# Patient Record
Sex: FEMALE | Race: BLACK OR AFRICAN AMERICAN | NOT HISPANIC OR LATINO | Employment: OTHER | URBAN - METROPOLITAN AREA
[De-identification: names, ages, dates, MRNs, and addresses within clinical notes are randomized per-mention and may not be internally consistent; named-entity substitution may affect disease eponyms.]

---

## 2018-01-29 ENCOUNTER — HOSPITAL ENCOUNTER (OUTPATIENT)
Dept: PREADMISSION TESTING | Facility: OTHER | Age: 53
Discharge: HOME | End: 2018-01-29
Attending: ORTHOPAEDIC SURGERY
Payer: MEDICAID

## 2018-01-29 ENCOUNTER — ANESTHESIA EVENT (OUTPATIENT)
Dept: SURGERY | Facility: OTHER | Age: 53
End: 2018-01-29

## 2018-01-29 VITALS
SYSTOLIC BLOOD PRESSURE: 96 MMHG | OXYGEN SATURATION: 99 % | WEIGHT: 173 LBS | HEIGHT: 68 IN | DIASTOLIC BLOOD PRESSURE: 65 MMHG | BODY MASS INDEX: 26.22 KG/M2 | HEART RATE: 81 BPM | TEMPERATURE: 98 F

## 2018-01-29 RX ORDER — LISINOPRIL AND HYDROCHLOROTHIAZIDE 12.5; 2 MG/1; MG/1
1 TABLET ORAL DAILY
COMMUNITY

## 2018-01-29 RX ORDER — FAMOTIDINE 20 MG/1
20 TABLET, FILM COATED ORAL
Status: CANCELLED | OUTPATIENT
Start: 2018-01-29 | End: 2018-01-29

## 2018-01-29 RX ORDER — CALCIUM CARBONATE 600 MG
600 TABLET ORAL ONCE
COMMUNITY

## 2018-01-29 RX ORDER — LIDOCAINE HYDROCHLORIDE 10 MG/ML
0.5 INJECTION, SOLUTION EPIDURAL; INFILTRATION; INTRACAUDAL; PERINEURAL ONCE
Status: CANCELLED | OUTPATIENT
Start: 2018-01-29 | End: 2018-01-29

## 2018-01-29 RX ORDER — ATORVASTATIN CALCIUM 20 MG/1
20 TABLET, FILM COATED ORAL DAILY
COMMUNITY

## 2018-01-29 RX ORDER — POTASSIUM CHLORIDE 750 MG/1
10 CAPSULE, EXTENDED RELEASE ORAL ONCE
COMMUNITY

## 2018-01-29 RX ORDER — METFORMIN HYDROCHLORIDE 500 MG/1
500 TABLET ORAL 2 TIMES DAILY WITH MEALS
COMMUNITY

## 2018-01-29 RX ORDER — AMLODIPINE BESYLATE 10 MG/1
10 TABLET ORAL DAILY
COMMUNITY

## 2018-01-29 RX ORDER — SODIUM CHLORIDE, SODIUM LACTATE, POTASSIUM CHLORIDE, CALCIUM CHLORIDE 600; 310; 30; 20 MG/100ML; MG/100ML; MG/100ML; MG/100ML
INJECTION, SOLUTION INTRAVENOUS CONTINUOUS
Status: CANCELLED | OUTPATIENT
Start: 2018-01-29

## 2018-01-29 RX ORDER — MIDAZOLAM HYDROCHLORIDE 5 MG/ML
4 INJECTION INTRAMUSCULAR; INTRAVENOUS ONCE AS NEEDED
Status: CANCELLED | OUTPATIENT
Start: 2018-01-29 | End: 2018-01-29

## 2018-01-29 RX ORDER — NAPROXEN SODIUM 220 MG/1
81 TABLET, FILM COATED ORAL DAILY
COMMUNITY

## 2018-01-29 NOTE — DISCHARGE INSTRUCTIONS
PRE-ADMIT TESTING -  [phone]    [address]  [facility]          Your surgery has been scheduled at [facility]. We are pleased to have the opportunity to serve you. For Further Information please call [phone].    On the day of surgery please report to the Information Desk on the 1st floor.    · CONTACT YOUR PHYSICIAN'S OFFICE THE DAY PRIOR TO YOUR SURGERY TO OBTAIN YOUR ARRIVAL TIME.     · The evening before surgery do not eat anything after 9 p.m. ( this includes hard candy, chewing gum and mints).  You may only have GATORADE, POWERADE AND WATER  from 9 p.m. until you leave your home.   DO NOT DRINK ANY LIQUIDS ON THE WAY TO THE HOSPITAL.      SPECIAL MEDICATION INSTRUCTIONS: TAKE medications checked off by the Anesthesiologist on your Medication List.    Angiogram Patients: Take medications as instructed by your physician, including aspirin.     Surgery Patients:    If you take ASPIRIN - Your PHYSICIAN/SURGEON will need to inform you IF/OR when you need to stop taking aspirin prior to your surgery.     Do Not take any medications containing IBUPROFEN.  Do Not Wear any make-up or dark nail polish   (especially eye make-up) to surgery. If you come to surgery with makeup on you will be required to remove the makeup or nail polish.    Do not shave your surgical area at least 5 days prior to your surgery. The surgical prep will be performed at the hospital according to Infection Control regulations.    Leave all valuables at home.   Do Not wear any jewelry or watches, including any metal in body piercings.  Contact Lens must be removed before surgery. Either do not wear the contact lens or bring a case and solution for storage.  Please bring a container for eyeglasses or dentures as required.  Bring any paperwork your physician has provided, such as consent forms,  history and physicals, doctor's orders, etc.   Bring comfortable clothes that are loose fitting to wear upon  discharge. Take into consideration the type of surgery being performed.  Maintain your diet as advised per your physician the day prior to surgery.      Adequate rest the night before surgery is advised.   Park in the Parking lot behind the hospital or in the [facility] across the street from the parking lot. Parking is complimentary.  If you will be discharged the same day as your procedure, please arrange for a responsible adult to drive you home or to accompany you if traveling by taxi.   YOU WILL NOT BE PERMITTED TO DRIVE OR TO LEAVE THE HOSPITAL ALONE AFTER SURGERY.   It is strongly recommended that you arrange for someone to remain with you for the first 24 hrs following your surgery.       Thank you for your cooperation.  The Staff of [facility].        Bathing Instructions                                                                 Please shower the evening before and morning of your procedure with    ANTIBACTERIAL SOAP. ( DIAL, etc )  Concentrate on the surgical area   for at least 3 minutes and rinse completely. Dry off as usual.   Do not use any deodorant, powder, body lotions, perfume, after shave or    cologne.

## 2018-01-29 NOTE — ANESTHESIA PREPROCEDURE EVALUATION
01/29/2018  Brooks Browning is a 52 y.o., female.    Pre-op Assessment    I have reviewed the Patient Summary Reports.     I have reviewed the Nursing Notes.   I have reviewed the Medications.     Review of Systems  Anesthesia Hx:  No problems with previous Anesthesia    Social:  Non-Smoker    Cardiovascular:   Hypertension, well controlled    Pulmonary:  Pulmonary Normal    Hepatic/GI:  Hepatic/GI Normal    Endocrine:   Diabetes, well controlled        Physical Exam  General:  Well nourished    Airway/Jaw/Neck:  Airway Findings: Mouth Opening: Normal Tongue: Normal  General Airway Assessment: Adult  Mallampati: II  TM Distance: Normal, at least 6 cm  Jaw/Neck Findings:     Neck ROM: Normal ROM      Dental:  Dental Findings: In tact             Anesthesia Plan  Type of Anesthesia, risks & benefits discussed:  Anesthesia Type:  general  Patient's Preference:   Intra-op Monitoring Plan:   Intra-op Monitoring Plan Comments:   Post Op Pain Control Plan: peripheral nerve block  Post Op Pain Control Plan Comments:   Induction:   IV  Beta Blocker:         Informed Consent: Patient understands risks and agrees with Anesthesia plan.  Questions answered. Anesthesia consent signed with patient.  ASA Score: 3     Day of Surgery Review of History & Physical:    H&P update referred to the surgeon.

## 2018-01-30 RX ORDER — CEFAZOLIN SODIUM 2 G/50ML
2 SOLUTION INTRAVENOUS
Status: DISCONTINUED | OUTPATIENT
Start: 2018-02-05 | End: 2018-01-30

## 2018-02-05 ENCOUNTER — ANESTHESIA (OUTPATIENT)
Dept: SURGERY | Facility: OTHER | Age: 53
End: 2018-02-05

## 2018-02-05 ENCOUNTER — HOSPITAL ENCOUNTER (INPATIENT)
Facility: OTHER | Age: 53
LOS: 1 days | Discharge: HOME | DRG: 554 | End: 2018-02-05
Attending: ORTHOPAEDIC SURGERY | Admitting: ORTHOPAEDIC SURGERY
Payer: MEDICAID

## 2018-02-05 VITALS
OXYGEN SATURATION: 98 % | RESPIRATION RATE: 18 BRPM | DIASTOLIC BLOOD PRESSURE: 77 MMHG | WEIGHT: 173 LBS | BODY MASS INDEX: 26.22 KG/M2 | TEMPERATURE: 99 F | HEART RATE: 72 BPM | HEIGHT: 68 IN | SYSTOLIC BLOOD PRESSURE: 117 MMHG

## 2018-02-05 DIAGNOSIS — M25.562 ACUTE PAIN OF LEFT KNEE: ICD-10-CM

## 2018-02-05 DIAGNOSIS — M17.12 PRIMARY OSTEOARTHRITIS OF LEFT KNEE: Primary | ICD-10-CM

## 2018-02-05 LAB — POCT GLUCOSE: 87 MG/DL (ref 70–110)

## 2018-02-05 PROCEDURE — 12000002 HC ACUTE/MED SURGE SEMI-PRIVATE ROOM

## 2018-02-05 PROCEDURE — 82962 GLUCOSE BLOOD TEST: CPT | Performed by: ORTHOPAEDIC SURGERY

## 2018-02-05 PROCEDURE — 25000003 PHARM REV CODE 250: Performed by: ANESTHESIOLOGY

## 2018-02-05 RX ORDER — SODIUM CHLORIDE, SODIUM LACTATE, POTASSIUM CHLORIDE, CALCIUM CHLORIDE 600; 310; 30; 20 MG/100ML; MG/100ML; MG/100ML; MG/100ML
INJECTION, SOLUTION INTRAVENOUS CONTINUOUS
Status: DISCONTINUED | OUTPATIENT
Start: 2018-02-05 | End: 2018-02-06 | Stop reason: HOSPADM

## 2018-02-05 RX ORDER — CEFAZOLIN SODIUM 2 G/50ML
2 SOLUTION INTRAVENOUS
Status: ACTIVE | OUTPATIENT
Start: 2018-02-05 | End: 2018-02-06

## 2018-02-05 RX ORDER — MIDAZOLAM HYDROCHLORIDE 5 MG/ML
4 INJECTION INTRAMUSCULAR; INTRAVENOUS ONCE AS NEEDED
Status: ACTIVE | OUTPATIENT
Start: 2018-02-05 | End: 2018-02-05

## 2018-02-05 RX ORDER — FAMOTIDINE 20 MG/1
20 TABLET, FILM COATED ORAL
Status: COMPLETED | OUTPATIENT
Start: 2018-02-05 | End: 2018-02-05

## 2018-02-05 RX ORDER — LIDOCAINE HYDROCHLORIDE 10 MG/ML
0.5 INJECTION, SOLUTION EPIDURAL; INFILTRATION; INTRACAUDAL; PERINEURAL ONCE
Status: DISCONTINUED | OUTPATIENT
Start: 2018-02-05 | End: 2018-02-06 | Stop reason: HOSPADM

## 2018-02-05 RX ADMIN — FAMOTIDINE 20 MG: 20 TABLET, FILM COATED ORAL at 01:02

## 2018-02-05 NOTE — OR NURSING
Notified Vanessa at Surgery desk that pt would like to speak with Dr. North; states she will let him know;

## 2018-02-05 NOTE — H&P
[facility] Orthopedic Surgery    HISTORY OF PRESENT ILLNESS:     48-year-old female with a history of long standing left   knee pain for many years. She denies and history of trauma or major surgical intervention to the knee. She has tried injections, NSAIDs, physical therapy, and brace without lasting relief. The pain is limiting to her activity level and mobility. She is frustrated with the pain and is requesting total knee arthroplasty.       PAST MEDICAL HISTORY   1.   Diabetes.   2.   Hypertension.   3.   Hyperlipidemia.      SOCIAL HISTORY:  She does not use tobacco.  She occasionally uses alcohol.      ALLERGIES:    Review of patient's allergies indicates:   Allergen Reactions    Morphine Rash          MEDICATIONS   1.   Metformin.   2.   Lisinopril.   3.   Amlodipine.   4.   Statin.   5.   Potassium chloride.   6.   Multivitamin.   7.   Calcium.   8.   Aspirin.   9.   Wellbutrin.      FAMILY HISTORY:  Her mother has a history of cancer and diabetes.      REVIEW OF SYSTEMS:  She also reports some occasional chills, some chest pain on   occasion, some numbness, and some musculoskeletal joint pains.      PHYSICAL EXAMINATION   VITAL SIGNS:  Height 5 feet 8 inches; weight 187 pounds.   AFVSS  GENERAL:  Well-appearing, well-nourished female.  Awake and oriented.  In no acute   distress.  Cooperative with exam.  Gross valgus deformity of the left knee on   overall inspection.   HEENT:  Atraumatic, normocephalic.   LUNGS:  No increased work of breathing.   SKIN:  No skin lesions.   MUSCULOSKELETAL:    Left lower extremity:   Range of motion from 0-95 degrees.    Fixed valgus deformity of the left knee  Significant lateral joint space tenderness to   palpation distally.    LTS intact   5/5 EHL, FHL, gastrocsoleus, tibialis anterior, and motor function  Has 2+ dorsalis pedis pulse     DIAGNOSTICS   1.   Two views of the left knee show significant valgus osteoarthritis with        complete joint space loss of the lateral  compartment.      ASSESSMENT:  Left knee arthritis.      PLAN   To OR today for left total knee arthroplasty

## 2018-02-22 NOTE — DISCHARGE SUMMARY
Patient was discharged prior to orthopedic surgery due to multiple factors including patient preference after discussion and OR availability.     Will contact patient for re-scheduling.

## 2018-02-26 ENCOUNTER — HOSPITAL ENCOUNTER (INPATIENT)
Facility: OTHER | Age: 53
LOS: 1 days | Discharge: HOME | DRG: 470 | End: 2018-02-27
Attending: ORTHOPAEDIC SURGERY | Admitting: ORTHOPAEDIC SURGERY
Payer: MEDICAID

## 2018-02-26 ENCOUNTER — ANESTHESIA EVENT (OUTPATIENT)
Dept: SURGERY | Facility: OTHER | Age: 53
DRG: 470 | End: 2018-02-26
Payer: MEDICAID

## 2018-02-26 ENCOUNTER — ANESTHESIA (OUTPATIENT)
Dept: SURGERY | Facility: OTHER | Age: 53
DRG: 470 | End: 2018-02-26
Payer: MEDICAID

## 2018-02-26 DIAGNOSIS — M17.12 PRIMARY OSTEOARTHRITIS OF LEFT KNEE: Primary | ICD-10-CM

## 2018-02-26 DIAGNOSIS — M25.562 PAIN IN JOINT OF LEFT KNEE: ICD-10-CM

## 2018-02-26 LAB
ANION GAP SERPL CALC-SCNC: 12 MMOL/L
BUN SERPL-MCNC: 14 MG/DL
CALCIUM SERPL-MCNC: 9 MG/DL
CHLORIDE SERPL-SCNC: 106 MMOL/L
CO2 SERPL-SCNC: 19 MMOL/L
CREAT SERPL-MCNC: 0.7 MG/DL
EST. GFR  (AFRICAN AMERICAN): >60 ML/MIN/1.73 M^2
EST. GFR  (NON AFRICAN AMERICAN): >60 ML/MIN/1.73 M^2
GLUCOSE SERPL-MCNC: 147 MG/DL
HCT VFR BLD AUTO: 41.5 %
HGB BLD-MCNC: 14.1 G/DL
POCT GLUCOSE: 128 MG/DL (ref 70–110)
POCT GLUCOSE: 226 MG/DL (ref 70–110)
POCT GLUCOSE: 242 MG/DL (ref 70–110)
POTASSIUM SERPL-SCNC: 4.2 MMOL/L
SODIUM SERPL-SCNC: 137 MMOL/L

## 2018-02-26 PROCEDURE — 80048 BASIC METABOLIC PNL TOTAL CA: CPT

## 2018-02-26 PROCEDURE — C9290 INJ, BUPIVACAINE LIPOSOME: HCPCS | Performed by: ORTHOPAEDIC SURGERY

## 2018-02-26 PROCEDURE — 63600175 PHARM REV CODE 636 W HCPCS: Performed by: NURSE ANESTHETIST, CERTIFIED REGISTERED

## 2018-02-26 PROCEDURE — C1776 JOINT DEVICE (IMPLANTABLE): HCPCS | Performed by: ORTHOPAEDIC SURGERY

## 2018-02-26 PROCEDURE — 11000001 HC ACUTE MED/SURG PRIVATE ROOM

## 2018-02-26 PROCEDURE — 71000033 HC RECOVERY, INTIAL HOUR: Performed by: ORTHOPAEDIC SURGERY

## 2018-02-26 PROCEDURE — 85014 HEMATOCRIT: CPT

## 2018-02-26 PROCEDURE — 97110 THERAPEUTIC EXERCISES: CPT

## 2018-02-26 PROCEDURE — 85018 HEMOGLOBIN: CPT

## 2018-02-26 PROCEDURE — 27201423 OPTIME MED/SURG SUP & DEVICES STERILE SUPPLY: Performed by: ORTHOPAEDIC SURGERY

## 2018-02-26 PROCEDURE — 82962 GLUCOSE BLOOD TEST: CPT | Performed by: ORTHOPAEDIC SURGERY

## 2018-02-26 PROCEDURE — 36000710: Performed by: ORTHOPAEDIC SURGERY

## 2018-02-26 PROCEDURE — 63600175 PHARM REV CODE 636 W HCPCS: Performed by: ORTHOPAEDIC SURGERY

## 2018-02-26 PROCEDURE — 25000003 PHARM REV CODE 250: Performed by: NURSE ANESTHETIST, CERTIFIED REGISTERED

## 2018-02-26 PROCEDURE — 25000003 PHARM REV CODE 250: Performed by: ORTHOPAEDIC SURGERY

## 2018-02-26 PROCEDURE — 36000711: Performed by: ORTHOPAEDIC SURGERY

## 2018-02-26 PROCEDURE — 97161 PT EVAL LOW COMPLEX 20 MIN: CPT

## 2018-02-26 PROCEDURE — 37000008 HC ANESTHESIA 1ST 15 MINUTES: Performed by: ORTHOPAEDIC SURGERY

## 2018-02-26 PROCEDURE — 37000009 HC ANESTHESIA EA ADD 15 MINS: Performed by: ORTHOPAEDIC SURGERY

## 2018-02-26 PROCEDURE — C1713 ANCHOR/SCREW BN/BN,TIS/BN: HCPCS | Performed by: ORTHOPAEDIC SURGERY

## 2018-02-26 PROCEDURE — 63600175 PHARM REV CODE 636 W HCPCS: Performed by: ANESTHESIOLOGY

## 2018-02-26 PROCEDURE — 36415 COLL VENOUS BLD VENIPUNCTURE: CPT

## 2018-02-26 PROCEDURE — 71000039 HC RECOVERY, EACH ADD'L HOUR: Performed by: ORTHOPAEDIC SURGERY

## 2018-02-26 PROCEDURE — 97116 GAIT TRAINING THERAPY: CPT

## 2018-02-26 DEVICE — IMPLANTABLE DEVICE: Type: IMPLANTABLE DEVICE | Site: KNEE | Status: FUNCTIONAL

## 2018-02-26 DEVICE — CEMENT BONE ORTHOSET III: Type: IMPLANTABLE DEVICE | Site: KNEE | Status: FUNCTIONAL

## 2018-02-26 DEVICE — PATELLA ONLAY 32MM TRI PEG: Type: IMPLANTABLE DEVICE | Site: KNEE | Status: FUNCTIONAL

## 2018-02-26 RX ORDER — SODIUM CHLORIDE, SODIUM LACTATE, POTASSIUM CHLORIDE, CALCIUM CHLORIDE 600; 310; 30; 20 MG/100ML; MG/100ML; MG/100ML; MG/100ML
INJECTION, SOLUTION INTRAVENOUS CONTINUOUS PRN
Status: DISCONTINUED | OUTPATIENT
Start: 2018-02-26 | End: 2018-02-26

## 2018-02-26 RX ORDER — IBUPROFEN 200 MG
24 TABLET ORAL
Status: DISCONTINUED | OUTPATIENT
Start: 2018-02-26 | End: 2018-02-27 | Stop reason: HOSPADM

## 2018-02-26 RX ORDER — ONDANSETRON 2 MG/ML
4 INJECTION INTRAMUSCULAR; INTRAVENOUS DAILY PRN
Status: DISCONTINUED | OUTPATIENT
Start: 2018-02-26 | End: 2018-02-26 | Stop reason: HOSPADM

## 2018-02-26 RX ORDER — LIDOCAINE HCL/PF 100 MG/5ML
SYRINGE (ML) INTRAVENOUS
Status: DISCONTINUED | OUTPATIENT
Start: 2018-02-26 | End: 2018-02-26

## 2018-02-26 RX ORDER — GLYCOPYRROLATE 0.2 MG/ML
INJECTION INTRAMUSCULAR; INTRAVENOUS
Status: DISCONTINUED | OUTPATIENT
Start: 2018-02-26 | End: 2018-02-26

## 2018-02-26 RX ORDER — HYDROCODONE BITARTRATE AND ACETAMINOPHEN 5; 325 MG/1; MG/1
2 TABLET ORAL EVERY 4 HOURS PRN
Status: DISCONTINUED | OUTPATIENT
Start: 2018-02-26 | End: 2018-02-27 | Stop reason: HOSPADM

## 2018-02-26 RX ORDER — NEOSTIGMINE METHYLSULFATE 1 MG/ML
INJECTION, SOLUTION INTRAVENOUS
Status: DISCONTINUED | OUTPATIENT
Start: 2018-02-26 | End: 2018-02-26

## 2018-02-26 RX ORDER — SODIUM CHLORIDE 0.9 % (FLUSH) 0.9 %
5 SYRINGE (ML) INJECTION
Status: DISCONTINUED | OUTPATIENT
Start: 2018-02-26 | End: 2018-02-27 | Stop reason: HOSPADM

## 2018-02-26 RX ORDER — ROCURONIUM BROMIDE 10 MG/ML
INJECTION, SOLUTION INTRAVENOUS
Status: DISCONTINUED | OUTPATIENT
Start: 2018-02-26 | End: 2018-02-26

## 2018-02-26 RX ORDER — MIDAZOLAM HYDROCHLORIDE 1 MG/ML
INJECTION INTRAMUSCULAR; INTRAVENOUS
Status: DISCONTINUED | OUTPATIENT
Start: 2018-02-26 | End: 2018-02-26

## 2018-02-26 RX ORDER — FENTANYL CITRATE 50 UG/ML
INJECTION, SOLUTION INTRAMUSCULAR; INTRAVENOUS
Status: DISCONTINUED | OUTPATIENT
Start: 2018-02-26 | End: 2018-02-26

## 2018-02-26 RX ORDER — ACETAMINOPHEN 10 MG/ML
1000 INJECTION, SOLUTION INTRAVENOUS ONCE
Status: COMPLETED | OUTPATIENT
Start: 2018-02-26 | End: 2018-02-26

## 2018-02-26 RX ORDER — MEPERIDINE HYDROCHLORIDE 50 MG/ML
12.5 INJECTION INTRAMUSCULAR; INTRAVENOUS; SUBCUTANEOUS ONCE AS NEEDED
Status: COMPLETED | OUTPATIENT
Start: 2018-02-26 | End: 2018-02-26

## 2018-02-26 RX ORDER — MUPIROCIN 20 MG/G
1 OINTMENT TOPICAL 2 TIMES DAILY
Status: DISCONTINUED | OUTPATIENT
Start: 2018-02-26 | End: 2018-02-27 | Stop reason: HOSPADM

## 2018-02-26 RX ORDER — ONDANSETRON 2 MG/ML
INJECTION INTRAMUSCULAR; INTRAVENOUS
Status: DISCONTINUED | OUTPATIENT
Start: 2018-02-26 | End: 2018-02-26

## 2018-02-26 RX ORDER — CELECOXIB 200 MG/1
200 CAPSULE ORAL 2 TIMES DAILY
Status: DISCONTINUED | OUTPATIENT
Start: 2018-02-26 | End: 2018-02-27 | Stop reason: HOSPADM

## 2018-02-26 RX ORDER — GLUCAGON 1 MG
1 KIT INJECTION
Status: DISCONTINUED | OUTPATIENT
Start: 2018-02-26 | End: 2018-02-27 | Stop reason: HOSPADM

## 2018-02-26 RX ORDER — OXYCODONE HYDROCHLORIDE 5 MG/1
10 TABLET ORAL EVERY 4 HOURS PRN
Status: DISCONTINUED | OUTPATIENT
Start: 2018-02-26 | End: 2018-02-27 | Stop reason: HOSPADM

## 2018-02-26 RX ORDER — FAMOTIDINE 20 MG/1
20 TABLET, FILM COATED ORAL 2 TIMES DAILY
Status: DISCONTINUED | OUTPATIENT
Start: 2018-02-26 | End: 2018-02-27 | Stop reason: HOSPADM

## 2018-02-26 RX ORDER — DOCUSATE SODIUM 100 MG/1
100 CAPSULE, LIQUID FILLED ORAL EVERY 12 HOURS
Status: DISCONTINUED | OUTPATIENT
Start: 2018-02-26 | End: 2018-02-27 | Stop reason: HOSPADM

## 2018-02-26 RX ORDER — PROPOFOL 10 MG/ML
VIAL (ML) INTRAVENOUS
Status: DISCONTINUED | OUTPATIENT
Start: 2018-02-26 | End: 2018-02-26

## 2018-02-26 RX ORDER — IBUPROFEN 200 MG
16 TABLET ORAL
Status: DISCONTINUED | OUTPATIENT
Start: 2018-02-26 | End: 2018-02-27 | Stop reason: HOSPADM

## 2018-02-26 RX ORDER — MIDAZOLAM HYDROCHLORIDE 1 MG/ML
2 INJECTION INTRAMUSCULAR; INTRAVENOUS ONCE
Status: DISCONTINUED | OUTPATIENT
Start: 2018-02-26 | End: 2018-02-27 | Stop reason: HOSPADM

## 2018-02-26 RX ORDER — HYDROMORPHONE HYDROCHLORIDE 2 MG/ML
0.4 INJECTION, SOLUTION INTRAMUSCULAR; INTRAVENOUS; SUBCUTANEOUS EVERY 5 MIN PRN
Status: DISCONTINUED | OUTPATIENT
Start: 2018-02-26 | End: 2018-02-26 | Stop reason: HOSPADM

## 2018-02-26 RX ORDER — SODIUM CHLORIDE 0.9 % (FLUSH) 0.9 %
3 SYRINGE (ML) INJECTION
Status: DISCONTINUED | OUTPATIENT
Start: 2018-02-26 | End: 2018-02-26

## 2018-02-26 RX ORDER — ENOXAPARIN SODIUM 100 MG/ML
30 INJECTION SUBCUTANEOUS
Status: DISCONTINUED | OUTPATIENT
Start: 2018-02-27 | End: 2018-02-27 | Stop reason: HOSPADM

## 2018-02-26 RX ORDER — OXYCODONE HYDROCHLORIDE 5 MG/1
5 TABLET ORAL
Status: DISCONTINUED | OUTPATIENT
Start: 2018-02-26 | End: 2018-02-26 | Stop reason: HOSPADM

## 2018-02-26 RX ORDER — FENTANYL CITRATE 50 UG/ML
100 INJECTION, SOLUTION INTRAMUSCULAR; INTRAVENOUS EVERY 5 MIN PRN
Status: DISCONTINUED | OUTPATIENT
Start: 2018-02-26 | End: 2018-02-27 | Stop reason: HOSPADM

## 2018-02-26 RX ORDER — CEFAZOLIN SODIUM 2 G/50ML
2 SOLUTION INTRAVENOUS
Status: COMPLETED | OUTPATIENT
Start: 2018-02-26 | End: 2018-02-27

## 2018-02-26 RX ORDER — FENTANYL CITRATE 50 UG/ML
25 INJECTION, SOLUTION INTRAMUSCULAR; INTRAVENOUS EVERY 5 MIN PRN
Status: DISCONTINUED | OUTPATIENT
Start: 2018-02-26 | End: 2018-02-26 | Stop reason: HOSPADM

## 2018-02-26 RX ORDER — CEFAZOLIN SODIUM 2 G/50ML
2 SOLUTION INTRAVENOUS
Status: COMPLETED | OUTPATIENT
Start: 2018-02-26 | End: 2018-02-26

## 2018-02-26 RX ORDER — ONDANSETRON 8 MG/1
8 TABLET, ORALLY DISINTEGRATING ORAL EVERY 8 HOURS PRN
Status: DISCONTINUED | OUTPATIENT
Start: 2018-02-26 | End: 2018-02-27 | Stop reason: HOSPADM

## 2018-02-26 RX ORDER — INSULIN ASPART 100 [IU]/ML
0-5 INJECTION, SOLUTION INTRAVENOUS; SUBCUTANEOUS
Status: DISCONTINUED | OUTPATIENT
Start: 2018-02-26 | End: 2018-02-27 | Stop reason: HOSPADM

## 2018-02-26 RX ADMIN — DOCUSATE SODIUM 100 MG: 100 CAPSULE, LIQUID FILLED ORAL at 03:02

## 2018-02-26 RX ADMIN — INSULIN ASPART 1 UNITS: 100 INJECTION, SOLUTION INTRAVENOUS; SUBCUTANEOUS at 11:02

## 2018-02-26 RX ADMIN — MIDAZOLAM HYDROCHLORIDE 2 MG: 1 INJECTION, SOLUTION INTRAMUSCULAR; INTRAVENOUS at 06:02

## 2018-02-26 RX ADMIN — HYDROMORPHONE HYDROCHLORIDE 0.4 MG: 2 INJECTION, SOLUTION INTRAMUSCULAR; INTRAVENOUS; SUBCUTANEOUS at 11:02

## 2018-02-26 RX ADMIN — LIDOCAINE HYDROCHLORIDE 75 MG: 20 INJECTION, SOLUTION INTRAVENOUS at 07:02

## 2018-02-26 RX ADMIN — GLYCOPYRROLATE 0.8 MG: 0.2 INJECTION, SOLUTION INTRAMUSCULAR; INTRAVENOUS at 09:02

## 2018-02-26 RX ADMIN — ROCURONIUM BROMIDE 40 MG: 10 INJECTION, SOLUTION INTRAVENOUS at 07:02

## 2018-02-26 RX ADMIN — SODIUM CHLORIDE, SODIUM LACTATE, POTASSIUM CHLORIDE, AND CALCIUM CHLORIDE: 600; 310; 30; 20 INJECTION, SOLUTION INTRAVENOUS at 06:02

## 2018-02-26 RX ADMIN — CEFAZOLIN SODIUM 2 G: 2 SOLUTION INTRAVENOUS at 03:02

## 2018-02-26 RX ADMIN — MUPIROCIN 1 G: 20 OINTMENT TOPICAL at 08:02

## 2018-02-26 RX ADMIN — OXYCODONE HYDROCHLORIDE 10 MG: 5 TABLET ORAL at 06:02

## 2018-02-26 RX ADMIN — HYDROCODONE BITARTRATE AND ACETAMINOPHEN 2 TABLET: 5; 325 TABLET ORAL at 08:02

## 2018-02-26 RX ADMIN — FENTANYL CITRATE 100 MCG: 50 INJECTION, SOLUTION INTRAMUSCULAR; INTRAVENOUS at 06:02

## 2018-02-26 RX ADMIN — CEFAZOLIN SODIUM 2 G: 2 SOLUTION INTRAVENOUS at 07:02

## 2018-02-26 RX ADMIN — FENTANYL CITRATE 50 MCG: 50 INJECTION, SOLUTION INTRAMUSCULAR; INTRAVENOUS at 10:02

## 2018-02-26 RX ADMIN — INSULIN ASPART 2 UNITS: 100 INJECTION, SOLUTION INTRAVENOUS; SUBCUTANEOUS at 04:02

## 2018-02-26 RX ADMIN — ONDANSETRON 4 MG: 2 INJECTION INTRAMUSCULAR; INTRAVENOUS at 09:02

## 2018-02-26 RX ADMIN — ACETAMINOPHEN 1000 MG: 10 INJECTION, SOLUTION INTRAVENOUS at 12:02

## 2018-02-26 RX ADMIN — DOCUSATE SODIUM 100 MG: 100 CAPSULE, LIQUID FILLED ORAL at 08:02

## 2018-02-26 RX ADMIN — OXYCODONE HYDROCHLORIDE 10 MG: 5 TABLET ORAL at 02:02

## 2018-02-26 RX ADMIN — CELECOXIB 200 MG: 200 CAPSULE ORAL at 08:02

## 2018-02-26 RX ADMIN — MUPIROCIN 1 G: 20 OINTMENT TOPICAL at 03:02

## 2018-02-26 RX ADMIN — FAMOTIDINE 20 MG: 20 TABLET, FILM COATED ORAL at 03:02

## 2018-02-26 RX ADMIN — CEFAZOLIN SODIUM 2 G: 2 SOLUTION INTRAVENOUS at 11:02

## 2018-02-26 RX ADMIN — CARBOXYMETHYLCELLULOSE SODIUM 2 DROP: 2.5 SOLUTION/ DROPS OPHTHALMIC at 07:02

## 2018-02-26 RX ADMIN — FAMOTIDINE 20 MG: 20 TABLET, FILM COATED ORAL at 08:02

## 2018-02-26 RX ADMIN — FENTANYL CITRATE 100 MCG: 50 INJECTION, SOLUTION INTRAMUSCULAR; INTRAVENOUS at 07:02

## 2018-02-26 RX ADMIN — MEPERIDINE HYDROCHLORIDE 12.5 MG: 50 INJECTION INTRAMUSCULAR; INTRAVENOUS; SUBCUTANEOUS at 10:02

## 2018-02-26 RX ADMIN — NEOSTIGMINE METHYLSULFATE 5 MG: 1 INJECTION INTRAVENOUS at 09:02

## 2018-02-26 RX ADMIN — PROPOFOL 200 MG: 10 INJECTION, EMULSION INTRAVENOUS at 07:02

## 2018-02-26 NOTE — CARE UPDATE
Report received from Kasia TURNER from PACU. Pt transferred to room 370. Pt AAO x 4. Resp. Even and unlabored. Peripheral iv patent. Denies any c/o discomfort. Dsg to Lt knee CDI. Oriented to room. SAfety maintained. Call light in reach. VSS.

## 2018-02-26 NOTE — ANESTHESIA PREPROCEDURE EVALUATION
02/26/2018  Brooks Browning is a 52 y.o., female.    Anesthesia Evaluation    I have reviewed the Patient Summary Reports.    I have reviewed the Nursing Notes.   I have reviewed the Medications.     Review of Systems  Anesthesia Hx:  No problems with previous Anesthesia    Social:  Non-Smoker    Cardiovascular:   Hypertension, well controlled    Pulmonary:  Pulmonary Normal    Hepatic/GI:  Hepatic/GI Normal    Endocrine:   Diabetes, well controlled        Physical Exam  General:  Well nourished    Airway/Jaw/Neck:  Airway Findings: Mouth Opening: Normal Tongue: Normal  General Airway Assessment: Adult  Mallampati: II  TM Distance: Normal, at least 6 cm  Jaw/Neck Findings:     Neck ROM: Normal ROM      Dental:  Dental Findings: In tact             Anesthesia Plan  Type of Anesthesia, risks & benefits discussed:  Anesthesia Type:  general  Patient's Preference:   Intra-op Monitoring Plan:   Intra-op Monitoring Plan Comments:   Post Op Pain Control Plan: peripheral nerve block  Post Op Pain Control Plan Comments:   Induction:   IV  Beta Blocker:         Informed Consent: Patient understands risks and agrees with Anesthesia plan.  Questions answered. Anesthesia consent signed with patient.  ASA Score: 3     Day of Surgery Review of History & Physical:    H&P update referred to the surgeon.     Anesthesia Plan Notes: Case cxd 1/19 secondary to equipment issues.        Ready For Surgery From Anesthesia Perspective.

## 2018-02-26 NOTE — H&P
HISTORY OF PRESENT ILLNESS:      48-year-old female with a history of long standing left   knee pain for many years. She denies and history of trauma or major surgical intervention to the knee. She has tried injections, NSAIDs, physical therapy, and brace without lasting relief. The pain is limiting to her activity level and mobility. She is frustrated with the pain and is requesting total knee arthroplasty.         PAST MEDICAL HISTORY   1.   Diabetes.   2.   Hypertension.   3.   Hyperlipidemia.      SOCIAL HISTORY:  She does not use tobacco.  She occasionally uses alcohol.      ALLERGIES:         Review of patient's allergies indicates:   Allergen Reactions    Morphine Rash            MEDICATIONS   1.   Metformin.   2.   Lisinopril.   3.   Amlodipine.   4.   Statin.   5.   Potassium chloride.   6.   Multivitamin.   7.   Calcium.   8.   Aspirin.   9.   Wellbutrin.      FAMILY HISTORY:  Her mother has a history of cancer and diabetes.      REVIEW OF SYSTEMS:  She also reports some occasional chills, some chest pain on   occasion, some numbness, and some musculoskeletal joint pains.      PHYSICAL EXAMINATION   VITAL SIGNS:  Height 5 feet 8 inches; weight 187 pounds.   AFVSS  GENERAL:  Well-appearing, well-nourished female.  Awake and oriented.  In no acute   distress.  Cooperative with exam.  Gross valgus deformity of the left knee on   overall inspection.   HEENT:  Atraumatic, normocephalic.   LUNGS:  No increased work of breathing.   SKIN:  No skin lesions.   MUSCULOSKELETAL:    Left lower extremity:   Range of motion from 0-95 degrees.    Fixed valgus deformity of the left knee  Significant lateral joint space tenderness to   palpation distally.    LTS intact   5/5 EHL, FHL, gastrocsoleus, tibialis anterior, and motor function  Has 2+ dorsalis pedis pulse     DIAGNOSTICS   1.   Two views of the left knee show significant valgus osteoarthritis with        complete joint space loss of the lateral compartment.       ASSESSMENT:  Left knee arthritis.      PLAN   To OR today for left total knee arthroplasty

## 2018-02-26 NOTE — INTERVAL H&P NOTE
The patient has been examined and the H&P has been reviewed:    I concur with the findings and no changes have occurred since H&P was written.    Anesthesia/Surgery risks, benefits and alternative options discussed and understood by patient/family.          Active Hospital Problems    Diagnosis  POA    *Degenerative arthritis of left knee [M17.12]  Yes      Resolved Hospital Problems    Diagnosis Date Resolved POA   No resolved problems to display.

## 2018-02-26 NOTE — ANESTHESIA PROCEDURE NOTES
Peripheral    Patient location during procedure: pre-op   Block not for primary anesthetic.  Reason for block: at surgeon's request and post-op pain management   Post-op Pain Location: left knee pain   Staffing  Anesthesiologist: JESSICA STOKES  Performed: anesthesiologist   Preanesthetic Checklist  Completed: patient identified, site marked, surgical consent, pre-op evaluation, timeout performed, IV checked, risks and benefits discussed and monitors and equipment checked  Peripheral Block  Patient position: supine  Prep: ChloraPrep  Patient monitoring: heart rate, cardiac monitor, continuous pulse ox, continuous capnometry and frequent blood pressure checks  Block type: adductor canal  Laterality: left  Injection technique: single shot  Needle  Needle type: Stimuplex   Needle gauge: 21 G  Needle length: 3.5 in  Needle localization: anatomical landmarks and ultrasound guidance   -ultrasound image captured on disc.  Assessment  Injection assessment: negative aspiration, negative parasthesia and local visualized surrounding nerve  Paresthesia pain: none  Heart rate change: no  Slow fractionated injection: yes  Medications:  Bolus administered: 30 mL of 0.5 ropivacaine  Additional Notes  VSS.  [facility] RN monitoring vitals throughout procedure.  Patient tolerated procedure well.

## 2018-02-26 NOTE — PT/OT/SLP PROGRESS
Physical Therapy Treatment    Patient Name:  Brooks Browning   MRN:  [id number]    Recommendations:     Discharge Recommendations:  home, outpatient PT   Discharge Equipment Recommendations: bath bench, walker, rolling   Barriers to discharge: None    Assessment:     Brooks Browning is a 52 y.o. female admitted with a medical diagnosis of <principal problem not specified>.  She presents with the following impairments/functional limitations:  weakness, impaired endurance, impaired balance, gait instability, decreased lower extremity function, impaired functional mobilty.  Good progress in therapy.     Rehab Prognosis:  excellent; patient would benefit from acute skilled PT services to address these deficits and reach maximum level of function.      Recent Surgery: Procedure(s) (LRB):  REPLACEMENT-KNEE-TOTAL (Left) Day of Surgery    Plan:     During this hospitalization, patient to be seen BID to address the above listed problems via gait training, therapeutic activities, therapeutic exercises  · Plan of Care Expires:  03/26/18   Plan of Care Reviewed with: patient    Subjective     Communicated with patient prior to session.  Patient found in bed supine upon PT entry to room, agreeable to treatment.      Chief Complaint: pain behind knee  Patient comments/goals: to sleep  Pain/Comfort:  · Pain Rating 1: 3/10  · Location - Side 1: Left  · Location 1: knee  · Pain Addressed 1: Pre-medicate for activity, Distraction  · Pain Rating Post-Intervention 1: 3/10    Patients cultural, spiritual, Caodaism conflicts given the current situation: none    Objective:     Patient found with: peripheral IV, riley catheter, SCD     General Precautions: Standard, fall   Orthopedic Precautions:LLE weight bearing as tolerated   Braces: N/A     Functional Mobility:  · Bed Mobility:     · Supine to Sit: supervision  · Sit to Supine: supervision  · Transfers:     · Sit to Stand:  stand by assistance with rolling walker  · Gait: amb 120' with  RW and supervision  · Balance: good standing dynamic balance      AM-PAC 6 CLICK MOBILITY  Turning over in bed (including adjusting bedclothes, sheets and blankets)?: 3  Sitting down on and standing up from a chair with arms (e.g., wheelchair, bedside commode, etc.): 3  Moving from lying on back to sitting on the side of the bed?: 3  Moving to and from a bed to a chair (including a wheelchair)?: 3  Need to walk in hospital room?: 3  Climbing 3-5 steps with a railing?: 3  Total Score: 18       Therapeutic Activities and Exercises:   In bed supine: AP x20; slr with assist x 15; QS x10        Patient left supine with all lines intact call button in reach, bed alarm on, nurse notified.  GOALS:    Physical Therapy Goals        Problem: Physical Therapy Goal    Goal Priority Disciplines Outcome Goal Variances Interventions   Physical Therapy Goal     PT/OT, PT      Description:  Goals to be met by 2-26-18.  1. Sup<>sit mod I  2. Sit<>stand with RW mod I  3. amb 150' with RW mod I  4. Up/down 3 steps B rail mod I                      Time Tracking:     PT Received On: 02/26/18  PT Start Time: 1608     PT Stop Time: 1631  PT Total Time (min): 23 min     Billable Minutes: Gait Training 15 and Therapeutic Exercise 8    Treatment Type: Treatment  PT/PTA: PT           Keanu Leo, PT  02/26/2018

## 2018-02-26 NOTE — PLAN OF CARE
Problem: Physical Therapy Goal  Goal: Physical Therapy Goal  Goals to be met by 2-26-18.  1. Sup<>sit mod I  2. Sit<>stand with RW mod I  3. amb 150' with RW mod I  4. Up/down 3 steps B rail mod I    -    Comments: Physical therapy eval completed.  Recommend home with OP PT.  Will need RW, TTB for home use.

## 2018-02-26 NOTE — PT/OT/SLP EVAL
Physical Therapy Evaluation    Patient Name:  Brooks Browning   MRN:  [id number]    Recommendations:     Discharge Recommendations:  home, outpatient OT   Discharge Equipment Recommendations: bath bench, walker, rolling   Barriers to discharge: None    Assessment:     Brooks Browning is a 52 y.o. female admitted with a medical diagnosis of <principal problem not specified>. S/p TKR L 2-26-18     She presents with the following impairments/functional limitations:  weakness, pain, gait instability, impaired functional mobilty, impaired balance, decreased lower extremity function.      Rehab Prognosis:  excellent; patient would benefit from acute skilled PT services to address these deficits and reach maximum level of function.      Recent Surgery: Procedure(s) (LRB):  REPLACEMENT-KNEE-TOTAL (Left) Day of Surgery    Plan:     During this hospitalization, patient to be seen BID to address the above listed problems via gait training, therapeutic activities, therapeutic exercises  · Plan of Care Expires:  03/26/18   Plan of Care Reviewed with: patient, daughter    Subjective     Communicated with patient prior to session.  Patient found in bed supine upon PT entry to room, agreeable to evaluation.      Chief Complaint: L knee pain  Patient comments/goals: to go home  Pain/Comfort:  · Pain Rating 1: 2/10  · Location - Side 1: Left  · Location 1: knee  · Pain Addressed 1: Pre-medicate for activity, Distraction  · Pain Rating Post-Intervention 1: 2/10    Patients cultural, spiritual, Religion conflicts given the current situation: none    Living Environment:  Lives with sons in  with 5 BRANDIE and B rails. Shower tub combo.  Prior to admission, patients level of function was amb I without AD.  Patient has the following equipment: none, bedside commode.  DME owned (not currently used): none.  Upon discharge, patient will have assistance from family.    Objective:     Patient found with: peripheral IV, SCD, riley catheter      General Precautions: Standard, fall   Orthopedic Precautions:LLE weight bearing as tolerated   Braces: N/A     Exams:  · Cognitive Exam:  Patient is oriented to Person, Place, Time and Situation and follows 100% of verbal commands   · Fine Motor Coordination:    · -       Impaired  LLE heel shin -  · Gross Motor Coordination:  WFL  · Postural Exam:  Patient presented with the following abnormalities:    · -       No postural abnormalities identified  · Sensation:    · -       Intact  · Skin Integrity/Edema:      · -       Skin integrity: Visible skin intact  · -       Edema: Mild L LE  · RLE ROM: WFL  · RLE Strength: WFL  · LLE ROM: WFL except limited by pain  · LLE Strength: WFL except limited by pain    Functional Mobility:  · Bed Mobility:     · Supine to Sit: supervision  · Transfers:     · Sit to Stand:  contact guard assistance with rolling walker  · Gait: amb 60' with RW and CGA vc for use of walker and gait  · Balance: good standing dynamic balance      AM-PAC 6 CLICK MOBILITY  Total Score:18       Patient left up in chair with all lines intact, call button in reach, nurse notified and daughter present.    GOALS:    Physical Therapy Goals        Problem: Physical Therapy Goal    Goal Priority Disciplines Outcome Goal Variances Interventions   Physical Therapy Goal     PT/OT, PT      Description:  Goals to be met by 18.  1. Sup<>sit mod I  2. Sit<>stand with RW mod I  3. amb 150' with RW mod I  4. Up/down 3 steps B rail mod I                      History:     Past Medical History:   Diagnosis Date    Arthritis     Diabetes mellitus     Elevated cholesterol     Hypertension        Past Surgical History:   Procedure Laterality Date     SECTION      HIP SURGERY Right     HYSTERECTOMY      JOINT REPLACEMENT Bilateral     hip       Clinical Decision Making:     History  Co-morbidities and personal factors that may impact the plan of care Examination  Body Structures and Functions, activity  limitations and participation restrictions that may impact the plan of care Clinical Presentation   Decision Making/ Complexity Score   Co-morbidities:   [] Time since onset of injury / illness / exacerbation  [] Status of current condition  []Patient's cognitive status and safety concerns    [] Multiple Medical Problems (see med hx)  Personal Factors:   [] Patient's age  [] Prior Level of function   [] Patient's home situation (environment and family support)  [] Patient's level of motivation  [] Expected progression of patient      HISTORY:(criteria)    [] 25865 - no personal factors/history    [] 29451 - has 1-2 personal factor/comorbidity     [] 79311 - has >3 personal factor/comorbidity     Body Regions:  [] Objective examination findings  [] Head     []  Neck  [] Trunk   [] Upper Extremity  [] Lower Extremity    Body Systems:  [] For communication ability, affect, cognition, language, and learning style: the assessment of the ability to make needs known, consciousness, orientation (person, place, and time), expected emotional /behavioral responses, and learning preferences (eg, learning barriers, education  needs)  [] For the neuromuscular system: a general assessment of gross coordinated movement (eg, balance, gait, locomotion, transfers, and transitions) and motor function  (motor control and motor learning)  [] For the musculoskeletal system: the assessment of gross symmetry, gross range of motion, gross strength, height, and weight  [] For the integumentary system: the assessment of pliability(texture), presence of scar formation, skin color, and skin integrity  [] For cardiovascular/pulmonary system: the assessment of heart rate, respiratory rate, blood pressure, and edema     Activity limitations:    [] Patient's cognitive status and saf ety concerns          [] Status of current condition      [] Weight bearing restriction  [] Cardiopulmunary Restriction    Participation Restrictions:   [] Goals and  goal agreement with the patient     [] Rehab potential (prognosis) and probable outcome      Examination of Body System: (criteria)    [] 81778 - addressing 1-2 elements    [] 34178 - addressing a total of 3 or more elements     [] 03021 -  Addressing a total of 4 or more elements         Clinical Presentation: (criteria)  Choose one     On examination of body system using standardized tests and measures patient presents with (CHOOSE ONE) elements from any of the following: body structures and functions, activity limitations, and/or participation restrictions.  Leading to a clinical presentation that is considered (CHOOSE ONE)                              Clinical Decision Making  (Eval Complexity):  Choose One     Time Tracking:     PT Received On: 02/26/18  PT Start Time: 1434     PT Stop Time: 1459  PT Total Time (min): 25 min     Billable Minutes: Evaluation 10 and Gait Training 15      Keanu Leo, PT  02/26/2018

## 2018-02-26 NOTE — PLAN OF CARE
Patient verified signature on consent. RN writer witnessed consent. Patient states that she has no further questions about procedure planned for this am.

## 2018-02-26 NOTE — ANESTHESIA POSTPROCEDURE EVALUATION
"Anesthesia Post Evaluation    Patient: Brooks Browning    Procedure(s) Performed: Procedure(s) (LRB):  REPLACEMENT-KNEE-TOTAL (Left)    Final Anesthesia Type: general  Patient location during evaluation: PACU  Patient participation: Yes- Able to Participate  Level of consciousness: awake and alert  Post-procedure vital signs: reviewed and stable  Pain management: adequate  Airway patency: patent  PONV status at discharge: No PONV  Anesthetic complications: no      Cardiovascular status: hemodynamically stable  Respiratory status: unassisted  Hydration status: euvolemic  Follow-up not needed.        Visit Vitals  /78   Pulse 67   Temp 36.9 °C (98.4 °F) (Oral)   Resp 20   Ht 5' 8" (1.727 m)   Wt 78.5 kg (173 lb)   SpO2 99%   Breastfeeding? No   BMI 26.30 kg/m²       Pain/Soo Score: Pain Assessment Performed: Yes (2/26/2018 10:28 AM)  Presence of Pain: complains of pain/discomfort (2/26/2018 11:00 AM)  Soo Score: 9 (2/26/2018 11:00 AM)      "

## 2018-02-26 NOTE — PLAN OF CARE
PRASANTH met with pt at bedside to complete discharge assessment. Pt needs rolling walker and bedside commode.  SW verified PCP and uses [facility] at [place].  Pt needs RW and BSC and will dc outpt PT.     02/26/18 8144   Discharge Assessment   Assessment Type Discharge Planning Assessment   Confirmed/corrected address and phone number on facesheet? Yes   Assessment information obtained from? Patient   Communicated expected length of stay with patient/caregiver no   Prior to hospitilization cognitive status: Alert/Oriented   Prior to hospitalization functional status: Independent   Current cognitive status: Alert/Oriented   Current Functional Status: Assistive Equipment   Lives With grandparent(s)   Able to Return to Prior Arrangements yes   Is patient able to care for self after discharge? Unable to determine at this time (comments)   Who are your caregiver(s) and their phone number(s)? (Karley, daughter, [phone])   Patient's perception of discharge disposition home or selfcare   Readmission Within The Last 30 Days no previous admission in last 30 days   Patient currently being followed by outpatient case management? No   Patient currently receives any other outside agency services? No   Equipment Currently Used at Home none   Do you have any problems affording any of your prescribed medications? No   Is the patient taking medications as prescribed? yes   Does the patient have transportation home? Yes   Transportation Available family or friend will provide   Does the patient receive services at the [facility]? No   Discharge Plan A Home   Patient/Family In Agreement With Plan yes

## 2018-02-26 NOTE — PROGRESS NOTES
PRASANTH met with pt and pt would like to go outpt at [facility], closer to home.    PRASANTH called Dr. North's office [phone], spoke to 's nurse, Rachael, informed pt has Medicaid.  Rachael stated usually does outpt PT and that's okay for pt and pt's preferred location [place] is okay    PRASANTH called [facility], [phone], spoke to Renee, accept Medicaid/[employer] and have available appts for Wed, need order.    PRASANTH scheduled Outpt PT at [facility] for 2/28/18 at 10am, placed appt on AVS and informed pt.

## 2018-02-26 NOTE — PLAN OF CARE
Problem: Physical Therapy Goal  Goal: Physical Therapy Goal  Goals to be met by 2-26-18.  1. Sup<>sit mod I  2. Sit<>stand with RW mod I  3. amb 150' with RW mod I  4. Up/down 3 steps B rail mod I     -    Comments: Good progress in therapy ambulated 120' with RW CGA.

## 2018-02-27 VITALS
TEMPERATURE: 98 F | WEIGHT: 172.81 LBS | BODY MASS INDEX: 26.19 KG/M2 | DIASTOLIC BLOOD PRESSURE: 77 MMHG | SYSTOLIC BLOOD PRESSURE: 129 MMHG | RESPIRATION RATE: 18 BRPM | HEIGHT: 68 IN | HEART RATE: 73 BPM | OXYGEN SATURATION: 100 %

## 2018-02-27 LAB
BASOPHILS # BLD AUTO: 0.02 K/UL
BASOPHILS NFR BLD: 0.3 %
DIFFERENTIAL METHOD: ABNORMAL
EOSINOPHIL # BLD AUTO: 0.1 K/UL
EOSINOPHIL NFR BLD: 0.8 %
ERYTHROCYTE [DISTWIDTH] IN BLOOD BY AUTOMATED COUNT: 12.9 %
HCT VFR BLD AUTO: 33.9 %
HGB BLD-MCNC: 11.6 G/DL
LYMPHOCYTES # BLD AUTO: 1.1 K/UL
LYMPHOCYTES NFR BLD: 16.9 %
MCH RBC QN AUTO: 32 PG
MCHC RBC AUTO-ENTMCNC: 34.2 G/DL
MCV RBC AUTO: 94 FL
MONOCYTES # BLD AUTO: 0.9 K/UL
MONOCYTES NFR BLD: 14 %
NEUTROPHILS # BLD AUTO: 4.5 K/UL
NEUTROPHILS NFR BLD: 67.7 %
PLATELET # BLD AUTO: 237 K/UL
PMV BLD AUTO: 8.8 FL
POCT GLUCOSE: 192 MG/DL (ref 70–110)
POCT GLUCOSE: 305 MG/DL (ref 70–110)
RBC # BLD AUTO: 3.62 M/UL
WBC # BLD AUTO: 6.63 K/UL

## 2018-02-27 PROCEDURE — 97116 GAIT TRAINING THERAPY: CPT

## 2018-02-27 PROCEDURE — 97165 OT EVAL LOW COMPLEX 30 MIN: CPT

## 2018-02-27 PROCEDURE — 97530 THERAPEUTIC ACTIVITIES: CPT

## 2018-02-27 PROCEDURE — 97535 SELF CARE MNGMENT TRAINING: CPT

## 2018-02-27 PROCEDURE — 85025 COMPLETE CBC W/AUTO DIFF WBC: CPT

## 2018-02-27 PROCEDURE — 36415 COLL VENOUS BLD VENIPUNCTURE: CPT

## 2018-02-27 PROCEDURE — 0SRD0J9 REPLACEMENT OF LEFT KNEE JOINT WITH SYNTHETIC SUBSTITUTE, CEMENTED, OPEN APPROACH: ICD-10-PCS | Performed by: ORTHOPAEDIC SURGERY

## 2018-02-27 PROCEDURE — 25000003 PHARM REV CODE 250: Performed by: ORTHOPAEDIC SURGERY

## 2018-02-27 PROCEDURE — 97110 THERAPEUTIC EXERCISES: CPT

## 2018-02-27 PROCEDURE — 63600175 PHARM REV CODE 636 W HCPCS: Performed by: ORTHOPAEDIC SURGERY

## 2018-02-27 RX ORDER — NAPROXEN SODIUM 220 MG/1
81 TABLET, FILM COATED ORAL 2 TIMES DAILY
Qty: 28 TABLET | Refills: 0 | Status: SHIPPED | OUTPATIENT
Start: 2018-02-27 | End: 2019-02-27

## 2018-02-27 RX ORDER — OXYCODONE AND ACETAMINOPHEN 5; 325 MG/1; MG/1
2 TABLET ORAL EVERY 6 HOURS PRN
Qty: 60 TABLET | Refills: 0 | Status: ON HOLD | OUTPATIENT
Start: 2018-02-27 | End: 2022-06-01 | Stop reason: SDUPTHER

## 2018-02-27 RX ADMIN — OXYCODONE HYDROCHLORIDE 10 MG: 5 TABLET ORAL at 10:02

## 2018-02-27 RX ADMIN — FAMOTIDINE 20 MG: 20 TABLET, FILM COATED ORAL at 08:02

## 2018-02-27 RX ADMIN — MUPIROCIN 1 G: 20 OINTMENT TOPICAL at 08:02

## 2018-02-27 RX ADMIN — CELECOXIB 200 MG: 200 CAPSULE ORAL at 08:02

## 2018-02-27 RX ADMIN — DOCUSATE SODIUM 100 MG: 100 CAPSULE, LIQUID FILLED ORAL at 08:02

## 2018-02-27 RX ADMIN — ENOXAPARIN SODIUM 30 MG: 100 INJECTION SUBCUTANEOUS at 08:02

## 2018-02-27 RX ADMIN — OXYCODONE HYDROCHLORIDE 10 MG: 5 TABLET ORAL at 05:02

## 2018-02-27 NOTE — PLAN OF CARE
Problem: Occupational Therapy Goal  Goal: Occupational Therapy Goal  Goals to be met by: 3/31/2018    Patient will increase functional independence with ADLs by performing:    LE Dressing with Supervision.  Grooming while standing at sink with Supervision.  Toileting from toilet with Supervision for hygiene and clothing management.   Supine to sit with Modified [place].  Toilet transfer to toilet with Supervision.    Outcome: Ongoing (interventions implemented as appropriate)  OT initial eval completed and treatment initiated. Pt. guarded with LLE movement during fx mobility with RW .  Pain reported at 0/10 but pt reported increased stiffness as compared to yesterday after sx.  She would benefit from continued OT and OPPT at discharge from acute. DME already delivered to her room: TTB, 3n1

## 2018-02-27 NOTE — PLAN OF CARE
Problem: Patient Care Overview  Goal: Plan of Care Review  Outcome: Ongoing (interventions implemented as appropriate)  Pt remains free from falls or injury. Vital signs and BS monitored throughout night on room air. positions self independently.Solorzano to gravity. Pain managed with  PO medications, no complaints of nausea, snack given.Bed in low locked position and call light with in reach. Will continue to monitor.

## 2018-02-27 NOTE — PROGRESS NOTES
Patient was scheduled for L TKA today but the surgery was cancelled due to equipment problems. Will reschedule.

## 2018-02-27 NOTE — PROGRESS NOTES
SW delivered rolling walker, bedside commode and transfer tub bench (reiterated, will be billed for TTB $75.00) and pt signed for delivery.

## 2018-02-27 NOTE — PT/OT/SLP EVAL
Occupational Therapy   Evaluation/treatment    Name: Brooks Browning  MRN: [id number]  Admitting Diagnosis:  Pain in joint of left knee 1 Day Post-Op; Pre-op Diagnosis: Pain in joint of left knee [M25.562] s/p Procedure(s):  REPLACEMENT-KNEE-TOTAL     Recommendations:     Discharge Recommendations: outpatient PT  Discharge Equipment Recommendations:   (bath bench, 3n1 and RW delivered to room)  Barriers to discharge:  None    History:     Occupational Profile:  Living Environment: lives with 2 adult sons in [facility] with 5 steps and BHR; has tub/shower combo  Previous level of function: Indep PLOF ADLS and IADLS; drives; on disability  Roles and Routines: active  Equipment Owned:  none  Assistance upon Discharge: yes sons/children    Past Medical History:   Diagnosis Date    Arthritis     Diabetes mellitus     Elevated cholesterol     Hypertension        Past Surgical History:   Procedure Laterality Date     SECTION      HIP SURGERY Right     HYSTERECTOMY      JOINT REPLACEMENT Bilateral     hip       Subjective     Chief Complaint:stiffness L knee  Patient/Family stated goals: none  Communicated with: nurse prior to session.  Pain/Comfort:  · Pain Rating 1: 0/10  · Location - Side 1: Left  · Location - Orientation 1: generalized  · Location 1: knee  · Pain Addressed 1: Pre-medicate for activity, Reposition, Distraction  · Pain Rating Post-Intervention 1: 0/10    Patients cultural, spiritual, Restorationism conflicts given the current situation: na    Objective:     Patient found with: SCD    General Precautions: Standard, fall ; diabetic  Orthopedic Precautions:LLE weight bearing as tolerated   Braces: N/A     Occupational Performance:    Bed Mobility:    · Patient completed Supine to Sit with contact guard assistance    Functional Mobility/Transfers:  · Patient completed Sit <> Stand Transfer with contact guard assistance  with  rolling walker and wall grab bar from toilet  · Patient completed Bed <> Chair  Transfer using Step Transfer technique with stand by assistance with RW  · Patient completed Toilet Transfer Step Transfer technique with contact guard assistance with  rolling walker  · Functional Mobility: CGA bed>bathroom with RW>sink in bathroom and SBA bathroom>sink in room and to BS chair on opposite side of the room; mod vc to bend L knee     Activities of Daily Living:  · Feeding:  independence BS cahir  · Grooming: stand by assistance standing at sink in room with RW but CGA initially iin bathroom at sink for hand hygiene after toileting  · Bathing: minimum assistance sponge bath for back area seated EOB and CGA standing for periarea with unilateral suport on RW  · UB Dressing: independence bra and pullover seated EOB  · LB Dressing: minimum assistance to thread LLE into underwear and CGA to standing to pull over hips with RW for safety  · Toileting:  Contact guard with RW for clothes management    Cognitive/Visual Perceptual:  Cognitive/Psychosocial Skills:     -       Oriented to: Person, Place, Time and Situation   -       Follows Commands/attention:Follows one-step commands  -       Communication: clear/fluent  -       Safety awareness/insight to disability: intact   Visual/Perceptual:      -Intact    Physical Exam:  Balance:    -       static sitting;  good; dynamic siting;  fair; static standing;  fair ; dynamic standing ;  fair -    Patient left up in chair with all lines intact, call button in reach, nurse notified and daughter present    [facility] 6 Click:  [facility] Total Score: 20    Treatment & Education:  Role of OT And POC; fx ambulation with RW mod vc to bend L knee; toilet stand step t/f with RW and wall grab bar; 3n1 instruction for over toilet; family training with daughter/observation; sponge bath min asist; LB dressing don operated leg first and keep RW to stand safely from chair /EOB  Education:    Assessment:     Brooks Browning is a 52 y.o. female with a medical diagnosis of Pain in joint of  "left knee.  She presents with  OT initial eval completed and treatment initiated. Pt. guarded with LLE movement during fx mobility with RW .  Pain reported at 0/10 but pt reported increased stiffness as compared to yesterday after sx.  She would benefit from continued OT and OPPT at discharge from acute. DME already delivered to her room: TTB, 3n1    Performance deficits affecting function are impaired self care skills, decreased ROM, decreased lower extremity function, gait instability, impaired functional mobilty, impaired joint extensibility.      Rehab Prognosis:  good; patient would benefit from acute skilled OT services to address these deficits and reach maximum level of function.         Clinical Decision Makin.  OT Low:  "Pt evaluation falls under low complexity for evaluation coding due to performance deficits noted in 1-3 areas as stated above and 0 co-morbities affecting current functional status. Data obtained from problem focused assessments. No modifications or assistance was required for completion of evaluation. Only brief occupational profile and history review completed."     Plan:     Patient to be seen daily to address the above listed problems via self-care/home management, therapeutic activities  · Plan of Care Expires: 18  · Plan of Care Reviewed with: patient, daughter    This Plan of care has been discussed with the patient who was involved in its development and understands and is in agreement with the identified goals and treatment plan    GOALS:    Occupational Therapy Goals        Problem: Occupational Therapy Goal    Goal Priority Disciplines Outcome Interventions   Occupational Therapy Goal     OT, PT/OT Ongoing (interventions implemented as appropriate)    Description:  Goals to be met by: 3/31/2018    Patient will increase functional independence with ADLs by performing:    LE Dressing with Supervision.  Grooming while standing at sink with Supervision.  Toileting from " toilet with Supervision for hygiene and clothing management.   Supine to sit with Modified [place].  Toilet transfer to toilet with Supervision.                      Time Tracking:     OT Date of Treatment: 02/27/18  OT Start Time: 0953  OT Stop Time: 1024  OT Total Time (min): 31 min    Billable Minutes:Evaluation 15  Self Care/Home Management 16  Total Time 31    Alexandrea Dudley OT  2/27/2018

## 2018-02-27 NOTE — PLAN OF CARE
Problem: Physical Therapy Goal  Goal: Physical Therapy Goal  Goals to be met by 2-26-18.  1. Sup<>sit mod I  2. Sit<>stand with RW mod I  3. amb 150' with RW mod I  4. Up/down 3 steps B rail mod I     Outcome: Ongoing (interventions implemented as appropriate)    Patient is progressing well towards goals. Patient required CGA/SBA for functional mobility.

## 2018-02-27 NOTE — PLAN OF CARE
02/27/18 0757   Final Note   Assessment Type Final Discharge Note   Discharge Disposition Home  ([facility])   What phone number can be called within the next 1-3 days to see how you are doing after discharge? ([phone])   Hospital Follow Up  Appt(s) scheduled? No   Discharge plans and expectations educations in teach back method with documentation complete? No

## 2018-02-27 NOTE — OP NOTE
DATE OF PROCEDURE:  02/26/2018    PREOPERATIVE DIAGNOSIS:  Degenerative joint disease of the left knee.    POSTOPERATIVE DIAGNOSIS:  Degenerative joint disease of the left knee.    PROCEDURE:  Left total knee arthroplasty.    SURGEON:  Robbin North M.D.    ANESTHESIA:  General.    ESTIMATED BLOOD LOSS:  150 mL    COMPLICATIONS:  None.    DRAINS:  None.    INDICATIONS:  The patient is a 52-year-old female who presents with a long   history of left knee pain.  She was evaluated by me and with physical exam and   x-rays and found to have advanced degenerative joint disease of the left knee.    She had previously failed nonoperative treatment such as injections,   nonsteroidal anti-inflammatory medication, physical therapy and bracing.  She   was then offered surgical intervention in the form of left total knee   arthroplasty for which she agreed and was then scheduled for surgery and on   02/26/2018.  The patient was presented to [facility] through   Same Day Surgery and was taken to the Operating Room and placed on the   operating table in the supine position.  The left lower extremity was prepped   and draped in the usual sterile fashion after adequate general anesthesia was   administered.  Then, a tourniquet was applied to the left lower extremity and a   longitudinal incision was made over an anterior aspect of the left knee.    Dissection was carried down through subcutaneous tissue to the level of the   retinaculum, quadriceps tendon and patellar tendon.  Then, a 2 subcutaneous   flaps were created both medially and laterally and were retracted and a medial   parapatellar incision was made using a 10 blade knife.  The anterior distal   femoral fat pad was resected as well as a portion of the patella fat pad.  Then,   the patella was then flipped and the knee was then brought into flexion and the   portion of the anterior capsule both medially and laterally were reflected.    Then, the  femoral canal was drilled and then a distal femoral cutting jig was   placed on the end of the distal femur.  After adequate fixation of the distal   femoral cutting block, a distal femoral cut was made.  Then a sizing block was   placed on the distal femur and it was sized and found to be a size 5.  Then, the   sizing block was then removed, then a size 5 distal femoral cutting block   placed on the distal femur and the distal femoral and the anterior, posterior   and chamfer cuts were made.  Then, a cutting block was removed and retractors   were placed about the proximal tibia.  A posterior cruciate retractor was placed   and the anterior cruciate ligament was divided.  Then an intramedullary   proximal tibia cutting jig was placed on the proximal tibia and the proximal   tibia was set to take a skim cut off of the lateral side, which was the most   affected.  Then a proximal tibia cut was made and the cuff portion of the   proximal tibia was resected and removed.  Then, the residual medial and lateral   menisci were removed.  Then, the proximal tibia was sized for a baseplate, a   size 4 tibial base plate fit best.  Then, a trial size 4 tibial baseplate was   fixed to the proximal tibia using a punch and then the trial femoral implant of   size 5 was placed on the distal femur and then lug holes were then drilled   through the appropriate drill holes.  Then, a size 10 mm insert was placed into   the tibial tray, and the femur and then the knee was then reduced and taken   through a range of motion.  A full extension was obtained and the knee was   stable to varus and valgus stress.  Then, all the trial implants were removed   and a patella cutting jig was placed on the patella and 8 mm cut was performed   and the patella was then sized and found to fit a 32 mm patella.  Then, the   trial patella was then removed and the knee joint was irrigated with copious   amounts of irrigation.  The cement was mixed, and a  MicroPort size 4 tibial   baseplate was cemented to the proximal tibia followed by cementation of a size 5   distal femoral implant.  Then a size 10 tibial insert was placed into the   tibial tray, and the knee was then brought into extension and then back into   flexion.  An excess cement was then removed and the knee was then brought back   into extension.  Then a size 32 mm patella was cemented to 32 mm patellar   button.  It was cemented to the patella.  All implants were MicroPort implants.    After the cement had hardened, the knee was then examined again and there was   some slight varus valgus laxity, therefore the 10 mm trial insert was removed   and the 12 mm insert was placed in the tibial tray.  This gave more stability to   the left knee.  This fit well and full extension was obtained.  Then the trial   insert was removed, then a permanent MicroPort size 4 tibial tray insert was   placed into the tibial tray and this fit well and was stable to varus and valgus   stress.  Then, the left knee was irrigated with copious amounts of irrigation.    The medial parapatellar incision was repaired using #1 Ethibond suture,   subcutaneous tissues closed using 2-0 Vicryl suture and the skin was closed   using staples.  A sterile dressing was then applied.  The patient tolerated the   procedure well and was transferred to Recovery Room in stable condition.      CPM/IN  dd: 02/27/2018 10:21:52 (CST)  td: 02/27/2018 13:49:33 (CST)  Doc ID   #[id number]  Job ID #[id number]    CC:

## 2018-02-27 NOTE — BRIEF OP NOTE
Pre Op Dx: DJD L knee  Post Op Dx: same  Procedure: L TKA  Surgeon: DALTON North  Anesthesia: general  EBL: 150cc  Complications: None  Drains: None  Patient tolerated procedure well and was transferred to PACU in stable condition.

## 2018-02-27 NOTE — PT/OT/SLP PROGRESS
Physical Therapy Treatment    Patient Name:  Brooks Browning   MRN:  [id number]    Recommendations:     Discharge Recommendations:  outpatient PT (unable to get HHPT)   Discharge Equipment Recommendations:  (none already delivered )   Barriers to discharge: None    Assessment:     Brooks Browning is a 52 y.o. female admitted with a medical diagnosis of Pain in joint of left knee.  She presents with the following impairments/functional limitations:  weakness, impaired endurance, decreased ROM, impaired functional mobilty, decreased lower extremity function patient tolerated treatment session well and progressing well towards goals.    Rehab Prognosis:  good; patient would benefit from acute skilled PT services to address these deficits and reach maximum level of function.      Recent Surgery: Procedure(s) (LRB):  REPLACEMENT-KNEE-TOTAL (Left) 1 Day Post-Op    Plan:     During this hospitalization, patient to be seen BID to address the above listed problems via gait training, therapeutic activities, therapeutic exercises  · Plan of Care Expires:  03/26/18   Plan of Care Reviewed with: patient, daughter    Subjective     Communicated with nurse prior to session.  Patient found seated in bedside chair upon PT entry to room, agreeable to treatment.      Chief Complaint: patient was agreeable to participate.   Patient comments/goals: patient reported I want to move more and want my leg to feel better.   Pain/Comfort:  · Pain Rating 1: 4/10  · Location - Side 1: Left  · Location - Orientation 1: generalized  · Location 1: knee  · Pain Addressed 1: Pre-medicate for activity, Reposition, Distraction  · Pain Rating Post-Intervention 1: 0/10    Patients cultural, spiritual, Congregational conflicts given the current situation: none    Objective:     Patient found with: peripheral IV     General Precautions: Standard, fall, diabetic   Orthopedic Precautions:LLE weight bearing as tolerated   Braces: N/A     Functional Mobility:  · Sit  to stand from bedside chair X 2 trials with rolling required SBA for safety verbal cues for LLE positioning.   · Stand to sit onto bedside chair X 2 trials with Rolling walker Supervision   · Patient gait trained 75 feet X 2 trials with Rolling walker decrease left knee flexion, medial heel whip; verbal cues for proper sequencing. Heel strike/toe off.     AM-PAC 6 CLICK MOBILITY  Turning over in bed (including adjusting bedclothes, sheets and blankets)?: 3  Sitting down on and standing up from a chair with arms (e.g., wheelchair, bedside commode, etc.): 3  Moving from lying on back to sitting on the side of the bed?: 3  Moving to and from a bed to a chair (including a wheelchair)?: 3  Need to walk in hospital room?: 3  Climbing 3-5 steps with a railing?: 3  Total Score: 18       Therapeutic Activities and Exercises:  Patient performed therex X 20 reps AROM/AAROM per TKA protocol patient required increase time and rest breaks.     Patient left up in chair with all lines intact, call button in reach, nurse notified and daughter present  present..    GOALS:    Physical Therapy Goals        Problem: Physical Therapy Goal    Goal Priority Disciplines Outcome Goal Variances Interventions   Physical Therapy Goal     PT/OT, PT Ongoing (interventions implemented as appropriate)     Description:  Goals to be met by 2-26-18.  1. Sup<>sit mod I  2. Sit<>stand with RW mod I  3. amb 150' with RW mod I  4. Up/down 3 steps B rail mod I                      Time Tracking:     PT Received On: 02/27/18  PT Start Time: 1122     PT Stop Time: 1200  PT Total Time (min): 38 min     Billable Minutes: Gait Training 10, Therapeutic Activity 13 and Therapeutic Exercise 15    Treatment Type: Treatment  PT/PTA: KAILEE Tang PTA  02/27/2018

## 2018-02-28 NOTE — PT/OT/SLP DISCHARGE
Physical Therapy Discharge Summary    Name: Brooks Browning  MRN: [id number]   Principal Problem: Pain in joint of left knee     Patient Discharged from acute Physical Therapy on 2/27/18.  Please refer to prior PT noted date on 2/27/18 for functional status.     Assessment:     Goals partially met.    Objective:     GOALS:    Physical Therapy Goals        Problem: Physical Therapy Goal    Goal Priority Disciplines Outcome Goal Variances Interventions   Physical Therapy Goal     PT/OT, PT Ongoing (interventions implemented as appropriate)     Description:  Goals to be met by 2-26-18.  1. Sup<>sit mod I  2. Sit<>stand with RW mod I  3. amb 150' with RW mod I  4. Up/down 3 steps B rail mod I                      Reasons for Discontinuation of Therapy Services  Transfer to alternate level of care.      Plan:     Patient Discharged to: Outpatient Therapy Services. PT of record not available for documentation.      Penny Levin, PT  2/28/2018

## 2018-02-28 NOTE — PT/OT/SLP DISCHARGE
2Occupational Therapy Discharge Summary    Brooks Browning  MRN: [id number]   Principal Problem: Pain in joint of left knee      Patient Discharged from acute Occupational Therapy on 02/27/18.  Please refer to prior OT note dated 02/27/18 for functional status.    Assessment:      Patient appropriate for care in another setting.    Objective:     GOALS:    Occupational Therapy Goals        Problem: Occupational Therapy Goal    Goal Priority Disciplines Outcome Interventions   Occupational Therapy Goal     OT, PT/OT Ongoing (interventions implemented as appropriate)    Description:  Goals to be met by: 3/31/2018    Patient will increase functional independence with ADLs by performing:    LE Dressing with Supervision.  Grooming while standing at sink with Supervision.  Toileting from toilet with Supervision for hygiene and clothing management.   Supine to sit with Modified Door.  Toilet transfer to toilet with Supervision.                      Reasons for Discontinuation of Therapy Services  Transfer to alternate level of care.      Plan:     Patient Discharged to: Outpatient Therapy Services     OT of record unavailable for documentation.    ZONIA Hurst  2/27/2018

## 2018-03-07 NOTE — PHYSICIAN QUERY
PT Name: Brooks Browning  MR #: [id number]     Physician Query Form - Documentation Clarification      CDS/: Andra Wagner RN               Contact information:[email or web address]    This form is a permanent document in the medical record.     Query Date: March 7, 2018    By submitting this query, we are merely seeking further clarification of documentation. Please utilize your independent clinical judgment when addressing the question(s) below.    The Medical record reflects the following:    Supporting Clinical Findings Location in Medical Record   Diabetes  Metformin  Blood sugar 147   H&P past medical history  H&P medications  Labs 2/26   Insulin aspart U-100 pen 0-5 units PRN       MAR 2/26                                                                            Doctor, Please specify diagnosis or diagnoses associated with above clinical findings.    Provider Use Only    [   ] Diabetes type 2 uncontrolled with hyperglycemia    [   ] Diabetes type 2 uncontrolled with ____________________    [   ] Diabetes type 2 controlled      [   ] Other ____________________                                                                                                           [  ] Clinically undetermined

## 2018-03-07 NOTE — PHYSICIAN QUERY
"PT Name: Brooks Browning  MR #: [id number]    Physician Query Form - Hematology Clarification      CDS/: Andra Wagner RN               Contact information:[email or web address]    This form is a permanent document in the medical record.      Query Date: March 7, 2018    By submitting this query, we are merely seeking further clarification of documentation. Please utilize your independent clinical judgment when addressing the question(s) below.    The Medical record contains the following:   Indicators  Supporting Clinical Findings Location in Medical Record    "Anemia" documented     x H & H = H&H= 14.1/41.5  H&H= 11.6/33.9 Labs 2/26  Labs 2/27    BP =                     HR=      "GI bleeding" documented      Acute bleeding (Non GI site)      Transfusion(s)      Treatment:     x Other:  DJD L knee. L total knee arthroplasty. EBL 150ml    Diabetes, HTN OP note 2/27    H&P     Provider, please specify diagnosis or diagnoses associated with above clinical findings.    [  ] Acute blood loss anemia expected post-operatively  [  ] Acute blood loss anemia  [  ] Iron deficiency anemia  [  ] Precipitous drop in Hematocrit      [  ] Other Hematological Diagnosis (please specify): _________________________________    [  ] Clinically Undetermined       Please document in your progress notes daily for the duration of treatment, until resolved, and include in your discharge summary.                                                                                                      "

## 2018-03-22 NOTE — PHYSICIAN QUERY
"PT Name: Brooks Browning  MR #: [id number]    Physician Query Form - Hematology Clarification      CDS/: Andra Wagner RN               Contact information:[email or web address]    This form is a permanent document in the medical record.      Query Date: March 22, 2018    By submitting this query, we are merely seeking further clarification of documentation. Please utilize your independent clinical judgment when addressing the question(s) below.    The Medical record contains the following:   Indicators  Supporting Clinical Findings Location in Medical Record    "Anemia" documented     x H & H = H&H= 14.1/41.5  H&H= 11.6/33.9 Labs 2/26  Labs 2/27    BP =                     HR=      "GI bleeding" documented      Acute bleeding (Non GI site)      Transfusion(s)      Treatment:     x Other:  DJD L knee. L total knee arthroplasty. EBL 150ml    Diabetes, HTN OP note 2/27    H&P     Provider, please specify diagnosis or diagnoses associated with above clinical findings.    [  ] Acute blood loss anemia expected post-operatively  [  ] Acute blood loss anemia  [  ] Iron deficiency anemia  [  ] Precipitous drop in Hematocrit      [  ] Other Hematological Diagnosis (please specify): _________________________________    [  ] Clinically Undetermined       Please document in your progress notes daily for the duration of treatment, until resolved, and include in your discharge summary.                                                                                                      "

## 2018-03-22 NOTE — PHYSICIAN QUERY
PT Name: Brooks Browning  MR #: [id number]     Physician Query Form - Documentation Clarification      CDS/: Andra Wagner RN               Contact information:[email or web address]    This form is a permanent document in the medical record.     Query Date: March 22, 2018    By submitting this query, we are merely seeking further clarification of documentation. Please utilize your independent clinical judgment when addressing the question(s) below.    The Medical record reflects the following:    Supporting Clinical Findings Location in Medical Record   Diabetes  Metformin  Blood sugar 147   H&P past medical history  H&P medications  Labs 2/26   Insulin aspart U-100 pen 0-5 units PRN       MAR 2/26                                                                            Doctor, Please specify diagnosis or diagnoses associated with above clinical findings.    Provider Use Only    [   ] Diabetes type 2 uncontrolled with hyperglycemia    [   ] Diabetes type 2 uncontrolled with ____________________    [   ] Diabetes type 2 controlled      [   ] Other ____________________                                                                                                           [  ] Clinically undetermined

## 2018-03-26 NOTE — DISCHARGE SUMMARY
DATE OF ADMISSION:  02/26/2018    DATE OF DISCHARGE:  02/27/2018.    HOSPITAL COURSE:  The patient is a 52-year-old female who presented to [facility] through Same Day Surgery and underwent a left total knee   arthroplasty.  The patient tolerated the procedure well and did well   postoperatively in physical therapy.  She was discharged to home in stable   condition on 02/27/2018.  The patient was discharged home on DVT prophylaxis as   well as pain medication and was scheduled for outpatient physical therapy.  She   also was scheduled for followup in my office in two weeks.      CPM/HN  dd: 03/25/2018 23:02:56 (CDT)  td: 03/26/2018 03:25:20 (CDT)  Doc ID   #[id number]  Job ID #[id number]    CC:

## 2018-03-27 NOTE — PHYSICIAN QUERY
PT Name: Brooks Browning  MR #: [id number]     Physician Query Form - Documentation Clarification      CDS/: Andra Wagner RN               Contact information:[email or web address]    This form is a permanent document in the medical record.     Query Date: March 27, 2018    By submitting this query, we are merely seeking further clarification of documentation. Please utilize your independent clinical judgment when addressing the question(s) below.    The Medical record reflects the following:    Supporting Clinical Findings Location in Medical Record   Diabetes  Metformin  Blood sugar 147   H&P past medical history  H&P medications  Labs 2/26   Insulin aspart U-100 pen 0-5 units PRN       MAR 2/26                                                                            Doctor, Please specify diagnosis or diagnoses associated with above clinical findings.    Provider Use Only    [   ] Diabetes type 2 uncontrolled with hyperglycemia    [   ] Diabetes type 2 uncontrolled with ____________________    [ X] Diabetes type 2 controlled      [   ] Other ____________________                                                                                                           [  ] Clinically undetermined

## 2018-03-27 NOTE — PHYSICIAN QUERY
"PT Name: Brooks Browning  MR #: [id number]    Physician Query Form - Hematology Clarification      CDS/: Andra Wagner RN               Contact information:[email or web address]    This form is a permanent document in the medical record.      Query Date: March 27, 2018    By submitting this query, we are merely seeking further clarification of documentation. Please utilize your independent clinical judgment when addressing the question(s) below.    The Medical record contains the following:   Indicators  Supporting Clinical Findings Location in Medical Record    "Anemia" documented     x H & H = H&H= 14.1/41.5  H&H= 11.6/33.9 Labs 2/26  Labs 2/27    BP =                     HR=      "GI bleeding" documented      Acute bleeding (Non GI site)      Transfusion(s)      Treatment:     x Other:  DJD L knee. L total knee arthroplasty. EBL 150ml    Diabetes, HTN OP note 2/27    H&P     Provider, please specify diagnosis or diagnoses associated with above clinical findings.    [ X ] Acute blood loss anemia expected post-operatively  [  ] Acute blood loss anemia  [  ] Iron deficiency anemia  [  ] Precipitous drop in Hematocrit      [  ] Other Hematological Diagnosis (please specify): _________________________________    [  ] Clinically Undetermined       Please document in your progress notes daily for the duration of treatment, until resolved, and include in your discharge summary.                                                                                                      "

## 2018-06-29 DIAGNOSIS — M25.569 KNEE JOINT PAIN: Primary | ICD-10-CM

## 2021-10-05 NOTE — H&P (VIEW-ONLY)
HISTORY OF PRESENT ILLNESS:      48-year-old female with a history of long standing left   knee pain for many years. She denies and history of trauma or major surgical intervention to the knee. She has tried injections, NSAIDs, physical therapy, and brace without lasting relief. The pain is limiting to her activity level and mobility. She is frustrated with the pain and is requesting total knee arthroplasty.         PAST MEDICAL HISTORY   1.   Diabetes.   2.   Hypertension.   3.   Hyperlipidemia.      SOCIAL HISTORY:  She does not use tobacco.  She occasionally uses alcohol.      ALLERGIES:         Review of patient's allergies indicates:   Allergen Reactions    Morphine Rash            MEDICATIONS   1.   Metformin.   2.   Lisinopril.   3.   Amlodipine.   4.   Statin.   5.   Potassium chloride.   6.   Multivitamin.   7.   Calcium.   8.   Aspirin.   9.   Wellbutrin.      FAMILY HISTORY:  Her mother has a history of cancer and diabetes.      REVIEW OF SYSTEMS:  She also reports some occasional chills, some chest pain on   occasion, some numbness, and some musculoskeletal joint pains.      PHYSICAL EXAMINATION   VITAL SIGNS:  Height 5 feet 8 inches; weight 187 pounds.   AFVSS  GENERAL:  Well-appearing, well-nourished female.  Awake and oriented.  In no acute   distress.  Cooperative with exam.  Gross valgus deformity of the left knee on   overall inspection.   HEENT:  Atraumatic, normocephalic.   LUNGS:  No increased work of breathing.   SKIN:  No skin lesions.   MUSCULOSKELETAL:    Left lower extremity:   Range of motion from 0-95 degrees.    Fixed valgus deformity of the left knee  Significant lateral joint space tenderness to   palpation distally.    LTS intact   5/5 EHL, FHL, gastrocsoleus, tibialis anterior, and motor function  Has 2+ dorsalis pedis pulse     DIAGNOSTICS   1.   Two views of the left knee show significant valgus osteoarthritis with        complete joint space loss of the lateral compartment.       ASSESSMENT:  Left knee arthritis.      PLAN   To OR today for left total knee arthroplasty     no

## 2022-05-24 ENCOUNTER — HOSPITAL ENCOUNTER (INPATIENT)
Facility: HOSPITAL | Age: 57
LOS: 8 days | Discharge: HOME | DRG: 335 | End: 2022-06-01
Attending: HOSPITALIST | Admitting: HOSPITALIST
Payer: MEDICAID

## 2022-05-24 DIAGNOSIS — E13.10 DIABETIC KETOACIDOSIS WITHOUT COMA ASSOCIATED WITH OTHER SPECIFIED DIABETES MELLITUS: ICD-10-CM

## 2022-05-24 DIAGNOSIS — E11.10 DKA (DIABETIC KETOACIDOSIS): ICD-10-CM

## 2022-05-24 DIAGNOSIS — K56.609 SMALL BOWEL OBSTRUCTION: Primary | ICD-10-CM

## 2022-05-24 PROBLEM — E87.6 HYPOKALEMIA: Status: ACTIVE | Noted: 2022-05-24

## 2022-05-24 LAB
ANION GAP SERPL CALC-SCNC: 15 MMOL/L (ref 8–16)
BUN SERPL-MCNC: 34 MG/DL (ref 6–20)
CALCIUM SERPL-MCNC: 9 MG/DL (ref 8.7–10.5)
CHLORIDE SERPL-SCNC: 103 MMOL/L (ref 95–110)
CO2 SERPL-SCNC: 17 MMOL/L (ref 23–29)
CREAT SERPL-MCNC: 1 MG/DL (ref 0.5–1.4)
EST. GFR  (AFRICAN AMERICAN): >60 ML/MIN/1.73 M^2
EST. GFR  (NON AFRICAN AMERICAN): >60 ML/MIN/1.73 M^2
GLUCOSE SERPL-MCNC: 111 MG/DL (ref 70–110)
HCG SERPL QL: NEGATIVE
POCT GLUCOSE: 122 MG/DL (ref 70–110)
POCT GLUCOSE: 163 MG/DL (ref 70–110)
POCT GLUCOSE: 188 MG/DL (ref 70–110)
POCT GLUCOSE: 243 MG/DL (ref 70–110)
POTASSIUM SERPL-SCNC: 3 MMOL/L (ref 3.5–5.1)
SODIUM SERPL-SCNC: 135 MMOL/L (ref 136–145)

## 2022-05-24 PROCEDURE — 84703 CHORIONIC GONADOTROPIN ASSAY: CPT | Performed by: HOSPITALIST

## 2022-05-24 PROCEDURE — 36415 COLL VENOUS BLD VENIPUNCTURE: CPT | Performed by: HOSPITALIST

## 2022-05-24 PROCEDURE — 80048 BASIC METABOLIC PNL TOTAL CA: CPT | Mod: 91 | Performed by: HOSPITALIST

## 2022-05-24 PROCEDURE — 25000003 PHARM REV CODE 250: Performed by: HOSPITALIST

## 2022-05-24 PROCEDURE — 63600175 PHARM REV CODE 636 W HCPCS: Performed by: HOSPITALIST

## 2022-05-24 PROCEDURE — S5010 5% DEXTROSE AND 0.45% SALINE: HCPCS | Performed by: HOSPITALIST

## 2022-05-24 PROCEDURE — 20000000 HC ICU ROOM

## 2022-05-24 PROCEDURE — 94761 N-INVAS EAR/PLS OXIMETRY MLT: CPT

## 2022-05-24 PROCEDURE — 83036 HEMOGLOBIN GLYCOSYLATED A1C: CPT | Performed by: HOSPITALIST

## 2022-05-24 RX ORDER — POTASSIUM CHLORIDE 7.45 MG/ML
40 INJECTION INTRAVENOUS
Status: DISCONTINUED | OUTPATIENT
Start: 2022-05-24 | End: 2022-05-26

## 2022-05-24 RX ORDER — HYDROMORPHONE HYDROCHLORIDE 1 MG/ML
0.5 INJECTION, SOLUTION INTRAMUSCULAR; INTRAVENOUS; SUBCUTANEOUS EVERY 4 HOURS PRN
Status: DISCONTINUED | OUTPATIENT
Start: 2022-05-24 | End: 2022-06-01 | Stop reason: HOSPADM

## 2022-05-24 RX ORDER — ZOLPIDEM TARTRATE 5 MG/1
5 TABLET ORAL NIGHTLY PRN
Status: DISCONTINUED | OUTPATIENT
Start: 2022-05-24 | End: 2022-05-29

## 2022-05-24 RX ORDER — MORPHINE SULFATE 2 MG/ML
2 INJECTION, SOLUTION INTRAMUSCULAR; INTRAVENOUS EVERY 4 HOURS PRN
Status: DISCONTINUED | OUTPATIENT
Start: 2022-05-24 | End: 2022-05-24

## 2022-05-24 RX ORDER — MAGNESIUM SULFATE HEPTAHYDRATE 40 MG/ML
2 INJECTION, SOLUTION INTRAVENOUS ONCE
Status: DISCONTINUED | OUTPATIENT
Start: 2022-05-24 | End: 2022-05-24

## 2022-05-24 RX ORDER — DEXTROSE MONOHYDRATE AND SODIUM CHLORIDE 5; .45 G/100ML; G/100ML
125 INJECTION, SOLUTION INTRAVENOUS CONTINUOUS
Status: DISCONTINUED | OUTPATIENT
Start: 2022-05-24 | End: 2022-05-26

## 2022-05-24 RX ORDER — PROCHLORPERAZINE EDISYLATE 5 MG/ML
5 INJECTION INTRAMUSCULAR; INTRAVENOUS EVERY 6 HOURS PRN
Status: DISCONTINUED | OUTPATIENT
Start: 2022-05-24 | End: 2022-06-01 | Stop reason: HOSPADM

## 2022-05-24 RX ORDER — ONDANSETRON 2 MG/ML
4 INJECTION INTRAMUSCULAR; INTRAVENOUS EVERY 6 HOURS PRN
Status: DISCONTINUED | OUTPATIENT
Start: 2022-05-24 | End: 2022-05-24

## 2022-05-24 RX ORDER — POTASSIUM CHLORIDE 7.45 MG/ML
60 INJECTION INTRAVENOUS
Status: DISCONTINUED | OUTPATIENT
Start: 2022-05-24 | End: 2022-05-26

## 2022-05-24 RX ORDER — POTASSIUM CHLORIDE 7.45 MG/ML
80 INJECTION INTRAVENOUS
Status: DISCONTINUED | OUTPATIENT
Start: 2022-05-24 | End: 2022-05-26

## 2022-05-24 RX ORDER — ACETAMINOPHEN 325 MG/1
650 TABLET ORAL EVERY 4 HOURS PRN
Status: DISCONTINUED | OUTPATIENT
Start: 2022-05-24 | End: 2022-06-01 | Stop reason: HOSPADM

## 2022-05-24 RX ORDER — SODIUM CHLORIDE 0.9 % (FLUSH) 0.9 %
10 SYRINGE (ML) INJECTION
Status: DISCONTINUED | OUTPATIENT
Start: 2022-05-24 | End: 2022-05-29

## 2022-05-24 RX ADMIN — PROCHLORPERAZINE EDISYLATE 5 MG: 5 INJECTION INTRAMUSCULAR; INTRAVENOUS at 08:05

## 2022-05-24 RX ADMIN — SODIUM CHLORIDE 1.2 UNITS/HR: 9 INJECTION, SOLUTION INTRAVENOUS at 11:05

## 2022-05-24 RX ADMIN — DEXTROSE AND SODIUM CHLORIDE 125 ML/HR: 5; .45 INJECTION, SOLUTION INTRAVENOUS at 09:05

## 2022-05-24 RX ADMIN — SODIUM CHLORIDE 1 UNITS/HR: 9 INJECTION, SOLUTION INTRAVENOUS at 09:05

## 2022-05-24 NOTE — PROVIDER TRANSFER
Outside Transfer Acceptance Note / [facility] Referral [facility]    Referring facility: [facility]   Referring provider: RAMONE GARCIA  Accepting facility: [facility]  Accepting provider: PANDA DILL  Reason for transfer: Higher Level of Care   Transfer diagnosis: Small-bowel obstruction, DKA, hyperglycemia  Transfer specialty requested: Hospital Medicine  Transfer specialty notified: yes  Transfer level: NUMBER 1-5: 2  Bed type requested: ICU   Isolation status: No active isolations   Admission class or status: IP- Inpatient    Narrative     56-year-old woman with PMH DM2 and HTN presented to [facility] with intractable vomiting, fever, chills, body aches and generalized weakness starting 8 days earlier.  On presentation /67, , RR 20, SpO2 100% (RA), T 98.1°.  PEx pos for abd distension, and abd tenderness in the left upper and lower quadrants.  Labs Na 131, K 3.3, Cl 93, CO2 16, AG 22, BUN 40, Cr 1.4, Glu 288, BHB 3.3.  CT abd pos for diffuse small bowel distention, fluid-filled consistent with SBO and decompression of colon.  Patient started on DKA protocol.  NG tube placed with 100 cc light gastric fluid returned so far.  Transfer requested for surgical evaluation.  Transfer diagnosis DKA, SBO.  [facility] (Dr. Al) has agreed to consult on patient.      Instructions      [facility]  Admit to Hospital Medicine  Upon patient arrival to floor, please contact Hospital Medicine on call.

## 2022-05-25 ENCOUNTER — CLINICAL SUPPORT (OUTPATIENT)
Dept: SMOKING CESSATION | Facility: CLINIC | Age: 57
End: 2022-05-25
Payer: COMMERCIAL

## 2022-05-25 DIAGNOSIS — F17.200 NICOTINE DEPENDENCE: Primary | ICD-10-CM

## 2022-05-25 LAB
ANION GAP SERPL CALC-SCNC: 12 MMOL/L (ref 8–16)
ANION GAP SERPL CALC-SCNC: 12 MMOL/L (ref 8–16)
ANION GAP SERPL CALC-SCNC: 13 MMOL/L (ref 8–16)
ANION GAP SERPL CALC-SCNC: 15 MMOL/L (ref 8–16)
ANION GAP SERPL CALC-SCNC: 8 MMOL/L (ref 8–16)
ANION GAP SERPL CALC-SCNC: 8 MMOL/L (ref 8–16)
BASOPHILS # BLD AUTO: ABNORMAL K/UL (ref 0–0.2)
BASOPHILS NFR BLD: 0 % (ref 0–1.9)
BUN SERPL-MCNC: 21 MG/DL (ref 6–20)
BUN SERPL-MCNC: 22 MG/DL (ref 6–20)
BUN SERPL-MCNC: 24 MG/DL (ref 6–20)
BUN SERPL-MCNC: 24 MG/DL (ref 6–20)
BUN SERPL-MCNC: 28 MG/DL (ref 6–20)
BUN SERPL-MCNC: 34 MG/DL (ref 6–20)
CALCIUM SERPL-MCNC: 8.5 MG/DL (ref 8.7–10.5)
CALCIUM SERPL-MCNC: 8.6 MG/DL (ref 8.7–10.5)
CALCIUM SERPL-MCNC: 8.6 MG/DL (ref 8.7–10.5)
CALCIUM SERPL-MCNC: 8.7 MG/DL (ref 8.7–10.5)
CALCIUM SERPL-MCNC: 8.7 MG/DL (ref 8.7–10.5)
CALCIUM SERPL-MCNC: 8.8 MG/DL (ref 8.7–10.5)
CHLORIDE SERPL-SCNC: 100 MMOL/L (ref 95–110)
CHLORIDE SERPL-SCNC: 101 MMOL/L (ref 95–110)
CHLORIDE SERPL-SCNC: 98 MMOL/L (ref 95–110)
CO2 SERPL-SCNC: 17 MMOL/L (ref 23–29)
CO2 SERPL-SCNC: 18 MMOL/L (ref 23–29)
CO2 SERPL-SCNC: 19 MMOL/L (ref 23–29)
CO2 SERPL-SCNC: 22 MMOL/L (ref 23–29)
CO2 SERPL-SCNC: 22 MMOL/L (ref 23–29)
CO2 SERPL-SCNC: 23 MMOL/L (ref 23–29)
CREAT SERPL-MCNC: 0.7 MG/DL (ref 0.5–1.4)
CREAT SERPL-MCNC: 0.7 MG/DL (ref 0.5–1.4)
CREAT SERPL-MCNC: 0.8 MG/DL (ref 0.5–1.4)
DIFFERENTIAL METHOD: ABNORMAL
EOSINOPHIL # BLD AUTO: ABNORMAL K/UL (ref 0–0.5)
EOSINOPHIL NFR BLD: 5 % (ref 0–8)
ERYTHROCYTE [DISTWIDTH] IN BLOOD BY AUTOMATED COUNT: 12.6 % (ref 11.5–14.5)
EST. GFR  (AFRICAN AMERICAN): >60 ML/MIN/1.73 M^2
EST. GFR  (NON AFRICAN AMERICAN): >60 ML/MIN/1.73 M^2
ESTIMATED AVG GLUCOSE: 154 MG/DL (ref 68–131)
GLUCOSE SERPL-MCNC: 226 MG/DL (ref 70–110)
GLUCOSE SERPL-MCNC: 234 MG/DL (ref 70–110)
GLUCOSE SERPL-MCNC: 236 MG/DL (ref 70–110)
GLUCOSE SERPL-MCNC: 257 MG/DL (ref 70–110)
GLUCOSE SERPL-MCNC: 269 MG/DL (ref 70–110)
GLUCOSE SERPL-MCNC: 274 MG/DL (ref 70–110)
HBA1C MFR BLD: 7 % (ref 4–5.6)
HCT VFR BLD AUTO: 38.6 % (ref 37–48.5)
HGB BLD-MCNC: 13.2 G/DL (ref 12–16)
IMM GRANULOCYTES # BLD AUTO: ABNORMAL K/UL (ref 0–0.04)
IMM GRANULOCYTES NFR BLD AUTO: ABNORMAL % (ref 0–0.5)
LYMPHOCYTES # BLD AUTO: ABNORMAL K/UL (ref 1–4.8)
LYMPHOCYTES NFR BLD: 35 % (ref 18–48)
MCH RBC QN AUTO: 31.9 PG (ref 27–31)
MCHC RBC AUTO-ENTMCNC: 34.2 G/DL (ref 32–36)
MCV RBC AUTO: 93 FL (ref 82–98)
MONOCYTES # BLD AUTO: ABNORMAL K/UL (ref 0.3–1)
MONOCYTES NFR BLD: 5 % (ref 4–15)
NEUTROPHILS NFR BLD: 21 % (ref 38–73)
NEUTS BAND NFR BLD MANUAL: 34 %
NRBC BLD-RTO: 0 /100 WBC
PHOSPHATE SERPL-MCNC: 1.8 MG/DL (ref 2.7–4.5)
PLATELET # BLD AUTO: 285 K/UL (ref 150–450)
PLATELET BLD QL SMEAR: ABNORMAL
PMV BLD AUTO: 8.9 FL (ref 9.2–12.9)
POCT GLUCOSE: 204 MG/DL (ref 70–110)
POCT GLUCOSE: 210 MG/DL (ref 70–110)
POCT GLUCOSE: 226 MG/DL (ref 70–110)
POCT GLUCOSE: 233 MG/DL (ref 70–110)
POCT GLUCOSE: 237 MG/DL (ref 70–110)
POCT GLUCOSE: 243 MG/DL (ref 70–110)
POCT GLUCOSE: 243 MG/DL (ref 70–110)
POCT GLUCOSE: 251 MG/DL (ref 70–110)
POCT GLUCOSE: 253 MG/DL (ref 70–110)
POCT GLUCOSE: 256 MG/DL (ref 70–110)
POCT GLUCOSE: 258 MG/DL (ref 70–110)
POCT GLUCOSE: 270 MG/DL (ref 70–110)
POCT GLUCOSE: 275 MG/DL (ref 70–110)
POCT GLUCOSE: 283 MG/DL (ref 70–110)
POCT GLUCOSE: 293 MG/DL (ref 70–110)
POTASSIUM SERPL-SCNC: 2.9 MMOL/L (ref 3.5–5.1)
POTASSIUM SERPL-SCNC: 3.1 MMOL/L (ref 3.5–5.1)
POTASSIUM SERPL-SCNC: 3.2 MMOL/L (ref 3.5–5.1)
POTASSIUM SERPL-SCNC: 3.3 MMOL/L (ref 3.5–5.1)
RBC # BLD AUTO: 4.14 M/UL (ref 4–5.4)
SODIUM SERPL-SCNC: 130 MMOL/L (ref 136–145)
SODIUM SERPL-SCNC: 131 MMOL/L (ref 136–145)
SODIUM SERPL-SCNC: 132 MMOL/L (ref 136–145)
SODIUM SERPL-SCNC: 134 MMOL/L (ref 136–145)
WBC # BLD AUTO: 3.61 K/UL (ref 3.9–12.7)

## 2022-05-25 PROCEDURE — 25500020 PHARM REV CODE 255: Performed by: SURGERY

## 2022-05-25 PROCEDURE — 85027 COMPLETE CBC AUTOMATED: CPT | Performed by: HOSPITALIST

## 2022-05-25 PROCEDURE — 80048 BASIC METABOLIC PNL TOTAL CA: CPT | Performed by: HOSPITALIST

## 2022-05-25 PROCEDURE — 99222 1ST HOSP IP/OBS MODERATE 55: CPT | Mod: ,,, | Performed by: SURGERY

## 2022-05-25 PROCEDURE — 36415 COLL VENOUS BLD VENIPUNCTURE: CPT | Performed by: HOSPITALIST

## 2022-05-25 PROCEDURE — 94761 N-INVAS EAR/PLS OXIMETRY MLT: CPT

## 2022-05-25 PROCEDURE — 63600175 PHARM REV CODE 636 W HCPCS: Performed by: SURGERY

## 2022-05-25 PROCEDURE — 20000000 HC ICU ROOM

## 2022-05-25 PROCEDURE — 99407 BEHAV CHNG SMOKING > 10 MIN: CPT | Mod: S$GLB,,, | Performed by: HOSPITALIST

## 2022-05-25 PROCEDURE — 84100 ASSAY OF PHOSPHORUS: CPT | Performed by: HOSPITALIST

## 2022-05-25 PROCEDURE — 25000003 PHARM REV CODE 250: Performed by: HOSPITALIST

## 2022-05-25 PROCEDURE — 85007 BL SMEAR W/DIFF WBC COUNT: CPT | Performed by: HOSPITALIST

## 2022-05-25 PROCEDURE — S5010 5% DEXTROSE AND 0.45% SALINE: HCPCS | Performed by: HOSPITALIST

## 2022-05-25 PROCEDURE — 99222 PR INITIAL HOSPITAL CARE,LEVL II: ICD-10-PCS | Mod: ,,, | Performed by: SURGERY

## 2022-05-25 PROCEDURE — 63600175 PHARM REV CODE 636 W HCPCS: Performed by: HOSPITALIST

## 2022-05-25 PROCEDURE — 99407 PR TOBACCO USE CESSATION INTENSIVE >10 MINUTES: ICD-10-PCS | Mod: S$GLB,,, | Performed by: HOSPITALIST

## 2022-05-25 RX ADMIN — DIATRIZOATE MEGLUMINE AND DIATRIZOATE SODIUM 60 ML: 660; 100 LIQUID ORAL; RECTAL at 10:05

## 2022-05-25 RX ADMIN — DEXTROSE AND SODIUM CHLORIDE 125 ML/HR: 5; .45 INJECTION, SOLUTION INTRAVENOUS at 02:05

## 2022-05-25 RX ADMIN — HYDROMORPHONE HYDROCHLORIDE 0.5 MG: 1 INJECTION, SOLUTION INTRAMUSCULAR; INTRAVENOUS; SUBCUTANEOUS at 10:05

## 2022-05-25 RX ADMIN — HYDROMORPHONE HYDROCHLORIDE 0.5 MG: 1 INJECTION, SOLUTION INTRAMUSCULAR; INTRAVENOUS; SUBCUTANEOUS at 05:05

## 2022-05-25 RX ADMIN — POTASSIUM CHLORIDE 60 MEQ: 7.46 INJECTION, SOLUTION INTRAVENOUS at 02:05

## 2022-05-25 RX ADMIN — HYDROMORPHONE HYDROCHLORIDE 0.5 MG: 1 INJECTION, SOLUTION INTRAMUSCULAR; INTRAVENOUS; SUBCUTANEOUS at 01:05

## 2022-05-25 RX ADMIN — PROCHLORPERAZINE EDISYLATE 5 MG: 5 INJECTION INTRAMUSCULAR; INTRAVENOUS at 03:05

## 2022-05-25 RX ADMIN — PROCHLORPERAZINE EDISYLATE 5 MG: 5 INJECTION INTRAMUSCULAR; INTRAVENOUS at 08:05

## 2022-05-25 RX ADMIN — HYDROMORPHONE HYDROCHLORIDE 0.5 MG: 1 INJECTION, SOLUTION INTRAMUSCULAR; INTRAVENOUS; SUBCUTANEOUS at 09:05

## 2022-05-25 RX ADMIN — HYDROMORPHONE HYDROCHLORIDE 0.5 MG: 1 INJECTION, SOLUTION INTRAMUSCULAR; INTRAVENOUS; SUBCUTANEOUS at 04:05

## 2022-05-25 RX ADMIN — DEXTROSE AND SODIUM CHLORIDE 125 ML/HR: 5; .45 INJECTION, SOLUTION INTRAVENOUS at 04:05

## 2022-05-25 NOTE — SUBJECTIVE & OBJECTIVE
Past Medical History:   Diagnosis Date    Arthritis     Diabetes mellitus     Elevated cholesterol     Hypertension        Past Surgical History:   Procedure Laterality Date     SECTION      HIP SURGERY Right     HYSTERECTOMY      JOINT REPLACEMENT Bilateral     hip       Review of patient's allergies indicates:   Allergen Reactions    Morphine Rash       Current Facility-Administered Medications on File Prior to Encounter   Medication    [COMPLETED] 0.9 % NaCl with KCl 20 mEq infusion    [COMPLETED] dicyclomine injection 20 mg    [COMPLETED] HYDROmorphone injection 0.5 mg    [COMPLETED] HYDROmorphone injection 0.5 mg    [COMPLETED] promethazine (PHENERGAN) 6.25 mg in dextrose 5 % 50 mL IVPB    [COMPLETED] sodium chloride 0.9% bolus 1,000 mL    [DISCONTINUED] insulin regular 50 Units in sodium chloride 0.9% 50 mL infusion (conc: 1 unit/mL)    [DISCONTINUED] insulin regular in 0.9 % NaCl 100 unit/100 mL (1 unit/mL) infusion     Current Outpatient Medications on File Prior to Encounter   Medication Sig    amLODIPine (NORVASC) 10 MG tablet Take 10 mg by mouth once daily.    aspirin 81 MG Chew Take 81 mg by mouth once daily.    atorvastatin (LIPITOR) 20 MG tablet Take 20 mg by mouth once daily.    calcium carbonate (OS-BEN) 600 mg calcium (1,500 mg) Tab Take 600 mg by mouth once.    dicyclomine (BENTYL) 20 mg tablet Take 1 tablet (20 mg total) by mouth 2 (two) times daily.    lisinopril-hydrochlorothiazide (PRINZIDE,ZESTORETIC) 20-12.5 mg per tablet Take 1 tablet by mouth once daily.    metFORMIN (GLUCOPHAGE) 500 MG tablet Take 500 mg by mouth 2 (two) times daily with meals.    MULTIVIT-IRON-MIN-FOLIC ACID 3,500-18-0.4 UNIT-MG-MG ORAL CHEW Take 1 tablet by mouth once daily.    ondansetron (ZOFRAN-ODT) 4 MG TbDL Take 1 tablet (4 mg total) by mouth every 6 (six) hours as needed.    oxyCODONE-acetaminophen (PERCOCET) 5-325 mg per tablet Take 2 tablets by mouth every 6 (six) hours as needed for Pain.    potassium  chloride (MICRO-K) 10 MEQ CpSR Take 10 mEq by mouth once.     Family History    None       Tobacco Use    Smoking status: Current Every Day Smoker     Packs/day: 0.50     Years: 29.00     Pack years: 14.50     Types: Cigarettes    Smokeless tobacco: Current User   Substance and Sexual Activity    Alcohol use: Yes     Comment: social    Drug use: No    Sexual activity: Not on file     Review of Systems   Constitutional:  Negative for activity change and fever.   HENT:  Negative for ear discharge, facial swelling and sore throat.    Eyes:  Negative for photophobia, pain and visual disturbance.   Respiratory:  Negative for apnea, cough and shortness of breath.    Cardiovascular:  Negative for chest pain and leg swelling.   Gastrointestinal:  Positive for abdominal distention, abdominal pain, nausea and vomiting. Negative for blood in stool.   Endocrine: Negative for cold intolerance and heat intolerance.   Musculoskeletal:  Negative for back pain and gait problem.   Skin:  Negative for pallor and rash.   Neurological:  Negative for speech difficulty and headaches.   Psychiatric/Behavioral:  Negative for confusion, hallucinations and suicidal ideas.    All other systems reviewed and are negative.  Objective:     Vital Signs (Most Recent):    Vital Signs (24h Range):  Temp:  [98.1 °F (36.7 °C)-98.8 °F (37.1 °C)] 98.8 °F (37.1 °C)  Pulse:  [] 87  Resp:  [10-21] 18  SpO2:  [98 %-100 %] 99 %  BP: (105-125)/(62-85) 120/62        There is no height or weight on file to calculate BMI.    Physical Exam  Vitals and nursing note reviewed.   Constitutional:       Appearance: She is well-developed.   HENT:      Head: Normocephalic and atraumatic.   Eyes:      General: No scleral icterus.        Right eye: No discharge.         Left eye: No discharge.   Neck:      Vascular: No JVD.   Cardiovascular:      Rate and Rhythm: Normal rate and regular rhythm.      Heart sounds: No murmur heard.    No friction rub. No gallop.    Pulmonary:      Effort: Pulmonary effort is normal.      Breath sounds: Normal breath sounds. No wheezing or rales.   Abdominal:      General: There is distension.      Palpations: Abdomen is soft.      Tenderness: There is abdominal tenderness. There is guarding. There is no rebound.      Comments: Hypoactive bowel sounds   Musculoskeletal:         General: No tenderness.      Cervical back: Neck supple.   Lymphadenopathy:      Cervical: No cervical adenopathy.   Skin:     Findings: No erythema or rash.   Neurological:      Mental Status: She is alert and oriented to person, place, and time.      Cranial Nerves: No cranial nerve deficit.           Significant Labs: All pertinent labs within the past 24 hours have been reviewed.    Significant Imaging: I have reviewed all pertinent imaging results/findings within the past 24 hours.

## 2022-05-25 NOTE — H&P
[facility] Medicine  History & Physical    Patient Name: Brooks Browning  MRN: [id number]  Patient Class: IP- Inpatient  Admission Date: 2022  Attending Physician: Shelton Antoine MD  Primary Care Provider: Sofía Murcia MD         Patient information was obtained from patient, past medical records and ER records.     Subjective:     Principal Problem:DKA (diabetic ketoacidosis)    Chief Complaint: No chief complaint on file.       HPI: Patient is a 56-year-old  female with a past medical history significant for type 2 diabetes primarily dependent on metformin, hypertension, and multiple joint replacements who presents to the hospital with approximately 1 week of abdominal pain and vomiting.  Patient states that she initially started having abdominal pain approximately 6 days prior to admission with intermittent pain worsening to constant pain with bilious vomiting.  Patient denies any diarrhea or other GI complaints.  Denies any fever or other systemic symptoms.  Patient states that she has never had any episode like this previously.  She initially thought it was a stomach flu , but as it worsened, she presented to the emergency department.  At the outside emergency department, the patient was found to be in diabetic ketoacidosis with elevated beta hydroxybutyrate as well as metabolic acidosis and she was also found to have a secondary small-bowel obstruction.  Patient does have a history of  section in the past.  Patient was transferred from [facility] to [facility] for treatment of her various medical issues.      Past Medical History:   Diagnosis Date    Arthritis     Diabetes mellitus     Elevated cholesterol     Hypertension        Past Surgical History:   Procedure Laterality Date     SECTION      HIP SURGERY Right     HYSTERECTOMY      JOINT REPLACEMENT Bilateral     hip       Review of patient's allergies  indicates:   Allergen Reactions    Morphine Rash       Current Facility-Administered Medications on File Prior to Encounter   Medication    [COMPLETED] 0.9 % NaCl with KCl 20 mEq infusion    [COMPLETED] dicyclomine injection 20 mg    [COMPLETED] HYDROmorphone injection 0.5 mg    [COMPLETED] HYDROmorphone injection 0.5 mg    [COMPLETED] promethazine (PHENERGAN) 6.25 mg in dextrose 5 % 50 mL IVPB    [COMPLETED] sodium chloride 0.9% bolus 1,000 mL    [DISCONTINUED] insulin regular 50 Units in sodium chloride 0.9% 50 mL infusion (conc: 1 unit/mL)    [DISCONTINUED] insulin regular in 0.9 % NaCl 100 unit/100 mL (1 unit/mL) infusion     Current Outpatient Medications on File Prior to Encounter   Medication Sig    amLODIPine (NORVASC) 10 MG tablet Take 10 mg by mouth once daily.    aspirin 81 MG Chew Take 81 mg by mouth once daily.    atorvastatin (LIPITOR) 20 MG tablet Take 20 mg by mouth once daily.    calcium carbonate (OS-BEN) 600 mg calcium (1,500 mg) Tab Take 600 mg by mouth once.    dicyclomine (BENTYL) 20 mg tablet Take 1 tablet (20 mg total) by mouth 2 (two) times daily.    lisinopril-hydrochlorothiazide (PRINZIDE,ZESTORETIC) 20-12.5 mg per tablet Take 1 tablet by mouth once daily.    metFORMIN (GLUCOPHAGE) 500 MG tablet Take 500 mg by mouth 2 (two) times daily with meals.    MULTIVIT-IRON-MIN-FOLIC ACID 3,500-18-0.4 UNIT-MG-MG ORAL CHEW Take 1 tablet by mouth once daily.    ondansetron (ZOFRAN-ODT) 4 MG TbDL Take 1 tablet (4 mg total) by mouth every 6 (six) hours as needed.    oxyCODONE-acetaminophen (PERCOCET) 5-325 mg per tablet Take 2 tablets by mouth every 6 (six) hours as needed for Pain.    potassium chloride (MICRO-K) 10 MEQ CpSR Take 10 mEq by mouth once.     Family History    None       Tobacco Use    Smoking status: Current Every Day Smoker     Packs/day: 0.50     Years: 29.00     Pack years: 14.50     Types: Cigarettes    Smokeless tobacco: Current User   Substance and Sexual  Activity    Alcohol use: Yes     Comment: social    Drug use: No    Sexual activity: Not on file     Review of Systems   Constitutional:  Negative for activity change and fever.   HENT:  Negative for ear discharge, facial swelling and sore throat.    Eyes:  Negative for photophobia, pain and visual disturbance.   Respiratory:  Negative for apnea, cough and shortness of breath.    Cardiovascular:  Negative for chest pain and leg swelling.   Gastrointestinal:  Positive for abdominal distention, abdominal pain, nausea and vomiting. Negative for blood in stool.   Endocrine: Negative for cold intolerance and heat intolerance.   Musculoskeletal:  Negative for back pain and gait problem.   Skin:  Negative for pallor and rash.   Neurological:  Negative for speech difficulty and headaches.   Psychiatric/Behavioral:  Negative for confusion, hallucinations and suicidal ideas.    All other systems reviewed and are negative.  Objective:     Vital Signs (Most Recent):    Vital Signs (24h Range):  Temp:  [98.1 °F (36.7 °C)-98.8 °F (37.1 °C)] 98.8 °F (37.1 °C)  Pulse:  [] 87  Resp:  [10-21] 18  SpO2:  [98 %-100 %] 99 %  BP: (105-125)/(62-85) 120/62        There is no height or weight on file to calculate BMI.    Physical Exam  Vitals and nursing note reviewed.   Constitutional:       Appearance: She is well-developed.   HENT:      Head: Normocephalic and atraumatic.   Eyes:      General: No scleral icterus.        Right eye: No discharge.         Left eye: No discharge.   Neck:      Vascular: No JVD.   Cardiovascular:      Rate and Rhythm: Normal rate and regular rhythm.      Heart sounds: No murmur heard.    No friction rub. No gallop.   Pulmonary:      Effort: Pulmonary effort is normal.      Breath sounds: Normal breath sounds. No wheezing or rales.   Abdominal:      General: There is distension.      Palpations: Abdomen is soft.      Tenderness: There is abdominal tenderness. There is guarding. There is no rebound.       Comments: Hypoactive bowel sounds   Musculoskeletal:         General: No tenderness.      Cervical back: Neck supple.   Lymphadenopathy:      Cervical: No cervical adenopathy.   Skin:     Findings: No erythema or rash.   Neurological:      Mental Status: She is alert and oriented to person, place, and time.      Cranial Nerves: No cranial nerve deficit.           Significant Labs: All pertinent labs within the past 24 hours have been reviewed.    Significant Imaging: I have reviewed all pertinent imaging results/findings within the past 24 hours.    Assessment/Plan:     * DKA (diabetic ketoacidosis)  Patient's FSGs are uncontrolled due to hyperglycemia on current medication regimen.  Last A1c reviewed- No results found for: LABA1C, HGBA1C  Most recent fingerstick glucose reviewed- Recent Labs   Lab 05/24/22  1348 05/24/22  1511 05/24/22  1637 05/24/22  1940   POCTGLUCOSE 314* 202* 133* 122*     Maintain anti-hyperglycemic dose as follows-   Antihyperglycemics (From admission, onward)            Start     Stop Route Frequency Ordered    05/24/22 2015  insulin regular 1 Units/mL in sodium chloride 0.9% 100 mL infusion        Question Answer Comment   Insulin Rate Adjustment (DO NOT MODIFY ANSWER) \\[email or web address]    Enter initial dose from Infusion Protocol Chart (Units/hr): 1        -- IV Continuous 05/24/22 1912        Hold Oral hypoglycemics while patient is in the hospital.  Continue q.4 electrolytes and q.1 hour POCT glucose and monitor closely in ICU.  Add dextrose containing IV fluids.  Wean off of drip once patient is taking enteral intake reliably.        Hypokalemia  Patient has hypokalemia which is currently uncontrolled. Last electrolytes reviewed- Recent Labs   Lab 05/24/22  1035 05/24/22  1943   K 3.3* 3.0*   . Will replace potassium and monitor electrolytes closely.         Small bowel obstruction  Etiology likely related to adhesions secondary  to previous surgery.  Continue NG tube placement.  General surgery consult in place.  P.r.n. medication for pain and nausea.        VTE Risk Mitigation (From admission, onward)         Ordered     Place sequential compression device  Until discontinued         05/24/22 1912     IP VTE LOW RISK PATIENT  Once         05/24/22 1912              Critical care time spent on the evaluation and treatment of severe organ dysfunction, review of pertinent labs and imaging studies, discussions with consulting providers and discussions with patient/family: 48 minutes.     Shelton Antoine MD  Department of Hospital Medicine   [facility]

## 2022-05-25 NOTE — CONSULTS
"  [facility]  Adult Nutrition  Consult Note    SUMMARY   Intervention: carbohydrate modified diet       Recommendations  Recommendation/Intervention:   1.) When appropriate per MD, recommend 1800 diabetic diet   2.) Parenteral Nutrition (if diet unable to advance) : clinimix 4.25%AA/D5 @ 75 mls/hr continuous + 20% lipids daily (250mls) providing 1112 kcals/day, 77 g protein/day, and 1800 mls fluid/day.   3.) RD to f/u with Carb counting education  Goals: 1.) diet will advance within 48 hrs  Nutrition Goal Status: new  Communication of RD Recs: other (comment) (POC)    1. Small bowel obstruction    2. DKA (diabetic ketoacidosis)    3. Diabetic ketoacidosis without coma associated with other specified diabetes mellitus      Past Medical History:   Diagnosis Date    Arthritis     Diabetes mellitus     Elevated cholesterol     Hypertension          Assessment and Plan  Nutrition Problem  Inadequate energy intake    Related to (etiology):   DKA    Signs and Symptoms (as evidenced by):   NPO x1 day    Interventions/Recommendations (treatment strategy):  When appropriate per MD, recommend diabetic diet   PPN if needed for SBO    Nutrition Diagnosis Status:   New        Malnutrition Assessment  Body mass index is 23.57 kg/m².  NPO x1 day    Reason for Assessment  Reason For Assessment: consult  General Information Comments: admits with DKA. RD consulted for carb counting education. Remains NPO, gen surgery consulted for SBO. Ng in place w/ min output. Will f/u with education once diet advanced.    Nutrition Risk Screen  Nutrition Risk Screen: no indicators present    Nutrition/Diet History  Food Allergies: NKFA  Factors Affecting Nutritional Intake: NPO    Anthropometrics  Temp: 98 °F (36.7 °C)  Height: 5' 8"  Height (inches): 68 in  Weight Method: Bed Scale  Weight: 70.3 kg (154 lb 15.7 oz)  Weight (lb): 154.98 lb  Ideal Body Weight (IBW), Female: 140 lb  % Ideal Body Weight, Female (lb): 110.7 " %  BMI (Calculated): 23.6  BMI Grade: 18.5-24.9 - normal  Usual Body Weight (UBW), k kg  % Usual Body Weight: 100.64  % Weight Change From Usual Weight: 0.43 %       Lab/Procedures/Meds  Pertinent Labs Reviewed: reviewed  BMP  Lab Results   Component Value Date     (L) 2022    K 3.3 (L) 2022     2022    CO2 22 (L) 2022    BUN 24 (H) 2022    CREATININE 0.7 2022    CALCIUM 8.5 (L) 2022    ANIONGAP 8 2022    ESTGFRAFRICA >60 2022    EGFRNONAA >60 2022     Lab Results   Component Value Date    ALBUMIN 3.2 (L) 2022     Lab Results   Component Value Date    CALCIUM 8.5 (L) 2022    PHOS 1.8 (L) 2022     Recent Labs   Lab 22  1214   POCTGLUCOSE 256*       Pertinent Medications Reviewed: reviewed  Scheduled Meds:  Continuous Infusions:   dextrose 5 % and 0.45 % NaCl 125 mL/hr (22 0400)    insulin regular 1 units/mL infusion orderable (DKA) 1.4 Units/hr (22 1217)         Estimated/Assessed Needs  Weight Used For Calorie Calculations: 70.3 kg (154 lb 15.7 oz)  Energy Calorie Requirements (kcal): 1800 kcals  Energy Need Method: Titus-[place] Jessica (x1.3af)  Protein Requirements: 70-85g  Weight Used For Protein Calculations: 70.3 kg (154 lb 15.7 oz)  Estimated Fluid Requirement Method: RDA Method  RDA Method (mL): 1800      Nutrition Prescription Ordered  Current Diet Order: NPO    Evaluation of Received Nutrient/Fluid Intake  Parenteral Calories (kcal): 510 (D5 @ 125 mls)  IV Fluid (mL): 3000  I/O: +1639.3  Tolerance:  (NPO)  % Intake of Estimated Energy Needs: 0 - 25 %  % Meal Intake: NPO    Nutrition Risk  Level of Risk/Frequency of Follow-up:  (x2 weekly)       Monitor and Evaluation  Food and Nutrient Intake: energy intake, food and beverage intake  Food and Nutrient Adminstration: diet order  Anthropometric Measurements: weight, weight change, body mass index  Biochemical Data, Medical Tests and Procedures:  electrolyte and renal panel, glucose/endocrine profile, lipid profile  Nutrition-Focused Physical Findings: overall appearance       Nutrition Follow-Up  RD Follow-up?: Yes

## 2022-05-25 NOTE — CONSULTS
GENERAL SURGERY  INPATIENT CONSULT    REASON FOR CONSULT:  Small bowel obstruction    HPI: Brooks Browning is a 56 y.o. female transferred from outside facility for small-bowel obstruction and DKA.  Admitted to the ICU on insulin drip.  NG tube placed previously at outside hospital.  Patient reports 1 week prior to most recent presentation she went to the emergency room for abdominal pain nausea vomiting.  She was given fluids and discharged home.  Since that time she has had minimal bowel function.  She had difficulty tolerating solid food and was only attempting to drink liquids to stay hydrated.  Re-presented to the emergency room with ongoing symptoms of abdominal discomfort, nausea, vomiting and bloating.  Found to have an on gap of 22 and CO2 of 16 concerning for DKA.  Started on DKA protocol.  CT scan showed concerns for small-bowel obstruction though no obvious transition point was identified.  NG tube was placed.    Seen and examined this morning.  States she passed a small amount of flatus.  A abdomen feels somewhat less distended since NG tube has been placed.  Reports previous abdominal surgeries including partial hysterectomy and .  No previous similar symptoms.    I have reviewed the patient's chart including prior progress notes, procedures and testing.     ROS:   Review of Systems   Constitutional: Positive for activity change, appetite change, fatigue and fever.   Gastrointestinal: Positive for abdominal distention, abdominal pain, nausea and vomiting.   Genitourinary: Negative for difficulty urinating and dysuria.   Skin: Negative for color change.   Neurological: Positive for weakness. Negative for dizziness and light-headedness.       PROBLEM LIST:  Patient Active Problem List   Diagnosis    Degenerative arthritis of left knee    Pain in joint of left knee    DKA (diabetic ketoacidosis)    Small bowel obstruction    Hypokalemia         HISTORY  Past Medical History:   Diagnosis Date     Arthritis     Diabetes mellitus     Elevated cholesterol     Hypertension        Past Surgical History:   Procedure Laterality Date     SECTION      HIP SURGERY Right     HYSTERECTOMY      JOINT REPLACEMENT Bilateral     hip       Social History     Tobacco Use    Smoking status: Current Every Day Smoker     Packs/day: 0.50     Years: 29.00     Pack years: 14.50     Types: Cigarettes    Smokeless tobacco: Current User   Substance Use Topics    Alcohol use: Yes     Comment: social    Drug use: No       History reviewed. No pertinent family history.      MEDS:  Current Facility-Administered Medications on File Prior to Encounter   Medication Dose Route Frequency Provider Last Rate Last Admin    [COMPLETED] 0.9 % NaCl with KCl 20 mEq infusion   Intravenous ED 1 Time Sushma Jaimes NP   Stopped at 22 1300    [COMPLETED] dicyclomine injection 20 mg  20 mg Intramuscular ED 1 Time Sushma Jaimes NP   20 mg at 22 1108    [COMPLETED] HYDROmorphone injection 0.5 mg  0.5 mg Intravenous ED 1 Time Micah Doran MD   0.5 mg at 22 1345    [COMPLETED] HYDROmorphone injection 0.5 mg  0.5 mg Intravenous ED 1 Time Micah Doran MD   0.5 mg at 22 1714    [COMPLETED] promethazine (PHENERGAN) 6.25 mg in dextrose 5 % 50 mL IVPB  6.25 mg Intravenous ED 1 Time Sushma Jaimes NP   Stopped at 22 1124    [COMPLETED] sodium chloride 0.9% bolus 1,000 mL  1,000 mL Intravenous ED 1 Time Sushma Jaimes NP   Stopped at 22 1203    [DISCONTINUED] insulin regular 50 Units in sodium chloride 0.9% 50 mL infusion (conc: 1 unit/mL)  0.05 Units/kg/hr Intravenous Continuous Micah Doran MD        [DISCONTINUED] insulin regular in 0.9 % NaCl 100 unit/100 mL (1 unit/mL) infusion  3.515 Units/hr Intravenous Continuous Micah Doran MD 3.5 mL/hr at 22 1403 3.515 Units/hr at 22 1403     Current Outpatient Medications on File Prior to  Encounter   Medication Sig Dispense Refill    amLODIPine (NORVASC) 10 MG tablet Take 10 mg by mouth once daily.      aspirin 81 MG Chew Take 81 mg by mouth once daily.      atorvastatin (LIPITOR) 20 MG tablet Take 20 mg by mouth once daily.      calcium carbonate (OS-BEN) 600 mg calcium (1,500 mg) Tab Take 600 mg by mouth once.      dicyclomine (BENTYL) 20 mg tablet Take 1 tablet (20 mg total) by mouth 2 (two) times daily. 20 tablet 0    lisinopril-hydrochlorothiazide (PRINZIDE,ZESTORETIC) 20-12.5 mg per tablet Take 1 tablet by mouth once daily.      metFORMIN (GLUCOPHAGE) 500 MG tablet Take 500 mg by mouth 2 (two) times daily with meals.      MULTIVIT-IRON-MIN-FOLIC ACID 3,500-18-0.4 UNIT-MG-MG ORAL CHEW Take 1 tablet by mouth once daily.      ondansetron (ZOFRAN-ODT) 4 MG TbDL Take 1 tablet (4 mg total) by mouth every 6 (six) hours as needed. 15 tablet 0    oxyCODONE-acetaminophen (PERCOCET) 5-325 mg per tablet Take 2 tablets by mouth every 6 (six) hours as needed for Pain. 60 tablet 0    potassium chloride (MICRO-K) 10 MEQ CpSR Take 10 mEq by mouth once.         ALLERGIES:  Review of patient's allergies indicates:   Allergen Reactions    Morphine Rash         VITALS:  Temp:  [98 °F (36.7 °C)-98.8 °F (37.1 °C)] 98.4 °F (36.9 °C)  Pulse:  [] 90  Resp:  [10-24] 16  SpO2:  [89 %-100 %] 94 %  BP: (102-125)/(62-85) 107/71    I/O last 3 completed shifts:  In: 1639.3 [I.V.:1139.3; IV Piggyback:500]  Out: -       PHYSICAL EXAM:  Physical Exam  Vitals reviewed.   Constitutional:       General: She is not in acute distress.     Appearance: Normal appearance. She is well-developed.   HENT:      Head: Normocephalic and atraumatic.      Nose: Nose normal.      Comments: NG tube in place with minimal output  Eyes:      General: No scleral icterus.     Conjunctiva/sclera: Conjunctivae normal.   Neck:      Trachea: No tracheal tenderness or tracheal deviation.   Cardiovascular:      Rate and Rhythm: Normal rate  and regular rhythm.      Pulses: Normal pulses.   Pulmonary:      Effort: Pulmonary effort is normal. No accessory muscle usage or respiratory distress.      Breath sounds: Normal breath sounds.   Abdominal:      General: There is no distension (No significant distension or tenderness at this time).      Palpations: Abdomen is soft.      Tenderness: There is no abdominal tenderness.   Musculoskeletal:         General: No swelling or tenderness. Normal range of motion.      Cervical back: Normal range of motion and neck supple. No rigidity.   Skin:     General: Skin is warm and dry.      Coloration: Skin is not jaundiced.      Findings: No erythema.   Neurological:      General: No focal deficit present.      Mental Status: She is alert and oriented to person, place, and time.      Motor: No weakness or abnormal muscle tone.   Psychiatric:         Mood and Affect: Mood normal.         Behavior: Behavior normal.         Thought Content: Thought content normal.         Judgment: Judgment normal.           LABS:  Lab Results   Component Value Date    WBC 3.61 (L) 05/25/2022    RBC 4.14 05/25/2022    HGB 13.2 05/25/2022    HCT 38.6 05/25/2022    HCT 47 05/24/2022     05/25/2022     Lab Results   Component Value Date     (H) 05/25/2022     (L) 05/25/2022    K 3.3 (L) 05/25/2022     05/25/2022    CO2 23 05/25/2022    BUN 28 (H) 05/25/2022    CREATININE 0.8 05/25/2022    CALCIUM 8.7 05/25/2022     Lab Results   Component Value Date    ALT 11 05/24/2022    AST 13 05/24/2022    ALKPHOS 55 05/24/2022    BILITOT 0.8 05/24/2022     Lab Results   Component Value Date    PHOS 1.8 (L) 05/25/2022       STUDIES:  Images and reports were personally reviewed.    CT A/P  FINDINGS:  Heart: Normal in size. No pericardial effusion.     Lung Bases: Well aerated, without consolidation or pleural fluid.     Liver: Normal in size and attenuation, with no focal hepatic lesions.     Gallbladder: No calcified  gallstones.     Bile Ducts: No evidence of dilated ducts.     Pancreas: No mass or peripancreatic fat stranding.     Spleen: Unremarkable.     Adrenals: Unremarkable.     Kidneys/ Ureters: Unremarkable.     Bladder: No evidence of wall thickening.     Reproductive organs: Unremarkable.     GI Tract/Mesentery: Dilated loops of small bowel, up to 4 cm transversely with decompression of the colon.  Small bowel obstruction must be suspect.     Peritoneal Space: No ascites. No free air.     Retroperitoneum: No significant adenopathy.     Abdominal wall: Unremarkable.     Vasculature: Atherosclerosis of aorta with RIGHT renal stents. 4     Bones: No acute fracture.  Bilateral hip arthroplasty.     Impression:     Diffuse small bowel distension, fluid-filled, consistent with small-bowel obstruction.  Decompression of colon.         ASSESSMENT & PLAN:  56 y.o. female with DKA, SBO  - DKA appears to be resolved, remaining treatment per primary  - appears to have small-bowel obstruction though did pass a small amount of flatus today and NG tube with minimal output  - will obtain Gastrografin study to evaluate transit of contrast through the small bowel and hopefully into the colon  - plan discussed with the patient who is in agreement  - orders for 60 cc of Gastrografin to be mixed with 40 cc of water and inserted down the NG tube, NG tube is to remain clamped for least 4 hours unless patient develops severe nausea and vomiting.  A series of abdominal x-rays will be obtained approximally 4-6 hours apart. \    Please call with any questions or concerns.    Juve Anne MD  General Surgery  [phone]'

## 2022-05-25 NOTE — PLAN OF CARE
Intervention: carbohydrate modified diet         Recommendations  Recommendation/Intervention:   1.) When appropriate per MD, recommend 1800 diabetic diet   2.) Parenteral Nutrition (if diet unable to advance) : clinimix 4.25%AA/D5 @ 75 mls/hr continuous + 20% lipids daily (250mls) providing 1112 kcals/day, 77 g protein/day, and 1800 mls fluid/day.   3.) RD to f/u with Carb counting education  Goals: 1.) diet will advance within 48 hrs  Nutrition Goal Status: new  Communication of RD Recs: other (comment) (POC)

## 2022-05-25 NOTE — ASSESSMENT & PLAN NOTE
Patient has hypokalemia which is currently uncontrolled. Last electrolytes reviewed- Recent Labs   Lab 05/24/22  1035 05/24/22  1943   K 3.3* 3.0*   . Will replace potassium and monitor electrolytes closely.

## 2022-05-25 NOTE — EICU
EICU BRIEF INITIAL EVALUATION:       HISTORY:      56-year-old woman transferred from [facility].  She was admitted with DKA and probable small bowel obstruction.  Transfer was necessary for surgical services  Glucose currently 122  History of DM 2, HTN    DVT prophylaxis  GI prophylaxis    /72, P 97, R 22, O2 sat 95. Qtc 567  Resting comfortably on room air with NG tube in place      CAMERA ASSESSMENT: Yes      TELEMETRY: Reviewed      NOTES: Reviewed     LABS: Reviewed      IMAGING: Personally reviewed.      DISCUSSED with bedside nurse     ASSESSMENT AND PLAN:       DKA-Improved but still low bicarb. Continue insulin drip as ordered.  Continue aggressive hydration for probable small bowel obstruction    SBO-CT consistent with SBO with markedly dilated small bowel with collapsed terminal ileum but with moderate amount of stool in cecum and rectum I could not see a clear transition point.  Continue NPO, NG tube to suction, hydration    Hypotension-BP currently on low side.  Would continue hydration and hold home antihypertensives.  Would treat p.r.n. for SBP greater than 160    Hypokalemia- would replace aggressively in face of SBO and elevated QTC    Elevated QTC-most likely due to hyperkalemia.  Monitor. Salvador daniels/deanna VIZCARRA. Manfred Way MD  eICU Attending  [phone]

## 2022-05-25 NOTE — ASSESSMENT & PLAN NOTE
Patient's FSGs are uncontrolled due to hyperglycemia on current medication regimen.  Last A1c reviewed- No results found for: LABA1C, HGBA1C  Most recent fingerstick glucose reviewed- Recent Labs   Lab 05/24/22  1348 05/24/22  1511 05/24/22  1637 05/24/22  1940   POCTGLUCOSE 314* 202* 133* 122*     Maintain anti-hyperglycemic dose as follows-   Antihyperglycemics (From admission, onward)            Start     Stop Route Frequency Ordered    05/24/22 2015  insulin regular 1 Units/mL in sodium chloride 0.9% 100 mL infusion        Question Answer Comment   Insulin Rate Adjustment (DO NOT MODIFY ANSWER) \\[employer]\epic\Images\Pharmacy\InsulinInfusions\InsulinDKA [id number].pdf    Enter initial dose from Infusion Protocol Chart (Units/hr): 1        -- IV Continuous 05/24/22 1912        Hold Oral hypoglycemics while patient is in the hospital.  Continue q.4 electrolytes and q.1 hour POCT glucose and monitor closely in ICU.  Add dextrose containing IV fluids.  Wean off of drip once patient is taking enteral intake reliably.

## 2022-05-25 NOTE — PLAN OF CARE
05/24/22 2059   Patient Assessment/Suction   Level of Consciousness (AVPU) alert   Respiratory Effort Normal;Unlabored   Expansion/Accessory Muscles/Retractions no retractions;no use of accessory muscles   Rhythm/Pattern, Respiratory depth regular;pattern regular;unlabored   Cough Frequency no cough   PRE-TX-O2   O2 Device (Oxygen Therapy) room air   SpO2 95 %   Pulse Oximetry Type Continuous   $ Pulse Oximetry - Multiple Charge Pulse Oximetry - Multiple   Pulse 94   Resp (!) 24

## 2022-05-25 NOTE — ASSESSMENT & PLAN NOTE
Etiology likely related to adhesions secondary to previous surgery.  Continue NG tube placement.  General surgery consult in place.  P.r.n. medication for pain and nausea.

## 2022-05-25 NOTE — NURSING
Pt's last 2 BG checks, 256 and 253-taken 2 hours apart. Per Dr. Antoine pt needs to remain on insulin gtt until she is tolerating food. Last 2 BG checks, less than 300, orders changed to Q4H accu checks while on insulin gtt. Will cont to monitor.

## 2022-05-25 NOTE — CARE UPDATE
05/25/22 0829   Patient Assessment/Suction   Level of Consciousness (AVPU) alert   Respiratory Effort Unlabored   Rhythm/Pattern, Respiratory unlabored   PRE-TX-O2   O2 Device (Oxygen Therapy) room air   Pulse Oximetry Type Continuous   $ Pulse Oximetry - Multiple Charge Pulse Oximetry - Multiple

## 2022-05-25 NOTE — PLAN OF CARE
[facility]  Initial Discharge Assessment       Primary Care Provider: Sofía Murcia MD    Admission Diagnosis: DKA (diabetic ketoacidosis) [E11.10]    Admission Date: 5/24/2022  Expected Discharge Date:     Discharge Barriers Identified: None    Payor: MEDICAID / Plan: [employer] [place]UE [place] / Product Type: Managed Medicaid /     Extended Emergency Contact Information  Primary Emergency Contact: Tru Browning   [facility]  Home Phone: [phone]  Relation: Son  Secondary Emergency Contact: Karley Browning   [facility]  Home Phone: [phone]  Relation: Daughter    Discharge Plan A: Home  Discharge Plan B: Home with family      [facility] [address]  [address] [place] [address]  Phone: [phone] Fax: [phone]    SW met with patient at bedside to complete discharge planning assessment.  Patient alert and oriented xs 4.  Patient verified all demographic information on facesheet is correct.  Patient verified PCP is Dr. Murcia.  Patient verified primary health insurance is [employer] ([place] Medicaid[place]  Patient with NO home health or DME.  Patient with NO POA or Living Will.  Patient not on dialysis or medication coumadin.  Patient with no 30 day admission.  Patient with no financial issues at this time.  Patient family will provide transportation upon discharge from facility.  Patient independent with ADLs, live with adult son, drives self.      Initial Assessment (most recent)     Adult Discharge Assessment - 05/25/22 1519        Discharge Assessment    Assessment Type Discharge Planning Assessment     Confirmed/corrected address, phone number and insurance Yes     Confirmed Demographics Correct on Facesheet     Source of Information patient     Communicated STEFANIE with patient/caregiver Date not available/Unable to determine     Lives With child(trent), adult     Facility Arrived From: home     Do  you expect to return to your current living situation? Yes     Do you have help at home or someone to help you manage your care at home? Yes     Who are your caregiver(s) and their phone number(s)? son     Prior to hospitilization cognitive status: Alert/Oriented     Current cognitive status: Alert/Oriented     Walking or Climbing Stairs Difficulty none     Dressing/Bathing Difficulty none     Equipment Currently Used at Home none     Readmission within 30 days? No     Patient currently being followed by outpatient case management? No     Do you currently have service(s) that help you manage your care at home? No     Do you take prescription medications? Yes     Do you have prescription coverage? Yes     Do you have any problems affording any of your prescribed medications? No     Is the patient taking medications as prescribed? yes     Who is going to help you get home at discharge? son     How do you get to doctors appointments? car, drives self     Are you on dialysis? No     Do you take coumadin? No     Discharge Plan A Home     Discharge Plan B Home with family     DME Needed Upon Discharge  none     Discharge Plan discussed with: Patient     Discharge Barriers Identified None

## 2022-05-25 NOTE — EICU
eICU Physician Virtual/Remote Brief Evaluation Note      Telephone call RN  Patient complaining of pain and has morphine ordered  Has morphine allergy-Rash  Received hydromorphone at [facility] is a  Chart reviewed  Received hydromorphone 0.5 x 2  Morphine discontinued, hydromorphone 0.5 mg IV      LEEANN Way MD  eICU Attending  [phone]    This report has been created through the use of [employer] dictation software. Typographical and content errors may occur with this process. While efforts are made to detect and correct such errors, in some cases errors will persist. For this reason, wording in this document should be considered in the proper context and not strictly verbatim

## 2022-05-25 NOTE — PLAN OF CARE
Pt remains in ICU. VSS throughout day. Gastrograffin given this am, x-rays ordered. Remains on insulin gtt d/t NPO diet. Accuchecks ordered Q4h. D5 1/2 NS @ 125 mL continued. NGT remains clamped, Compazine given for intermittent nausea. Abd pain controlled with PRN dilaudid. Daughter @ bedside throughout day. Pt up to bedside commode with standby assist. UOP adequate. Safety checks done. Will cont to monitor and report off to oncoming nurse to assume care.     Problem: Adult Inpatient Plan of Care  Goal: Plan of Care Review  Outcome: Ongoing, Progressing     Problem: Diabetic Ketoacidosis  Goal: Fluid and Electrolyte Balance with Absence of Ketosis  Outcome: Ongoing, Progressing     Problem: Diabetes Comorbidity  Goal: Blood Glucose Level Within Targeted Range  Outcome: Ongoing, Progressing

## 2022-05-25 NOTE — PROGRESS NOTES
Pt is a current daily smoker and was educated on smoking cessation.  Pt states she is ready to quit and agreed to enroll in the [facility] smoking cessation program.  Information about the program also given to Pt.

## 2022-05-26 LAB
ANION GAP SERPL CALC-SCNC: 11 MMOL/L (ref 8–16)
ANION GAP SERPL CALC-SCNC: 12 MMOL/L (ref 8–16)
ANION GAP SERPL CALC-SCNC: 12 MMOL/L (ref 8–16)
ANION GAP SERPL CALC-SCNC: 15 MMOL/L (ref 8–16)
ANION GAP SERPL CALC-SCNC: 16 MMOL/L (ref 8–16)
BASOPHILS # BLD AUTO: ABNORMAL K/UL (ref 0–0.2)
BASOPHILS NFR BLD: 0 % (ref 0–1.9)
BUN SERPL-MCNC: 22 MG/DL (ref 6–20)
BUN SERPL-MCNC: 22 MG/DL (ref 6–20)
BUN SERPL-MCNC: 25 MG/DL (ref 6–20)
BUN SERPL-MCNC: 26 MG/DL (ref 6–20)
BUN SERPL-MCNC: 26 MG/DL (ref 6–20)
CALCIUM SERPL-MCNC: 7.5 MG/DL (ref 8.7–10.5)
CALCIUM SERPL-MCNC: 8 MG/DL (ref 8.7–10.5)
CALCIUM SERPL-MCNC: 8.6 MG/DL (ref 8.7–10.5)
CALCIUM SERPL-MCNC: 8.7 MG/DL (ref 8.7–10.5)
CALCIUM SERPL-MCNC: 8.8 MG/DL (ref 8.7–10.5)
CHLORIDE SERPL-SCNC: 100 MMOL/L (ref 95–110)
CHLORIDE SERPL-SCNC: 101 MMOL/L (ref 95–110)
CHLORIDE SERPL-SCNC: 102 MMOL/L (ref 95–110)
CHLORIDE SERPL-SCNC: 99 MMOL/L (ref 95–110)
CHLORIDE SERPL-SCNC: 99 MMOL/L (ref 95–110)
CO2 SERPL-SCNC: 14 MMOL/L (ref 23–29)
CO2 SERPL-SCNC: 16 MMOL/L (ref 23–29)
CO2 SERPL-SCNC: 18 MMOL/L (ref 23–29)
CO2 SERPL-SCNC: 18 MMOL/L (ref 23–29)
CO2 SERPL-SCNC: 19 MMOL/L (ref 23–29)
CREAT SERPL-MCNC: 0.8 MG/DL (ref 0.5–1.4)
CREAT SERPL-MCNC: 0.9 MG/DL (ref 0.5–1.4)
CREAT SERPL-MCNC: 1.2 MG/DL (ref 0.5–1.4)
DIFFERENTIAL METHOD: ABNORMAL
EOSINOPHIL # BLD AUTO: ABNORMAL K/UL (ref 0–0.5)
EOSINOPHIL NFR BLD: 0 % (ref 0–8)
ERYTHROCYTE [DISTWIDTH] IN BLOOD BY AUTOMATED COUNT: 12.6 % (ref 11.5–14.5)
EST. GFR  (AFRICAN AMERICAN): 59 ML/MIN/1.73 M^2
EST. GFR  (AFRICAN AMERICAN): >60 ML/MIN/1.73 M^2
EST. GFR  (AFRICAN AMERICAN): >60 ML/MIN/1.73 M^2
EST. GFR  (NON AFRICAN AMERICAN): 51 ML/MIN/1.73 M^2
EST. GFR  (NON AFRICAN AMERICAN): >60 ML/MIN/1.73 M^2
EST. GFR  (NON AFRICAN AMERICAN): >60 ML/MIN/1.73 M^2
GLUCOSE SERPL-MCNC: 100 MG/DL (ref 70–110)
GLUCOSE SERPL-MCNC: 161 MG/DL (ref 70–110)
GLUCOSE SERPL-MCNC: 209 MG/DL (ref 70–110)
GLUCOSE SERPL-MCNC: 228 MG/DL (ref 70–110)
GLUCOSE SERPL-MCNC: 287 MG/DL (ref 70–110)
GLUCOSE SERPL-MCNC: 296 MG/DL (ref 70–110)
HCT VFR BLD AUTO: 41.4 % (ref 37–48.5)
HGB BLD-MCNC: 14.2 G/DL (ref 12–16)
IMM GRANULOCYTES # BLD AUTO: ABNORMAL K/UL (ref 0–0.04)
IMM GRANULOCYTES NFR BLD AUTO: ABNORMAL % (ref 0–0.5)
LYMPHOCYTES # BLD AUTO: ABNORMAL K/UL (ref 1–4.8)
LYMPHOCYTES NFR BLD: 29 % (ref 18–48)
MCH RBC QN AUTO: 32.3 PG (ref 27–31)
MCHC RBC AUTO-ENTMCNC: 34.3 G/DL (ref 32–36)
MCV RBC AUTO: 94 FL (ref 82–98)
MONOCYTES # BLD AUTO: ABNORMAL K/UL (ref 0.3–1)
MONOCYTES NFR BLD: 11 % (ref 4–15)
NEUTROPHILS NFR BLD: 41 % (ref 38–73)
NEUTS BAND NFR BLD MANUAL: 19 %
NRBC BLD-RTO: 0 /100 WBC
PHOSPHATE SERPL-MCNC: 1.4 MG/DL (ref 2.7–4.5)
PLATELET # BLD AUTO: 245 K/UL (ref 150–450)
PLATELET BLD QL SMEAR: ABNORMAL
PMV BLD AUTO: 9.6 FL (ref 9.2–12.9)
POCT GLUCOSE: 100 MG/DL (ref 70–110)
POCT GLUCOSE: 210 MG/DL (ref 70–110)
POCT GLUCOSE: 230 MG/DL (ref 70–110)
POCT GLUCOSE: 237 MG/DL (ref 70–110)
POCT GLUCOSE: 271 MG/DL (ref 70–110)
POCT GLUCOSE: 325 MG/DL (ref 70–110)
POCT GLUCOSE: 356 MG/DL (ref 70–110)
POCT GLUCOSE: 382 MG/DL (ref 70–110)
POTASSIUM SERPL-SCNC: 2.9 MMOL/L (ref 3.5–5.1)
POTASSIUM SERPL-SCNC: 2.9 MMOL/L (ref 3.5–5.1)
POTASSIUM SERPL-SCNC: 3 MMOL/L (ref 3.5–5.1)
POTASSIUM SERPL-SCNC: 3.3 MMOL/L (ref 3.5–5.1)
POTASSIUM SERPL-SCNC: 3.5 MMOL/L (ref 3.5–5.1)
RBC # BLD AUTO: 4.4 M/UL (ref 4–5.4)
SODIUM SERPL-SCNC: 129 MMOL/L (ref 136–145)
SODIUM SERPL-SCNC: 129 MMOL/L (ref 136–145)
SODIUM SERPL-SCNC: 130 MMOL/L (ref 136–145)
SODIUM SERPL-SCNC: 131 MMOL/L (ref 136–145)
SODIUM SERPL-SCNC: 133 MMOL/L (ref 136–145)
WBC # BLD AUTO: 3.5 K/UL (ref 3.9–12.7)

## 2022-05-26 PROCEDURE — 85027 COMPLETE CBC AUTOMATED: CPT | Performed by: HOSPITALIST

## 2022-05-26 PROCEDURE — C1751 CATH, INF, PER/CENT/MIDLINE: HCPCS

## 2022-05-26 PROCEDURE — 99232 SBSQ HOSP IP/OBS MODERATE 35: CPT | Mod: ,,, | Performed by: SURGERY

## 2022-05-26 PROCEDURE — 25000003 PHARM REV CODE 250: Performed by: HOSPITALIST

## 2022-05-26 PROCEDURE — 63600175 PHARM REV CODE 636 W HCPCS: Performed by: SURGERY

## 2022-05-26 PROCEDURE — C9113 INJ PANTOPRAZOLE SODIUM, VIA: HCPCS | Performed by: NURSE PRACTITIONER

## 2022-05-26 PROCEDURE — 36410 VNPNXR 3YR/> PHY/QHP DX/THER: CPT

## 2022-05-26 PROCEDURE — 36415 COLL VENOUS BLD VENIPUNCTURE: CPT | Performed by: HOSPITALIST

## 2022-05-26 PROCEDURE — 99232 PR SUBSEQUENT HOSPITAL CARE,LEVL II: ICD-10-PCS | Mod: ,,, | Performed by: SURGERY

## 2022-05-26 PROCEDURE — 85007 BL SMEAR W/DIFF WBC COUNT: CPT | Performed by: HOSPITALIST

## 2022-05-26 PROCEDURE — 20000000 HC ICU ROOM

## 2022-05-26 PROCEDURE — 63600175 PHARM REV CODE 636 W HCPCS: Performed by: HOSPITALIST

## 2022-05-26 PROCEDURE — 84100 ASSAY OF PHOSPHORUS: CPT | Performed by: HOSPITALIST

## 2022-05-26 PROCEDURE — S5010 5% DEXTROSE AND 0.45% SALINE: HCPCS | Performed by: HOSPITALIST

## 2022-05-26 PROCEDURE — 94761 N-INVAS EAR/PLS OXIMETRY MLT: CPT

## 2022-05-26 PROCEDURE — 80048 BASIC METABOLIC PNL TOTAL CA: CPT | Performed by: HOSPITALIST

## 2022-05-26 PROCEDURE — 76937 US GUIDE VASCULAR ACCESS: CPT

## 2022-05-26 PROCEDURE — 63600175 PHARM REV CODE 636 W HCPCS: Performed by: NURSE PRACTITIONER

## 2022-05-26 PROCEDURE — 80048 BASIC METABOLIC PNL TOTAL CA: CPT | Mod: 91 | Performed by: HOSPITALIST

## 2022-05-26 RX ORDER — MAGNESIUM SULFATE HEPTAHYDRATE 40 MG/ML
4 INJECTION, SOLUTION INTRAVENOUS
Status: DISCONTINUED | OUTPATIENT
Start: 2022-05-26 | End: 2022-05-27

## 2022-05-26 RX ORDER — CALCIUM GLUCONATE 20 MG/ML
1 INJECTION, SOLUTION INTRAVENOUS
Status: DISCONTINUED | OUTPATIENT
Start: 2022-05-26 | End: 2022-05-27

## 2022-05-26 RX ORDER — POTASSIUM CHLORIDE 7.45 MG/ML
60 INJECTION INTRAVENOUS
Status: DISCONTINUED | OUTPATIENT
Start: 2022-05-26 | End: 2022-05-27

## 2022-05-26 RX ORDER — CALCIUM GLUCONATE 20 MG/ML
2 INJECTION, SOLUTION INTRAVENOUS
Status: DISCONTINUED | OUTPATIENT
Start: 2022-05-26 | End: 2022-05-27

## 2022-05-26 RX ORDER — MAGNESIUM SULFATE HEPTAHYDRATE 40 MG/ML
2 INJECTION, SOLUTION INTRAVENOUS
Status: DISCONTINUED | OUTPATIENT
Start: 2022-05-26 | End: 2022-05-27

## 2022-05-26 RX ORDER — PANTOPRAZOLE SODIUM 40 MG/10ML
40 INJECTION, POWDER, LYOPHILIZED, FOR SOLUTION INTRAVENOUS ONCE
Status: COMPLETED | OUTPATIENT
Start: 2022-05-26 | End: 2022-05-26

## 2022-05-26 RX ORDER — MUPIROCIN 20 MG/G
OINTMENT TOPICAL 2 TIMES DAILY
Status: COMPLETED | OUTPATIENT
Start: 2022-05-26 | End: 2022-05-30

## 2022-05-26 RX ORDER — DEXTROSE MONOHYDRATE AND SODIUM CHLORIDE 5; .45 G/100ML; G/100ML
INJECTION, SOLUTION INTRAVENOUS CONTINUOUS
Status: DISCONTINUED | OUTPATIENT
Start: 2022-05-26 | End: 2022-05-27

## 2022-05-26 RX ORDER — POTASSIUM CHLORIDE 7.45 MG/ML
40 INJECTION INTRAVENOUS
Status: DISCONTINUED | OUTPATIENT
Start: 2022-05-26 | End: 2022-05-27

## 2022-05-26 RX ORDER — POTASSIUM CHLORIDE 7.45 MG/ML
80 INJECTION INTRAVENOUS
Status: DISCONTINUED | OUTPATIENT
Start: 2022-05-26 | End: 2022-05-27

## 2022-05-26 RX ORDER — CALCIUM GLUCONATE 20 MG/ML
3 INJECTION, SOLUTION INTRAVENOUS
Status: DISCONTINUED | OUTPATIENT
Start: 2022-05-26 | End: 2022-05-27

## 2022-05-26 RX ADMIN — MUPIROCIN: 20 OINTMENT TOPICAL at 08:05

## 2022-05-26 RX ADMIN — PANTOPRAZOLE SODIUM 40 MG: 40 INJECTION, POWDER, LYOPHILIZED, FOR SOLUTION INTRAVENOUS at 05:05

## 2022-05-26 RX ADMIN — DEXTROSE AND SODIUM CHLORIDE: 5; .45 INJECTION, SOLUTION INTRAVENOUS at 05:05

## 2022-05-26 RX ADMIN — PROCHLORPERAZINE EDISYLATE 5 MG: 5 INJECTION INTRAMUSCULAR; INTRAVENOUS at 03:05

## 2022-05-26 RX ADMIN — PROCHLORPERAZINE EDISYLATE 5 MG: 5 INJECTION INTRAMUSCULAR; INTRAVENOUS at 02:05

## 2022-05-26 RX ADMIN — HYDROMORPHONE HYDROCHLORIDE 0.5 MG: 1 INJECTION, SOLUTION INTRAMUSCULAR; INTRAVENOUS; SUBCUTANEOUS at 06:05

## 2022-05-26 RX ADMIN — DEXTROSE AND SODIUM CHLORIDE 125 ML/HR: 5; .45 INJECTION, SOLUTION INTRAVENOUS at 07:05

## 2022-05-26 RX ADMIN — SODIUM CHLORIDE 1.07 UNITS/HR: 9 INJECTION, SOLUTION INTRAVENOUS at 10:05

## 2022-05-26 RX ADMIN — POTASSIUM CHLORIDE 60 MEQ: 7.46 INJECTION, SOLUTION INTRAVENOUS at 08:05

## 2022-05-26 RX ADMIN — POTASSIUM CHLORIDE 60 MEQ: 7.46 INJECTION, SOLUTION INTRAVENOUS at 07:05

## 2022-05-26 RX ADMIN — POTASSIUM CHLORIDE 60 MEQ: 7.46 INJECTION, SOLUTION INTRAVENOUS at 05:05

## 2022-05-26 RX ADMIN — POTASSIUM CHLORIDE 60 MEQ: 7.46 INJECTION, SOLUTION INTRAVENOUS at 11:05

## 2022-05-26 RX ADMIN — MUPIROCIN: 20 OINTMENT TOPICAL at 09:05

## 2022-05-26 RX ADMIN — HYDROMORPHONE HYDROCHLORIDE 0.5 MG: 1 INJECTION, SOLUTION INTRAMUSCULAR; INTRAVENOUS; SUBCUTANEOUS at 07:05

## 2022-05-26 RX ADMIN — POTASSIUM CHLORIDE 60 MEQ: 7.46 INJECTION, SOLUTION INTRAVENOUS at 10:05

## 2022-05-26 RX ADMIN — HYDROMORPHONE HYDROCHLORIDE 0.5 MG: 1 INJECTION, SOLUTION INTRAMUSCULAR; INTRAVENOUS; SUBCUTANEOUS at 11:05

## 2022-05-26 NOTE — NURSING
1120: Pt down to CT via wheelchair. VSS during transport.   1140: Pt back from CT, VSS during scan and upon arrival to unit. Transferred back to bed safely.

## 2022-05-26 NOTE — NURSING
Pt with only 250 mL urine out throughout shift despite IVF. Pt also with 1100 mL out NGT. Dr. Antoine notified, IVF increased to 200 mL/hr. Will cont to monitor.

## 2022-05-26 NOTE — ASSESSMENT & PLAN NOTE
Patient's FSGs are uncontrolled due to hyperglycemia on current medication regimen.  Last A1c reviewed-   Lab Results   Component Value Date    HGBA1C 7.0 (H) 05/24/2022     Most recent fingerstick glucose reviewed-   Recent Labs   Lab 05/25/22  1214 05/25/22  1417 05/25/22  1641 05/25/22  1946   POCTGLUCOSE 256* 253* 251* 293*     Maintain anti-hyperglycemic dose as follows-   Antihyperglycemics (From admission, onward)            Start     Stop Route Frequency Ordered    05/24/22 2015  insulin regular 1 Units/mL in sodium chloride 0.9% 100 mL infusion        Question Answer Comment   Insulin Rate Adjustment (DO NOT MODIFY ANSWER) \\[employer]\epic\Images\Pharmacy\InsulinInfusions\InsulinDKA [id number].pdf    Enter initial dose from Infusion Protocol Chart (Units/hr): 1        -- IV Continuous 05/24/22 1912        Hold Oral hypoglycemics while patient is in the hospital.  Continue q.4 electrolytes and q.4 hour POCT glucose and monitor closely in ICU.  Add dextrose containing IV fluids.  Wean off of drip once patient is taking enteral intake reliably.

## 2022-05-26 NOTE — PLAN OF CARE
"Pt AAOx4 on RA with o2 sat's 94%. Last xray of series ordered per MD Dayana. See results tab for results on xray. Pt given x2 dose prochlorperazine for n/v episodes. Around 2200, pt noted to have excess vomiting and no relief from prn medications. Notified eICU of vomiting and received ok to place pt NG tube to low intermittent suction. Pt states "my stomach feels better" after placed to suction. Pt c/o pain and given x1 dose hydromorphone with mild relief. Pt belching and passing gas. VVS. POC, fall precautions, and medications reviewed with patient. See flow sheets for further assessment and vital sign data. Bed alarm on, call light in reach, and bed in lowest position with wheels locked.     0500: Pt c/o of indigestion and discomfort. Notified Angella Colmenares NP and received orders for pantoprazole 40mg IV to be given. Pt given 60 mEq potassium replacement. Pt remains on insulin gt and D5 0.45% NS per DKA order set.    "

## 2022-05-26 NOTE — PLAN OF CARE
Pt remains in ICU. VSS throughout day. Remains on insulin gtt d/t NPO diet. Accuchecks ordered Q4h. D5 1/2 NS increased to 200 mL/hr d/t decreased UOP. NGT to LIS, 1100 mL out over shift. Compazine PRN given for intermittent nausea, relief obtained. Abd pain controlled with PRN dilaudid. Daughter @ bedside throughout day, updated on POC. Possible ex-lap planned for tomorrow-MD to evaluate in am. Pt to CT today. Midline placed. Up to bedside commode with standby assist. Safety checks done. Will cont to monitor and report off to oncoming nurse to assume care.       Problem: Adult Inpatient Plan of Care  Goal: Plan of Care Review  Outcome: Ongoing, Progressing     Problem: Adult Inpatient Plan of Care  Goal: Absence of Hospital-Acquired Illness or Injury  Outcome: Ongoing, Progressing     Problem: Adult Inpatient Plan of Care  Goal: Optimal Comfort and Wellbeing  Outcome: Ongoing, Progressing

## 2022-05-26 NOTE — PROGRESS NOTES
General Surgery Progress Note    Admit Date: 5/24/2022    Small bowel obstruction, DKA    Hospital Day: 3    SUBJECTIVE:   Gastrografin challenge attempted yesterday.  NG tube had to be returned to suction due to large volume emesis and abdominal discomfort.  Patient complaining of substernal pain and bloating partially alleviated by belching.  No significant abdominal pain.  She did pass small amount of flatus per rectum however.  No significant bowel function.    OBJECTIVE:     Vital Signs (Most Recent)  Temp:  [97.9 °F (36.6 °C)-98.9 °F (37.2 °C)] 98 °F (36.7 °C)  Pulse:  [] 96  Resp:  [18-22] 22  SpO2:  [94 %-100 %] 97 %  BP: ()/(66-88) 113/74    I&Os:  I/O last 3 completed shifts:  In: 4862.3 [I.V.:4180.6; NG/GT:160; IV Piggyback:521.7]  Out: 400 [Urine:400]    Physical Exam:  Gen: NAD, AAOx3  HEENT: Anicteric sclera, NG tube in place with bilious output in the canister  Pulm: unlabored, symmetrical   Abd:  Soft, mild distention, mild tenderness palpation, no rebound, no guarding    Laboratory:  CBC:   Recent Labs   Lab 05/26/22  0304   WBC 3.50*   RBC 4.40   HGB 14.2   HCT 41.4      MCV 94   MCH 32.3*   MCHC 34.3     CMP:   Recent Labs   Lab 05/24/22  1035 05/24/22  1943 05/26/22  0741   *   < > 228*   CALCIUM 9.6   < > 8.8   ALBUMIN 3.2*  --   --    PROT 7.1  --   --    *   < > 130*   K 3.3*   < > 3.3*   CO2 16*   < > 14*   CL 93*   < > 100   BUN 40*   < > 26*   CREATININE 1.4   < > 1.2   ALKPHOS 55  --   --    ALT 11  --   --    AST 13  --   --    BILITOT 0.8  --   --     < > = values in this interval not displayed.     Labs within the past 24 hours have been reviewed.      IMAGING  ABD XRAY 5/25/22 @ 2051  FINDINGS:  Supine position limits evaluation for free intra-abdominal air.  There are multiple loops of significantly distended small bowel again noted throughout the abdomen.  There is lucency outlining the right lateral liver margin and flank as well as the left lateral  flank musculature which in retrospect was seen to a similar degree on prior CT  image from 05/24/2022.  Skinfold artifact may be present however extraluminal intraperitoneal free air is a consideration and can have this appearance.  No other interval detrimental changes are seen.  Feeding tube tip overlies the proximal stomach region.     Impression:     Persistent distended loops of small bowel throughout the abdomen.  Increased lucency over the lateral abdomen and flank is noted raising concern for the possibility of free air on this supine exam.  Additional imaging with at least erect KUB possibly CT is recommended for consideration    ABD XRAY 5/26/22 @ 0724  FINDINGS:  Minimal atelectasis left lung base.  There is a gastric tube with tip in the proximal stomach.  There is a small volume of contrast in the proximal stomach and proximal small bowel.  There are multiple dilated gas-filled small bowel loops.  Right femoral vascular stent.  Bilateral hip arthroplasty.     Impression:     1. No pneumoperitoneum.  2. Continued evidence of small bowel obstruction.  3. Proximally positioned gastric tube.  Advancement by 5-10 cm would place tip closer to the mid stomach.  Correlate for desired position.    ASSESSMENT/PLAN:     Patient Active Problem List    Diagnosis Date Noted    DKA (diabetic ketoacidosis) 05/24/2022    Small bowel obstruction 05/24/2022    Hypokalemia 05/24/2022    Pain in joint of left knee 02/26/2018    Degenerative arthritis of left knee 02/05/2018         56 y.o. female     Small bowel obstruction  - no significant return of bowel function  - abdominal imaging shows persistent dilated loops of small bowel and contrast has not obviously progressed into the colon, expect ongoing obstruction  - concern for pneumoperitoneum on x-ray a on the evening of 05/25/2022 does not appear on repeat x-ray in the morning, abdominal exam is benign, will continue to monitor clinically  - if the patient  fails to progress will likely recommend exploratory laparotomy tomorrow  - in the meantime optimize electrolytes and continue NG tube to intermittent low wall suction, will ask the nurse to advance the tube 5-10 cm as recommended per Radiology read    Please call with any questions or concerns.    Juve Anne MD  General Surgery  [phone]

## 2022-05-26 NOTE — EICU
EICU FOLLOWUP NOTE:    Patient with diagnosis of DKA and small bowel obstruction.the nurse reports an episode of vomiting.  The patient has NG, will place to low intermittent wall suction    Kulwant Bowie MD  [facility]  [phone]

## 2022-05-26 NOTE — SUBJECTIVE & OBJECTIVE
Interval History:  Patient seen and examined.  Abdominal pain minimal.  Plan of care discussed with the patient and her daughter at the bedside in detail.    Review of Systems   Constitutional:  Positive for activity change and appetite change. Negative for fatigue.   Respiratory:  Negative for cough and shortness of breath.    Cardiovascular:  Negative for chest pain and leg swelling.   Gastrointestinal:  Positive for abdominal pain and nausea.   Neurological:  Negative for weakness.   Psychiatric/Behavioral:  Negative for confusion.    All other systems reviewed and are negative.  Objective:     Vital Signs (Most Recent):  Temp: 98.9 °F (37.2 °C) (05/25/22 1600)  Pulse: 101 (05/25/22 1944)  Resp: 20 (05/25/22 1725)  BP: 112/85 (05/25/22 1800)  SpO2: 97 % (05/25/22 1944) Vital Signs (24h Range):  Temp:  [97.9 °F (36.6 °C)-98.9 °F (37.2 °C)] 98.9 °F (37.2 °C)  Pulse:  [] 101  Resp:  [16-24] 20  SpO2:  [89 %-99 %] 97 %  BP: (107-129)/(70-88) 112/85     Weight: 70.3 kg (154 lb 15.7 oz)  Body mass index is 23.57 kg/m².    Intake/Output Summary (Last 24 hours) at 5/25/2022 2107  Last data filed at 5/25/2022 1808  Gross per 24 hour   Intake 3300.48 ml   Output 400 ml   Net 2900.48 ml      Physical Exam  Vitals and nursing note reviewed.   Constitutional:       General: She is not in acute distress.  HENT:      Nose:      Comments: Nasogastric tube inserted  Eyes:      Pupils: Pupils are equal, round, and reactive to light.   Cardiovascular:      Rate and Rhythm: Normal rate and regular rhythm.      Heart sounds: No murmur heard.  Pulmonary:      Effort: Pulmonary effort is normal.      Breath sounds: Normal breath sounds.   Abdominal:      General: There is no distension.      Palpations: Abdomen is soft.      Tenderness: There is abdominal tenderness. There is guarding.   Skin:     Coloration: Skin is not pale.      Findings: No rash.   Neurological:      Mental Status: She is alert and oriented to person, place,  and time.       Significant Labs: All pertinent labs within the past 24 hours have been reviewed.    Significant Imaging: I have reviewed all pertinent imaging results/findings within the past 24 hours.

## 2022-05-26 NOTE — NURSING
Dr. Arash regan, orders to clamp NGT for now, but if pt c/o nausea and/or vomits again, OK to place back to suction. Will cont to monitor.

## 2022-05-26 NOTE — CONSULTS
Food & Nutrition                                                           Education     Diet Education: Diabetic diet  Time Spent: 15 minutes  Learners: patient and family member        Nutrition Education provided with handouts: yes        Comments: Patient admitted with DKA. PMH diabetes A1C 7, HTN, hypercholesterolemia. Still NPO x 2 days r/t SBO, NGT just removed- RD following. PTA pt taking metformin for her diabetes and only eats small portions of carbs. Tells me she does not take insulin and has not had DKA before, don't know what caused it, but admits she had been sick and eating poorly x the last 10 days. Educated pt on diabetic diet and taught pt to carb count as well as sick day management. Pt said she did drink a full sugar smoothie before being admitted. I discouraged sugar containing drinks except in small amounts when unable to eat properly.    Assessment:     Moderate acute illness related malnutrition  R/t altered GI function, decreased appetite  As evidenced by  1) PO intakes < 50% of needs x 10-12 days  2) mild wasting @ clavicle, tricep, orbital/temples  Intervention: above  New    If unable to tolerate full liquids in 48 hr consider starting PPN- D5 AA4.25 @ 75 ml/hr + standard lipids    All questions and concerns answered. Dietitian's contact information provided.         Follow-Up: yes     Please Re-consult as needed           Thanks!

## 2022-05-26 NOTE — CONSULTS
18 Gx 10cm PowerGlide Midline placed to pts Right basilic vein with the use of ultrasound guidance.    Ultrasound guidance: yes  Vessel Caliber: large and patent, compressibility normal  Needle advanced into vessel with real time Ultrasound guidance.  Guidewire confirmed in vessel.  Image recorded and saved.  Sterile sheath used.  Sterile dressing applied  Arm circumference- 30cm  Dressing dated   Education provided to patient re: proper maintenance of line- pt verbalized understanding

## 2022-05-26 NOTE — NURSING
Pt c/o nausea, NGT currently clamped. Pt given PRN compazine given. Dr. Antoine notified, OK to [facility] NGT and place to LIS. Will cont to monitor.

## 2022-05-26 NOTE — NURSING
Per morning x-ray advance NGT 5-10 cm. NGT advanced 5-10 cm, pt tolerated well.     0945: Repeat CXR shows better position-NGT in proximal stomach.

## 2022-05-26 NOTE — ASSESSMENT & PLAN NOTE
Etiology likely related to adhesions secondary to previous surgery.  Continue NG tube placement.  General surgery consult in place.  P.r.n. medication for pain and nausea. Defer to surgery for diet advancement.

## 2022-05-26 NOTE — CARE UPDATE
05/26/22 0844   Patient Assessment/Suction   Level of Consciousness (AVPU)   (pt asleep)   Respiratory Effort Unlabored   Rhythm/Pattern, Respiratory unlabored   PRE-TX-O2   O2 Device (Oxygen Therapy) room air   Pulse Oximetry Type Continuous   $ Pulse Oximetry - Multiple Charge Pulse Oximetry - Multiple

## 2022-05-26 NOTE — ASSESSMENT & PLAN NOTE
Patient has hypokalemia which is currently uncontrolled. Last electrolytes reviewed-   Recent Labs   Lab 05/25/22  1244 05/25/22  1553 05/25/22  1947   K 3.2* 3.3* 3.1*   . Will replace potassium and monitor electrolytes closely.

## 2022-05-26 NOTE — PROGRESS NOTES
[facility] Hospital Medicine  Progress Note    Patient Name: Brooks Browning  MRN: [id number]  Patient Class: IP- Inpatient   Admission Date: 2022  Length of Stay: 1 days  Attending Physician: Shelton Antoine MD  Primary Care Provider: Sofía Murcia MD        Subjective:     Principal Problem:DKA (diabetic ketoacidosis)        HPI:  Patient is a 56-year-old  female with a past medical history significant for type 2 diabetes primarily dependent on metformin, hypertension, and multiple joint replacements who presents to the hospital with approximately 1 week of abdominal pain and vomiting.  Patient states that she initially started having abdominal pain approximately 6 days prior to admission with intermittent pain worsening to constant pain with bilious vomiting.  Patient denies any diarrhea or other GI complaints.  Denies any fever or other systemic symptoms.  Patient states that she has never had any episode like this previously.  She initially thought it was a stomach flu , but as it worsened, she presented to the emergency department.  At the outside emergency department, the patient was found to be in diabetic ketoacidosis with elevated beta hydroxybutyrate as well as metabolic acidosis and she was also found to have a secondary small-bowel obstruction.  Patient does have a history of  section in the past.  Patient was transferred from [facility] to [facility] for treatment of her various medical issues.      Overview/Hospital Course:  No notes on file    Interval History:  Patient seen and examined.  Abdominal pain minimal.  Plan of care discussed with the patient and her daughter at the bedside in detail.    Review of Systems   Constitutional:  Positive for activity change and appetite change. Negative for fatigue.   Respiratory:  Negative for cough and shortness of breath.    Cardiovascular:  Negative for chest pain and leg swelling.    Gastrointestinal:  Positive for abdominal pain and nausea.   Neurological:  Negative for weakness.   Psychiatric/Behavioral:  Negative for confusion.    All other systems reviewed and are negative.  Objective:     Vital Signs (Most Recent):  Temp: 98.9 °F (37.2 °C) (05/25/22 1600)  Pulse: 101 (05/25/22 1944)  Resp: 20 (05/25/22 1725)  BP: 112/85 (05/25/22 1800)  SpO2: 97 % (05/25/22 1944) Vital Signs (24h Range):  Temp:  [97.9 °F (36.6 °C)-98.9 °F (37.2 °C)] 98.9 °F (37.2 °C)  Pulse:  [] 101  Resp:  [16-24] 20  SpO2:  [89 %-99 %] 97 %  BP: (107-129)/(70-88) 112/85     Weight: 70.3 kg (154 lb 15.7 oz)  Body mass index is 23.57 kg/m².    Intake/Output Summary (Last 24 hours) at 5/25/2022 2107  Last data filed at 5/25/2022 1808  Gross per 24 hour   Intake 3300.48 ml   Output 400 ml   Net 2900.48 ml      Physical Exam  Vitals and nursing note reviewed.   Constitutional:       General: She is not in acute distress.  HENT:      Nose:      Comments: Nasogastric tube inserted  Eyes:      Pupils: Pupils are equal, round, and reactive to light.   Cardiovascular:      Rate and Rhythm: Normal rate and regular rhythm.      Heart sounds: No murmur heard.  Pulmonary:      Effort: Pulmonary effort is normal.      Breath sounds: Normal breath sounds.   Abdominal:      General: There is no distension.      Palpations: Abdomen is soft.      Tenderness: There is abdominal tenderness. There is guarding.   Skin:     Coloration: Skin is not pale.      Findings: No rash.   Neurological:      Mental Status: She is alert and oriented to person, place, and time.       Significant Labs: All pertinent labs within the past 24 hours have been reviewed.    Significant Imaging: I have reviewed all pertinent imaging results/findings within the past 24 hours.      Assessment/Plan:      * DKA (diabetic ketoacidosis)  Patient's FSGs are uncontrolled due to hyperglycemia on current medication regimen.  Last A1c reviewed-   Lab Results    Component Value Date    HGBA1C 7.0 (H) 05/24/2022     Most recent fingerstick glucose reviewed-   Recent Labs   Lab 05/25/22  1214 05/25/22  1417 05/25/22  1641 05/25/22 1946   POCTGLUCOSE 256* 253* 251* 293*     Maintain anti-hyperglycemic dose as follows-   Antihyperglycemics (From admission, onward)            Start     Stop Route Frequency Ordered    05/24/22 2015  insulin regular 1 Units/mL in sodium chloride 0.9% 100 mL infusion        Question Answer Comment   Insulin Rate Adjustment (DO NOT MODIFY ANSWER) \\[email or web address]\Images\Pharmacy\InsulinInfusions\InsulinDKA [id number].pdf    Enter initial dose from Infusion Protocol Chart (Units/hr): 1        -- IV Continuous 05/24/22 1912        Hold Oral hypoglycemics while patient is in the hospital.  Continue q.4 electrolytes and q.4 hour POCT glucose and monitor closely in ICU.  Add dextrose containing IV fluids.  Wean off of drip once patient is taking enteral intake reliably.        Hypokalemia  Patient has hypokalemia which is currently uncontrolled. Last electrolytes reviewed-   Recent Labs   Lab 05/25/22  1244 05/25/22  1553 05/25/22 1947   K 3.2* 3.3* 3.1*   . Will replace potassium and monitor electrolytes closely.         Small bowel obstruction  Etiology likely related to adhesions secondary to previous surgery.  Continue NG tube placement.  General surgery consult in place.  P.r.n. medication for pain and nausea. Defer to surgery for diet advancement.        VTE Risk Mitigation (From admission, onward)         Ordered     Place sequential compression device  Until discontinued         05/24/22 1912     IP VTE LOW RISK PATIENT  Once         05/24/22 1912                Discharge Planning   STEFANIE: 5/28/2022     Code Status: Full Code   Is the patient medically ready for discharge?:     Reason for patient still in hospital (select all that apply): Treatment  Discharge Plan A: Home            Critical care time spent on the evaluation and treatment of  severe organ dysfunction, review of pertinent labs and imaging studies, discussions with consulting providers and discussions with patient/family: 38 minutes.      Shelton Antoine MD  Department of Hospital Medicine   [facility]

## 2022-05-27 ENCOUNTER — ANESTHESIA EVENT (OUTPATIENT)
Dept: SURGERY | Facility: HOSPITAL | Age: 57
DRG: 335 | End: 2022-05-27
Payer: MEDICAID

## 2022-05-27 ENCOUNTER — ANESTHESIA (OUTPATIENT)
Dept: SURGERY | Facility: HOSPITAL | Age: 57
DRG: 335 | End: 2022-05-27
Payer: MEDICAID

## 2022-05-27 LAB
ABO + RH BLD: NORMAL
ANION GAP SERPL CALC-SCNC: 12 MMOL/L (ref 8–16)
ANION GAP SERPL CALC-SCNC: 14 MMOL/L (ref 8–16)
BASOPHILS # BLD AUTO: 0.04 K/UL (ref 0–0.2)
BASOPHILS NFR BLD: 1.1 % (ref 0–1.9)
BLD GP AB SCN CELLS X3 SERPL QL: NORMAL
BUN SERPL-MCNC: 13 MG/DL (ref 6–20)
BUN SERPL-MCNC: 16 MG/DL (ref 6–20)
BUN SERPL-MCNC: 18 MG/DL (ref 6–20)
BUN SERPL-MCNC: 19 MG/DL (ref 6–20)
CALCIUM SERPL-MCNC: 7.3 MG/DL (ref 8.7–10.5)
CALCIUM SERPL-MCNC: 8.3 MG/DL (ref 8.7–10.5)
CALCIUM SERPL-MCNC: 8.3 MG/DL (ref 8.7–10.5)
CALCIUM SERPL-MCNC: 8.5 MG/DL (ref 8.7–10.5)
CHLORIDE SERPL-SCNC: 101 MMOL/L (ref 95–110)
CHLORIDE SERPL-SCNC: 104 MMOL/L (ref 95–110)
CO2 SERPL-SCNC: 14 MMOL/L (ref 23–29)
CO2 SERPL-SCNC: 15 MMOL/L (ref 23–29)
CO2 SERPL-SCNC: 17 MMOL/L (ref 23–29)
CO2 SERPL-SCNC: 17 MMOL/L (ref 23–29)
CREAT SERPL-MCNC: 0.7 MG/DL (ref 0.5–1.4)
CREAT SERPL-MCNC: 0.8 MG/DL (ref 0.5–1.4)
DIFFERENTIAL METHOD: ABNORMAL
EOSINOPHIL # BLD AUTO: 0.1 K/UL (ref 0–0.5)
EOSINOPHIL NFR BLD: 2.4 % (ref 0–8)
ERYTHROCYTE [DISTWIDTH] IN BLOOD BY AUTOMATED COUNT: 12.5 % (ref 11.5–14.5)
EST. GFR  (AFRICAN AMERICAN): >60 ML/MIN/1.73 M^2
EST. GFR  (NON AFRICAN AMERICAN): >60 ML/MIN/1.73 M^2
GLUCOSE SERPL-MCNC: 180 MG/DL (ref 70–110)
GLUCOSE SERPL-MCNC: 187 MG/DL (ref 70–110)
GLUCOSE SERPL-MCNC: 207 MG/DL (ref 70–110)
GLUCOSE SERPL-MCNC: 227 MG/DL (ref 70–110)
HCT VFR BLD AUTO: 38.2 % (ref 37–48.5)
HGB BLD-MCNC: 12.8 G/DL (ref 12–16)
IMM GRANULOCYTES # BLD AUTO: 0.04 K/UL (ref 0–0.04)
IMM GRANULOCYTES NFR BLD AUTO: 1.1 % (ref 0–0.5)
LYMPHOCYTES # BLD AUTO: 1.4 K/UL (ref 1–4.8)
LYMPHOCYTES NFR BLD: 37.3 % (ref 18–48)
MCH RBC QN AUTO: 31.8 PG (ref 27–31)
MCHC RBC AUTO-ENTMCNC: 33.5 G/DL (ref 32–36)
MCV RBC AUTO: 95 FL (ref 82–98)
MONOCYTES # BLD AUTO: 0.6 K/UL (ref 0.3–1)
MONOCYTES NFR BLD: 15.4 % (ref 4–15)
NEUTROPHILS # BLD AUTO: 1.6 K/UL (ref 1.8–7.7)
NEUTROPHILS NFR BLD: 42.7 % (ref 38–73)
NRBC BLD-RTO: 0 /100 WBC
PHOSPHATE SERPL-MCNC: 1.3 MG/DL (ref 2.7–4.5)
PLATELET # BLD AUTO: 197 K/UL (ref 150–450)
PLATELET BLD QL SMEAR: ABNORMAL
PMV BLD AUTO: 9.4 FL (ref 9.2–12.9)
POCT GLUCOSE: 154 MG/DL (ref 70–110)
POCT GLUCOSE: 187 MG/DL (ref 70–110)
POCT GLUCOSE: 203 MG/DL (ref 70–110)
POCT GLUCOSE: 212 MG/DL (ref 70–110)
POCT GLUCOSE: 224 MG/DL (ref 70–110)
POCT GLUCOSE: 238 MG/DL (ref 70–110)
POCT GLUCOSE: 284 MG/DL (ref 70–110)
POCT GLUCOSE: 361 MG/DL (ref 70–110)
POTASSIUM SERPL-SCNC: 2.8 MMOL/L (ref 3.5–5.1)
POTASSIUM SERPL-SCNC: 3 MMOL/L (ref 3.5–5.1)
POTASSIUM SERPL-SCNC: 3.2 MMOL/L (ref 3.5–5.1)
POTASSIUM SERPL-SCNC: 3.4 MMOL/L (ref 3.5–5.1)
RBC # BLD AUTO: 4.03 M/UL (ref 4–5.4)
SODIUM SERPL-SCNC: 129 MMOL/L (ref 136–145)
SODIUM SERPL-SCNC: 130 MMOL/L (ref 136–145)
SODIUM SERPL-SCNC: 132 MMOL/L (ref 136–145)
SODIUM SERPL-SCNC: 133 MMOL/L (ref 136–145)
WBC # BLD AUTO: 3.7 K/UL (ref 3.9–12.7)

## 2022-05-27 PROCEDURE — 85025 COMPLETE CBC W/AUTO DIFF WBC: CPT | Performed by: HOSPITALIST

## 2022-05-27 PROCEDURE — D9220A PRA ANESTHESIA: ICD-10-PCS | Mod: CRNA,,, | Performed by: REGISTERED NURSE

## 2022-05-27 PROCEDURE — 44005 PR FREEING BOWEL ADHESION,ENTEROLYSIS: ICD-10-PCS | Mod: ,,, | Performed by: SURGERY

## 2022-05-27 PROCEDURE — 37000008 HC ANESTHESIA 1ST 15 MINUTES: Performed by: SURGERY

## 2022-05-27 PROCEDURE — 80048 BASIC METABOLIC PNL TOTAL CA: CPT | Mod: 91 | Performed by: HOSPITALIST

## 2022-05-27 PROCEDURE — 63600175 PHARM REV CODE 636 W HCPCS: Performed by: SURGERY

## 2022-05-27 PROCEDURE — 37000009 HC ANESTHESIA EA ADD 15 MINS: Performed by: SURGERY

## 2022-05-27 PROCEDURE — 80048 BASIC METABOLIC PNL TOTAL CA: CPT | Performed by: HOSPITALIST

## 2022-05-27 PROCEDURE — C1751 CATH, INF, PER/CENT/MIDLINE: HCPCS

## 2022-05-27 PROCEDURE — B4185 PARENTERAL SOL 10 GM LIPIDS: HCPCS | Performed by: HOSPITALIST

## 2022-05-27 PROCEDURE — D9220A PRA ANESTHESIA: ICD-10-PCS | Mod: ANES,,, | Performed by: ANESTHESIOLOGY

## 2022-05-27 PROCEDURE — A4216 STERILE WATER/SALINE, 10 ML: HCPCS | Performed by: HOSPITALIST

## 2022-05-27 PROCEDURE — 63600175 PHARM REV CODE 636 W HCPCS: Performed by: HOSPITALIST

## 2022-05-27 PROCEDURE — D9220A PRA ANESTHESIA: Mod: ANES,,, | Performed by: ANESTHESIOLOGY

## 2022-05-27 PROCEDURE — 25000003 PHARM REV CODE 250: Performed by: SURGERY

## 2022-05-27 PROCEDURE — 25000003 PHARM REV CODE 250: Performed by: HOSPITALIST

## 2022-05-27 PROCEDURE — 36000707: Performed by: SURGERY

## 2022-05-27 PROCEDURE — 71000033 HC RECOVERY, INTIAL HOUR: Performed by: SURGERY

## 2022-05-27 PROCEDURE — 86901 BLOOD TYPING SEROLOGIC RH(D): CPT | Performed by: ANESTHESIOLOGY

## 2022-05-27 PROCEDURE — D9220A PRA ANESTHESIA: Mod: CRNA,,, | Performed by: REGISTERED NURSE

## 2022-05-27 PROCEDURE — 36415 COLL VENOUS BLD VENIPUNCTURE: CPT | Performed by: HOSPITALIST

## 2022-05-27 PROCEDURE — 20000000 HC ICU ROOM

## 2022-05-27 PROCEDURE — 36568 INSJ PICC <5 YR W/O IMAGING: CPT

## 2022-05-27 PROCEDURE — C9290 INJ, BUPIVACAINE LIPOSOME: HCPCS | Performed by: SURGERY

## 2022-05-27 PROCEDURE — 94761 N-INVAS EAR/PLS OXIMETRY MLT: CPT

## 2022-05-27 PROCEDURE — 63600175 PHARM REV CODE 636 W HCPCS: Performed by: REGISTERED NURSE

## 2022-05-27 PROCEDURE — 44005 FREEING OF BOWEL ADHESION: CPT | Mod: ,,, | Performed by: SURGERY

## 2022-05-27 PROCEDURE — 99231 PR SUBSEQUENT HOSPITAL CARE,LEVL I: ICD-10-PCS | Mod: 57,,, | Performed by: SURGERY

## 2022-05-27 PROCEDURE — 27201423 OPTIME MED/SURG SUP & DEVICES STERILE SUPPLY: Performed by: SURGERY

## 2022-05-27 PROCEDURE — 71000039 HC RECOVERY, EACH ADD'L HOUR: Performed by: SURGERY

## 2022-05-27 PROCEDURE — 36000706: Performed by: SURGERY

## 2022-05-27 PROCEDURE — 84100 ASSAY OF PHOSPHORUS: CPT | Performed by: HOSPITALIST

## 2022-05-27 PROCEDURE — S5010 5% DEXTROSE AND 0.45% SALINE: HCPCS | Performed by: HOSPITALIST

## 2022-05-27 PROCEDURE — 25000003 PHARM REV CODE 250: Performed by: REGISTERED NURSE

## 2022-05-27 PROCEDURE — 99231 SBSQ HOSP IP/OBS SF/LOW 25: CPT | Mod: 57,,, | Performed by: SURGERY

## 2022-05-27 RX ORDER — HYDROMORPHONE HYDROCHLORIDE 2 MG/ML
0.2 INJECTION, SOLUTION INTRAMUSCULAR; INTRAVENOUS; SUBCUTANEOUS EVERY 5 MIN PRN
Status: DISCONTINUED | OUTPATIENT
Start: 2022-05-27 | End: 2022-05-27

## 2022-05-27 RX ORDER — PROPOFOL 10 MG/ML
VIAL (ML) INTRAVENOUS
Status: DISCONTINUED | OUTPATIENT
Start: 2022-05-27 | End: 2022-05-27

## 2022-05-27 RX ORDER — BUPIVACAINE HYDROCHLORIDE 2.5 MG/ML
INJECTION, SOLUTION EPIDURAL; INFILTRATION; INTRACAUDAL
Status: DISCONTINUED | OUTPATIENT
Start: 2022-05-27 | End: 2022-05-27 | Stop reason: HOSPADM

## 2022-05-27 RX ORDER — HYDROCODONE BITARTRATE AND ACETAMINOPHEN 5; 325 MG/1; MG/1
1 TABLET ORAL EVERY 6 HOURS PRN
Status: DISCONTINUED | OUTPATIENT
Start: 2022-05-27 | End: 2022-05-27

## 2022-05-27 RX ORDER — FENTANYL CITRATE 50 UG/ML
INJECTION, SOLUTION INTRAMUSCULAR; INTRAVENOUS
Status: DISCONTINUED | OUTPATIENT
Start: 2022-05-27 | End: 2022-05-27

## 2022-05-27 RX ORDER — ROCURONIUM BROMIDE 10 MG/ML
INJECTION, SOLUTION INTRAVENOUS
Status: DISCONTINUED | OUTPATIENT
Start: 2022-05-27 | End: 2022-05-27

## 2022-05-27 RX ORDER — LIDOCAINE HYDROCHLORIDE 20 MG/ML
INJECTION INTRAVENOUS
Status: DISCONTINUED | OUTPATIENT
Start: 2022-05-27 | End: 2022-05-27

## 2022-05-27 RX ORDER — CALCIUM GLUCONATE 20 MG/ML
1 INJECTION, SOLUTION INTRAVENOUS
Status: DISCONTINUED | OUTPATIENT
Start: 2022-05-27 | End: 2022-06-01 | Stop reason: HOSPADM

## 2022-05-27 RX ORDER — EPHEDRINE SULFATE 50 MG/ML
INJECTION, SOLUTION INTRAVENOUS
Status: DISCONTINUED | OUTPATIENT
Start: 2022-05-27 | End: 2022-05-27

## 2022-05-27 RX ORDER — KETOROLAC TROMETHAMINE 30 MG/ML
INJECTION, SOLUTION INTRAMUSCULAR; INTRAVENOUS
Status: DISCONTINUED | OUTPATIENT
Start: 2022-05-27 | End: 2022-05-27

## 2022-05-27 RX ORDER — MAGNESIUM SULFATE HEPTAHYDRATE 40 MG/ML
2 INJECTION, SOLUTION INTRAVENOUS
Status: DISCONTINUED | OUTPATIENT
Start: 2022-05-27 | End: 2022-06-01 | Stop reason: HOSPADM

## 2022-05-27 RX ORDER — ONDANSETRON HYDROCHLORIDE 2 MG/ML
INJECTION, SOLUTION INTRAMUSCULAR; INTRAVENOUS
Status: DISCONTINUED | OUTPATIENT
Start: 2022-05-27 | End: 2022-05-27

## 2022-05-27 RX ORDER — SODIUM CHLORIDE 0.9 % (FLUSH) 0.9 %
10 SYRINGE (ML) INJECTION EVERY 6 HOURS
Status: DISCONTINUED | OUTPATIENT
Start: 2022-05-27 | End: 2022-06-01 | Stop reason: HOSPADM

## 2022-05-27 RX ORDER — ACETAMINOPHEN 10 MG/ML
INJECTION, SOLUTION INTRAVENOUS
Status: DISCONTINUED | OUTPATIENT
Start: 2022-05-27 | End: 2022-05-27

## 2022-05-27 RX ORDER — MIDAZOLAM HYDROCHLORIDE 1 MG/ML
INJECTION INTRAMUSCULAR; INTRAVENOUS
Status: DISCONTINUED | OUTPATIENT
Start: 2022-05-27 | End: 2022-05-27

## 2022-05-27 RX ORDER — POTASSIUM CHLORIDE 29.8 MG/ML
80 INJECTION INTRAVENOUS
Status: DISCONTINUED | OUTPATIENT
Start: 2022-05-27 | End: 2022-06-01 | Stop reason: HOSPADM

## 2022-05-27 RX ORDER — MAGNESIUM SULFATE HEPTAHYDRATE 40 MG/ML
4 INJECTION, SOLUTION INTRAVENOUS
Status: DISCONTINUED | OUTPATIENT
Start: 2022-05-27 | End: 2022-06-01 | Stop reason: HOSPADM

## 2022-05-27 RX ORDER — CALCIUM GLUCONATE 20 MG/ML
2 INJECTION, SOLUTION INTRAVENOUS
Status: DISCONTINUED | OUTPATIENT
Start: 2022-05-27 | End: 2022-06-01 | Stop reason: HOSPADM

## 2022-05-27 RX ORDER — SUCCINYLCHOLINE CHLORIDE 20 MG/ML
INJECTION INTRAMUSCULAR; INTRAVENOUS
Status: DISCONTINUED | OUTPATIENT
Start: 2022-05-27 | End: 2022-05-27

## 2022-05-27 RX ORDER — PHENYLEPHRINE HYDROCHLORIDE 10 MG/ML
INJECTION INTRAVENOUS
Status: DISCONTINUED | OUTPATIENT
Start: 2022-05-27 | End: 2022-05-27

## 2022-05-27 RX ORDER — CALCIUM GLUCONATE 20 MG/ML
3 INJECTION, SOLUTION INTRAVENOUS
Status: DISCONTINUED | OUTPATIENT
Start: 2022-05-27 | End: 2022-06-01 | Stop reason: HOSPADM

## 2022-05-27 RX ORDER — DEXAMETHASONE SODIUM PHOSPHATE 4 MG/ML
INJECTION, SOLUTION INTRA-ARTICULAR; INTRALESIONAL; INTRAMUSCULAR; INTRAVENOUS; SOFT TISSUE
Status: DISCONTINUED | OUTPATIENT
Start: 2022-05-27 | End: 2022-05-27

## 2022-05-27 RX ORDER — KETAMINE HYDROCHLORIDE 100 MG/ML
INJECTION, SOLUTION INTRAMUSCULAR; INTRAVENOUS
Status: DISCONTINUED | OUTPATIENT
Start: 2022-05-27 | End: 2022-05-27

## 2022-05-27 RX ORDER — SODIUM CHLORIDE 0.9 % (FLUSH) 0.9 %
10 SYRINGE (ML) INJECTION
Status: DISCONTINUED | OUTPATIENT
Start: 2022-05-27 | End: 2022-06-01 | Stop reason: HOSPADM

## 2022-05-27 RX ORDER — POTASSIUM CHLORIDE 29.8 MG/ML
40 INJECTION INTRAVENOUS
Status: DISCONTINUED | OUTPATIENT
Start: 2022-05-27 | End: 2022-06-01 | Stop reason: HOSPADM

## 2022-05-27 RX ORDER — POTASSIUM CHLORIDE 14.9 MG/ML
60 INJECTION INTRAVENOUS
Status: DISCONTINUED | OUTPATIENT
Start: 2022-05-27 | End: 2022-06-01 | Stop reason: HOSPADM

## 2022-05-27 RX ADMIN — KETOROLAC TROMETHAMINE 30 MG: 30 INJECTION, SOLUTION INTRAMUSCULAR; INTRAVENOUS at 12:05

## 2022-05-27 RX ADMIN — HYDROMORPHONE HYDROCHLORIDE 0.5 MG: 1 INJECTION, SOLUTION INTRAMUSCULAR; INTRAVENOUS; SUBCUTANEOUS at 05:05

## 2022-05-27 RX ADMIN — SOYBEAN OIL 250 ML: 20 INJECTION, SOLUTION INTRAVENOUS at 09:05

## 2022-05-27 RX ADMIN — SODIUM PHOSPHATE, MONOBASIC, MONOHYDRATE 30 MMOL: 276; 142 INJECTION, SOLUTION INTRAVENOUS at 10:05

## 2022-05-27 RX ADMIN — GLYCOPYRROLATE 0.1 MG: 0.2 INJECTION, SOLUTION INTRAMUSCULAR; INTRAVITREAL at 11:05

## 2022-05-27 RX ADMIN — ROCURONIUM BROMIDE 5 MG: 10 INJECTION, SOLUTION INTRAVENOUS at 11:05

## 2022-05-27 RX ADMIN — ACETAMINOPHEN 1000 MG: 10 INJECTION, SOLUTION INTRAVENOUS at 12:05

## 2022-05-27 RX ADMIN — SODIUM CHLORIDE, PRESERVATIVE FREE 10 ML: 5 INJECTION INTRAVENOUS at 06:05

## 2022-05-27 RX ADMIN — PHENYLEPHRINE HYDROCHLORIDE 200 MCG: 10 INJECTION INTRAVENOUS at 12:05

## 2022-05-27 RX ADMIN — HYDROMORPHONE HYDROCHLORIDE 0.5 MG: 1 INJECTION, SOLUTION INTRAMUSCULAR; INTRAVENOUS; SUBCUTANEOUS at 08:05

## 2022-05-27 RX ADMIN — FENTANYL CITRATE 50 MCG: 50 INJECTION, SOLUTION INTRAMUSCULAR; INTRAVENOUS at 12:05

## 2022-05-27 RX ADMIN — PHENYLEPHRINE HYDROCHLORIDE 300 MCG: 10 INJECTION INTRAVENOUS at 11:05

## 2022-05-27 RX ADMIN — EPHEDRINE SULFATE 10 MG: 50 INJECTION, SOLUTION INTRAMUSCULAR; INTRAVENOUS; SUBCUTANEOUS at 11:05

## 2022-05-27 RX ADMIN — FENTANYL CITRATE 25 MCG: 50 INJECTION, SOLUTION INTRAMUSCULAR; INTRAVENOUS at 12:05

## 2022-05-27 RX ADMIN — DEXTROSE AND SODIUM CHLORIDE: 5; .45 INJECTION, SOLUTION INTRAVENOUS at 08:05

## 2022-05-27 RX ADMIN — SODIUM BICARBONATE: 84 INJECTION, SOLUTION INTRAVENOUS at 02:05

## 2022-05-27 RX ADMIN — ROCURONIUM BROMIDE 45 MG: 10 INJECTION, SOLUTION INTRAVENOUS at 12:05

## 2022-05-27 RX ADMIN — LEUCINE, PHENYLALANINE, LYSINE, METHIONINE, ISOLEUCINE, VALINE, HISTIDINE, THREONINE, TRYPTOPHAN, ALANINE, GLYCINE, ARGININE, PROLINE, SERINE, TYROSINE, SODIUM ACETATE, DIBASIC POTASSIUM PHOSPHATE, MAGNESIUM CHLORIDE, SODIUM CHLORIDE, CALCIUM CHLORIDE, DEXTROSE
365; 280; 290; 200; 300; 290; 240; 210; 90; 1035; 515; 575; 340; 250; 20; 340; 261; 51; 59; 33; 15 INJECTION INTRAVENOUS at 07:05

## 2022-05-27 RX ADMIN — CEFAZOLIN 2 G: 1 INJECTION, POWDER, FOR SOLUTION INTRAVENOUS at 11:05

## 2022-05-27 RX ADMIN — FENTANYL CITRATE 25 MCG: 50 INJECTION, SOLUTION INTRAMUSCULAR; INTRAVENOUS at 01:05

## 2022-05-27 RX ADMIN — MUPIROCIN: 20 OINTMENT TOPICAL at 08:05

## 2022-05-27 RX ADMIN — PROPOFOL 150 MG: 10 INJECTION, EMULSION INTRAVENOUS at 11:05

## 2022-05-27 RX ADMIN — POTASSIUM CHLORIDE 80 MEQ: 29.8 INJECTION, SOLUTION INTRAVENOUS at 06:05

## 2022-05-27 RX ADMIN — PROCHLORPERAZINE EDISYLATE 5 MG: 5 INJECTION INTRAMUSCULAR; INTRAVENOUS at 12:05

## 2022-05-27 RX ADMIN — PHENYLEPHRINE HYDROCHLORIDE 16 MCG/MIN: 10 INJECTION INTRAVENOUS at 11:05

## 2022-05-27 RX ADMIN — MUPIROCIN: 20 OINTMENT TOPICAL at 09:05

## 2022-05-27 RX ADMIN — DEXAMETHASONE SODIUM PHOSPHATE 4 MG: 4 INJECTION, SOLUTION INTRA-ARTICULAR; INTRALESIONAL; INTRAMUSCULAR; INTRAVENOUS; SOFT TISSUE at 11:05

## 2022-05-27 RX ADMIN — EPHEDRINE SULFATE 5 MG: 50 INJECTION, SOLUTION INTRAMUSCULAR; INTRAVENOUS; SUBCUTANEOUS at 12:05

## 2022-05-27 RX ADMIN — PHENYLEPHRINE HYDROCHLORIDE 100 MCG: 10 INJECTION INTRAVENOUS at 11:05

## 2022-05-27 RX ADMIN — SUCCINYLCHOLINE CHLORIDE 180 MG: 20 INJECTION, SOLUTION INTRAMUSCULAR; INTRAVENOUS; PARENTERAL at 11:05

## 2022-05-27 RX ADMIN — SODIUM CHLORIDE, SODIUM GLUCONATE, SODIUM ACETATE, POTASSIUM CHLORIDE, MAGNESIUM CHLORIDE, SODIUM PHOSPHATE, DIBASIC, AND POTASSIUM PHOSPHATE: .53; .5; .37; .037; .03; .012; .00082 INJECTION, SOLUTION INTRAVENOUS at 11:05

## 2022-05-27 RX ADMIN — KETAMINE HYDROCHLORIDE 20 MG: 100 INJECTION, SOLUTION, CONCENTRATE INTRAMUSCULAR; INTRAVENOUS at 12:05

## 2022-05-27 RX ADMIN — SODIUM CHLORIDE, PRESERVATIVE FREE 10 ML: 5 INJECTION INTRAVENOUS at 11:05

## 2022-05-27 RX ADMIN — ONDANSETRON 4 MG: 2 INJECTION, SOLUTION INTRAMUSCULAR; INTRAVENOUS at 11:05

## 2022-05-27 RX ADMIN — MIDAZOLAM HYDROCHLORIDE 2 MG: 1 INJECTION, SOLUTION INTRAMUSCULAR; INTRAVENOUS at 11:05

## 2022-05-27 RX ADMIN — SODIUM CHLORIDE, SODIUM GLUCONATE, SODIUM ACETATE, POTASSIUM CHLORIDE, MAGNESIUM CHLORIDE, SODIUM PHOSPHATE, DIBASIC, AND POTASSIUM PHOSPHATE: .53; .5; .37; .037; .03; .012; .00082 INJECTION, SOLUTION INTRAVENOUS at 12:05

## 2022-05-27 RX ADMIN — KETAMINE HYDROCHLORIDE 30 MG: 100 INJECTION, SOLUTION, CONCENTRATE INTRAMUSCULAR; INTRAVENOUS at 11:05

## 2022-05-27 RX ADMIN — SUGAMMADEX 200 MG: 100 INJECTION, SOLUTION INTRAVENOUS at 01:05

## 2022-05-27 RX ADMIN — SODIUM BICARBONATE: 84 INJECTION, SOLUTION INTRAVENOUS at 11:05

## 2022-05-27 RX ADMIN — LIDOCAINE HYDROCHLORIDE 50 MG: 20 INJECTION, SOLUTION INTRAVENOUS at 11:05

## 2022-05-27 NOTE — ASSESSMENT & PLAN NOTE
Etiology likely related to adhesions secondary to previous surgery.  Continue NG tube placement.  General surgery consult in place.  P.r.n. medication for pain and nausea.  Patient to go to the OR in the morning for exploratory laparotomy.

## 2022-05-27 NOTE — ASSESSMENT & PLAN NOTE
Patient has hypokalemia which is currently uncontrolled. Last electrolytes reviewed-   Recent Labs   Lab 05/26/22  0741 05/26/22  1633 05/26/22 2004   K 3.3* 3.5 2.9*   . Will replace potassium and monitor electrolytes closely.

## 2022-05-27 NOTE — PLAN OF CARE
Pt AAox4 on RA. NGT to low intermittent suction. Pt c/o abd fullness and nausea; given x1 dose compazine. Pt given CHG bath and remains NPO. Urinary output adequate during shift. See flow sheets for further assessment and vital sign data. D5W 0.45% NS running at 200 ml/hr and insulin titrated per protocol. Bed alarm on, call light in reach,  wheels locked, and bed in lowest position.

## 2022-05-27 NOTE — ASSESSMENT & PLAN NOTE
Patient's FSGs are uncontrolled due to hyperglycemia on current medication regimen.  Last A1c reviewed-   Lab Results   Component Value Date    HGBA1C 7.0 (H) 05/24/2022     Most recent fingerstick glucose reviewed-   Recent Labs   Lab 05/26/22  1623 05/26/22  1626 05/26/22  1627 05/26/22 2002   POCTGLUCOSE 356* 382* 100 210*     Maintain anti-hyperglycemic dose as follows-   Antihyperglycemics (From admission, onward)            Start     Stop Route Frequency Ordered    05/24/22 2015  insulin regular 1 Units/mL in sodium chloride 0.9% 100 mL infusion        Question Answer Comment   Insulin Rate Adjustment (DO NOT MODIFY ANSWER) \\[employer]\Images\Pharmacy\InsulinInfusions\InsulinDKA [id number].pdf    Enter initial dose from Infusion Protocol Chart (Units/hr): 1        -- IV Continuous 05/24/22 1912        Hold Oral hypoglycemics while patient is in the hospital.  Continue q.4 electrolytes and q.4 hour POCT glucose and monitor closely in ICU.  Add dextrose containing IV fluids.  Wean off of drip once patient is taking enteral intake reliably.

## 2022-05-27 NOTE — PLAN OF CARE
POC reviewed w/ pt - verbalized understanding. A&O x 4. VSS throughout day. Pt went to surgery today w/ Dr. Anne for ex-lap for SBO - adhesions lysed; island border dressing CDI. Insulin gtt still infusing - q4 accuchecks per order and infusion titrated per algorithm. RUE PICC placed today. Q4 BMPs. NGT in place - still having increased bilious output. Pt tolerating ice chips post-op - denies nausea. Safety measures in place.      Problem: Adult Inpatient Plan of Care  Goal: Plan of Care Review  Outcome: Ongoing, Progressing  Goal: Patient-Specific Goal (Individualized)  Outcome: Ongoing, Progressing     Problem: Diabetes Comorbidity  Goal: Blood Glucose Level Within Targeted Range  Outcome: Ongoing, Progressing     Problem: Diabetic Ketoacidosis  Goal: Fluid and Electrolyte Balance with Absence of Ketosis  Outcome: Ongoing, Progressing     Problem: Skin Injury Risk Increased  Goal: Skin Health and Integrity  Outcome: Ongoing, Progressing

## 2022-05-27 NOTE — PROCEDURES
"Brooks Browning is a 56 y.o. female patient.    Temp: 97.9 °F (36.6 °C) (05/27/22 1400)  Pulse: 85 (05/27/22 1515)  Resp: 19 (05/27/22 1515)  BP: 114/79 (05/27/22 1500)  SpO2: 97 % (05/27/22 1515)  Weight: 70.3 kg (154 lb 15.7 oz) (05/27/22 0500)  Height: 5' 8" (172.7 cm) (05/25/22 1326)    PICC  Date/Time: 5/27/2022 3:36 PM  Location procedure was performed: [facility] ICU  Performed by: Hoa Sands RN  Assisting provider: Mickie Garcia RN  Consent Done: Yes  Time out: Immediately prior to procedure a time out was called to verify the correct patient, procedure, equipment, support staff and site/side marked as required  Indications: med administration and vascular access  Anesthesia: local infiltration  Local anesthetic: lidocaine 1% without epinephrine  Anesthetic Total (mL): 2  Preparation: skin prepped with ChloraPrep  Skin prep agent dried: skin prep agent completely dried prior to procedure  Sterile barriers: all five maximum sterile barriers used - cap, mask, sterile gown, sterile gloves, and large sterile sheet  Hand hygiene: hand hygiene performed prior to central venous catheter insertion  Location details: right basilic  Catheter type: triple lumen  Catheter size: 5 Fr  Catheter Length: 36cm    Ultrasound guidance: yes  Vessel Caliber: medium and patent, compressibility normal  Vascular Doppler: not done  Guidewire confirmed in vessel.  Image recorded and saved.  Sterile sheath used.  no esophageal manometryNumber of attempts: 1  Post-procedure: blood return through all ports, chlorhexidine patch and sterile dressing applied  Technical procedures used: eda;tyrell 3cg  Estimated blood loss (mL): 0  Specimens: No  Implants: No  Assessment: placement verified by x-ray, free fluid flow, no pneumothorax on x-ray, tip termination and successful placement  Complications: none          Name mp  5/27/2022    "

## 2022-05-27 NOTE — ANESTHESIA PROCEDURE NOTES
Intubation    Date/Time: 5/27/2022 11:53 AM  Performed by: Jono Moya CRNA  Authorized by: Rich Chandra MD     Intubation:     Induction:  Intravenous    Intubation status: RSI, NGT to suction.    Attempts:  1    Attempted By:  DILAN    Blade:  Evans 3    Laryngeal View Grade: Grade I - full view of cords      Difficult Airway Encountered?: No      Complications:  None    Airway Device:  Oral endotracheal tube    Airway Device Size:  7.0    Style/Cuff Inflation:  Cuffed    Tube secured:  21    Secured at:  The lips    Placement Verified By:  Capnometry    Complicating Factors:  None    Findings Post-Intubation:  BS equal bilateral

## 2022-05-27 NOTE — CARE UPDATE
05/27/22 0645   Patient Assessment/Suction   Level of Consciousness (AVPU) alert   PRE-TX-O2   O2 Device (Oxygen Therapy) room air   SpO2 98 %   Pulse Oximetry Type Continuous   $ Pulse Oximetry - Multiple Charge Pulse Oximetry - Multiple   Pulse 84   Resp (!) 24   Positioning HOB elevated 30 degrees

## 2022-05-27 NOTE — PROGRESS NOTES
[facility] Hospital Medicine  Progress Note    Patient Name: Brooks Browning  MRN: [id number]  Patient Class: IP- Inpatient   Admission Date: 2022  Length of Stay: 2 days  Attending Physician: Shelton Antoine MD  Primary Care Provider: Sofía Murcia MD        Subjective:     Principal Problem:DKA (diabetic ketoacidosis)        HPI:  Patient is a 56-year-old  female with a past medical history significant for type 2 diabetes primarily dependent on metformin, hypertension, and multiple joint replacements who presents to the hospital with approximately 1 week of abdominal pain and vomiting.  Patient states that she initially started having abdominal pain approximately 6 days prior to admission with intermittent pain worsening to constant pain with bilious vomiting.  Patient denies any diarrhea or other GI complaints.  Denies any fever or other systemic symptoms.  Patient states that she has never had any episode like this previously.  She initially thought it was a stomach flu , but as it worsened, she presented to the emergency department.  At the outside emergency department, the patient was found to be in diabetic ketoacidosis with elevated beta hydroxybutyrate as well as metabolic acidosis and she was also found to have a secondary small-bowel obstruction.  Patient does have a history of  section in the past.  Patient was transferred from [facility] to [facility] for treatment of her various medical issues.      Overview/Hospital Course:  No notes on file    Interval History:  Patient seen and examined.  Abdominal pain minimal.  Plan of care discussed with the patient and her daughter at the bedside in detail.  Contrast study shows no evidence of passage of contrast, patient will likely be going to the OR in the morning for exploratory laparotomy.  Sugars remained stable.    Review of Systems   Constitutional:  Positive for activity change  and appetite change. Negative for fatigue.   Respiratory:  Negative for cough and shortness of breath.    Cardiovascular:  Negative for chest pain and leg swelling.   Gastrointestinal:  Positive for abdominal pain and nausea.   Neurological:  Negative for weakness.   Psychiatric/Behavioral:  Negative for confusion.    All other systems reviewed and are negative.  Objective:     Vital Signs (Most Recent):  Temp: 98.9 °F (37.2 °C) (05/26/22 1600)  Pulse: 93 (05/26/22 1926)  Resp: 20 (05/26/22 1926)  BP: 137/85 (05/26/22 1800)  SpO2: 95 % (05/26/22 1926) Vital Signs (24h Range):  Temp:  [98 °F (36.7 °C)-99 °F (37.2 °C)] 98.9 °F (37.2 °C)  Pulse:  [] 93  Resp:  [13-29] 20  SpO2:  [94 %-100 %] 95 %  BP: ()/(66-88) 137/85     Weight: 70.3 kg (154 lb 15.7 oz)  Body mass index is 23.57 kg/m².    Intake/Output Summary (Last 24 hours) at 5/26/2022 2051  Last data filed at 5/26/2022 1803  Gross per 24 hour   Intake 3451.2 ml   Output 1450 ml   Net 2001.2 ml        Physical Exam  Vitals and nursing note reviewed.   Constitutional:       General: She is not in acute distress.  HENT:      Nose:      Comments: Nasogastric tube inserted  Eyes:      Pupils: Pupils are equal, round, and reactive to light.   Cardiovascular:      Rate and Rhythm: Normal rate and regular rhythm.      Heart sounds: No murmur heard.  Pulmonary:      Effort: Pulmonary effort is normal.      Breath sounds: Normal breath sounds.   Abdominal:      General: There is no distension.      Palpations: Abdomen is soft.      Tenderness: There is abdominal tenderness. There is guarding.   Skin:     Coloration: Skin is not pale.      Findings: No rash.   Neurological:      Mental Status: She is alert and oriented to person, place, and time.       Significant Labs: All pertinent labs within the past 24 hours have been reviewed.    Significant Imaging: I have reviewed all pertinent imaging results/findings within the past 24 hours.      Assessment/Plan:       * DKA (diabetic ketoacidosis)  Patient's FSGs are uncontrolled due to hyperglycemia on current medication regimen.  Last A1c reviewed-   Lab Results   Component Value Date    HGBA1C 7.0 (H) 05/24/2022     Most recent fingerstick glucose reviewed-   Recent Labs   Lab 05/26/22  1623 05/26/22  1626 05/26/22  1627 05/26/22 2002   POCTGLUCOSE 356* 382* 100 210*     Maintain anti-hyperglycemic dose as follows-   Antihyperglycemics (From admission, onward)            Start     Stop Route Frequency Ordered    05/24/22 2015  insulin regular 1 Units/mL in sodium chloride 0.9% 100 mL infusion        Question Answer Comment   Insulin Rate Adjustment (DO NOT MODIFY ANSWER) \\[email or web address]\Images\Pharmacy\InsulinInfusions\InsulinDKA [id number].pdf    Enter initial dose from Infusion Protocol Chart (Units/hr): 1        -- IV Continuous 05/24/22 1912        Hold Oral hypoglycemics while patient is in the hospital.  Continue q.4 electrolytes and q.4 hour POCT glucose and monitor closely in ICU.  Add dextrose containing IV fluids.  Wean off of drip once patient is taking enteral intake reliably.        Hypokalemia  Patient has hypokalemia which is currently uncontrolled. Last electrolytes reviewed-   Recent Labs   Lab 05/26/22  0741 05/26/22  1633 05/26/22 2004   K 3.3* 3.5 2.9*   . Will replace potassium and monitor electrolytes closely.         Small bowel obstruction  Etiology likely related to adhesions secondary to previous surgery.  Continue NG tube placement.  General surgery consult in place.  P.r.n. medication for pain and nausea.  Patient to go to the OR in the morning for exploratory laparotomy.        VTE Risk Mitigation (From admission, onward)         Ordered     Place sequential compression device  Until discontinued         05/24/22 1912     IP VTE LOW RISK PATIENT  Once         05/24/22 1912                Discharge Planning   STEFANIE: 5/28/2022     Code Status: Full Code   Is the patient medically ready for  discharge?:     Reason for patient still in hospital (select all that apply): Treatment  Discharge Plan A: Home            Critical care time spent on the evaluation and treatment of severe organ dysfunction, review of pertinent labs and imaging studies, discussions with consulting providers and discussions with patient/family: 37 minutes.      Shelton Antoine MD  Department of Hospital Medicine   [facility]

## 2022-05-27 NOTE — SUBJECTIVE & OBJECTIVE
Interval History:  Patient seen and examined.  Abdominal pain minimal.  Plan of care discussed with the patient and her daughter at the bedside in detail.  Contrast study shows no evidence of passage of contrast, patient will likely be going to the OR in the morning for exploratory laparotomy.  Sugars remained stable.    Review of Systems   Constitutional:  Positive for activity change and appetite change. Negative for fatigue.   Respiratory:  Negative for cough and shortness of breath.    Cardiovascular:  Negative for chest pain and leg swelling.   Gastrointestinal:  Positive for abdominal pain and nausea.   Neurological:  Negative for weakness.   Psychiatric/Behavioral:  Negative for confusion.    All other systems reviewed and are negative.  Objective:     Vital Signs (Most Recent):  Temp: 98.9 °F (37.2 °C) (05/26/22 1600)  Pulse: 93 (05/26/22 1926)  Resp: 20 (05/26/22 1926)  BP: 137/85 (05/26/22 1800)  SpO2: 95 % (05/26/22 1926) Vital Signs (24h Range):  Temp:  [98 °F (36.7 °C)-99 °F (37.2 °C)] 98.9 °F (37.2 °C)  Pulse:  [] 93  Resp:  [13-29] 20  SpO2:  [94 %-100 %] 95 %  BP: ()/(66-88) 137/85     Weight: 70.3 kg (154 lb 15.7 oz)  Body mass index is 23.57 kg/m².    Intake/Output Summary (Last 24 hours) at 5/26/2022 2051  Last data filed at 5/26/2022 1803  Gross per 24 hour   Intake 3451.2 ml   Output 1450 ml   Net 2001.2 ml        Physical Exam  Vitals and nursing note reviewed.   Constitutional:       General: She is not in acute distress.  HENT:      Nose:      Comments: Nasogastric tube inserted  Eyes:      Pupils: Pupils are equal, round, and reactive to light.   Cardiovascular:      Rate and Rhythm: Normal rate and regular rhythm.      Heart sounds: No murmur heard.  Pulmonary:      Effort: Pulmonary effort is normal.      Breath sounds: Normal breath sounds.   Abdominal:      General: There is no distension.      Palpations: Abdomen is soft.      Tenderness: There is abdominal tenderness. There  is guarding.   Skin:     Coloration: Skin is not pale.      Findings: No rash.   Neurological:      Mental Status: She is alert and oriented to person, place, and time.       Significant Labs: All pertinent labs within the past 24 hours have been reviewed.    Significant Imaging: I have reviewed all pertinent imaging results/findings within the past 24 hours.

## 2022-05-27 NOTE — CARE UPDATE
05/26/22 1926   Patient Assessment/Suction   Level of Consciousness (AVPU) alert   Respiratory Effort Unlabored   PRE-TX-O2   O2 Device (Oxygen Therapy) room air   SpO2 95 %   Pulse Oximetry Type Continuous   $ Pulse Oximetry - Multiple Charge Pulse Oximetry - Multiple   Pulse 93   Resp 20

## 2022-05-27 NOTE — TRANSFER OF CARE
"Anesthesia Transfer of Care Note    Patient: Brooks Browning    Procedure(s) Performed: Procedure(s) (LRB):  LAPAROTOMY, EXPLORATORY (N/A)  LYSIS, ADHESIONS (N/A)    Patient location: PACU    Anesthesia Type: general    Transport from OR: Transported from OR on 6-10 L/min O2 by face mask with adequate spontaneous ventilation    Post pain: adequate analgesia    Post assessment: tolerated procedure well and no apparent anesthetic complications    Post vital signs: stable    Level of consciousness: alert, oriented and awake    Nausea/Vomiting: no nausea/vomiting    Complications: none    Transfer of care protocol was followed      Last vitals:   Visit Vitals  /87   Pulse 87   Temp 36.9 °C (98.4 °F) (Oral)   Resp 13   Ht 5' 8" (1.727 m)   Wt 70.3 kg (154 lb 15.7 oz)   SpO2 98%   Breastfeeding No   BMI 23.57 kg/m²     "

## 2022-05-27 NOTE — PLAN OF CARE
Pt is comfortable and not in any pain. NGT set up to low intermittent suction. Solorzano in place. Dressing to abdomen is clean, dry, and intact. Insulin adjsuted according to CBG of 203 and phos gtt continued. VSS. Cleared by anesthesia to transfer back to room.

## 2022-05-27 NOTE — OP NOTE
DATE OF PROCEDURE: 05/27/2022    PREOPERATIVE DIAGNOSIS:  Small-bowel obstruction    POSTOPERATIVE DIAGNOSIS:  Small-bowel obstruction secondary to adhesive band    PROCEDURE:  Exploratory laparotomy, lysis of adhesions    SURGEON: Juve Anne M.D    ASSISTANT: Rick Dunlap MD PGY V   Lakisha Miller     ANESTHESIA:  General    ESTIMATED BLOOD LOSS:  Minimal    SPECIMEN:  None    CONDITION:  Stable    COMPLICATIONS: None    FINDINGS:   1. Transition point located at the mid ileum secondary to adhesive band of omentum   2. Remainder of the bowel appeared normal    INDICATIONS: The patient is a 56-year-old female presented to outside hospital with nausea vomiting.  Found have DKA and small-bowel obstruction.  Transferred to [facility].  Failed conservative management of small-bowel obstruction therefore surgical intervention was offered.    PROCEDURE IN DETAIL:  Patient taken operating room placed in supine position where general endotracheal intubation was achieved.  Abdomen was prepped and draped typical sterile fashion.  She received 2 g of Ancef.  Time-out performed by members of the operative team.  Midline laparotomy incision was made from just above the umbilicus down towards the pelvis.  Bovie was used to dissect through subcutaneous tissue into the linea alba was identified.  This was sharply incised and extended along the length of our incision.  We then sharply dinner and into the peritoneum extended this along the length of our incision.  An José Luis wound protecting device was placed.  The patient has significantly dilated small bowel and clear abdominal fluid but no evidence of perforation.  The small bowel was eviscerated.  We identified a transition point in the mid ileum.  An adhesive band of omentum was stuck down to the pelvis and trapping the bowel.  This was lysed sharply.  This relieved the obstruction.  The small bowel was purplish and injected but did have peristalsis and a  palpable pulse.  Was felt to be viable.  We then ran the small bowel from terminal ileum up to the ligament of Treitz.  No other abnormality was noted.  We did milk some of the fluid towards the NG tube to allow easier reduction of the small bowel into the abdominal cavity.  We then returned the small bowel to the abdominal cavity.  Local anesthetic was injected and a bilateral Gold fashion.  We assured adequate counts.  The fascia was closed using running 0 PDS suture.  The skin was closed with interrupted 3-0 Vicryl sutures and running 4-0 Monocryl subcuticular stitches and glue.  Patient was aroused from sedation, extubated taken to the ICU stable condition have suffered no complications.  All counts correct x2 the case.  I was present scrubbed throughout all operative portions of the case except for skin closure.    DISPO:  ICU

## 2022-05-27 NOTE — PROGRESS NOTES
General Surgery Progress Note    Admit Date: 5/24/2022    Small bowel obstruction, DKA    Hospital Day: 4    SUBJECTIVE:   Repeat x-ray and CT scan shows no evidence of free air however high-grade obstruction is still suspected.  Patient has had no further passage of flatus or stool.    OBJECTIVE:     Vital Signs (Most Recent)  Temp:  [98.4 °F (36.9 °C)-99 °F (37.2 °C)] 98.4 °F (36.9 °C)  Pulse:  [] 84  Resp:  [13-29] 24  SpO2:  [91 %-100 %] 98 %  BP: (104-141)/(73-90) 141/90    I&Os:  I/O last 3 completed shifts:  In: 5983.7 [I.V.:5383.7; IV Piggyback:600]  Out: 2650 [Urine:750; Drains:1900]    Physical Exam:  Gen: NAD, AAOx3  HEENT: Anicteric sclera, NG tube in place with bilious output in the canister  Pulm: unlabored, symmetrical   Abd:  Soft, mild distention, mild tenderness palpation, no rebound, no guarding    Laboratory:  CBC:   Recent Labs   Lab 05/27/22  0331   WBC 3.70*   RBC 4.03   HGB 12.8   HCT 38.2      MCV 95   MCH 31.8*   MCHC 33.5     CMP:   Recent Labs   Lab 05/24/22  1035 05/24/22  1943 05/27/22  0331   *   < > 207*   CALCIUM 9.6   < > 8.5*   ALBUMIN 3.2*  --   --    PROT 7.1  --   --    *   < > 132*   K 3.3*   < > 3.2*   CO2 16*   < > 17*   CL 93*   < > 101   BUN 40*   < > 18   CREATININE 1.4   < > 0.8   ALKPHOS 55  --   --    ALT 11  --   --    AST 13  --   --    BILITOT 0.8  --   --     < > = values in this interval not displayed.     Labs within the past 24 hours have been reviewed.      IMAGING  ABD XRAY 5/25/22 @ 2051  FINDINGS:  Supine position limits evaluation for free intra-abdominal air.  There are multiple loops of significantly distended small bowel again noted throughout the abdomen.  There is lucency outlining the right lateral liver margin and flank as well as the left lateral flank musculature which in retrospect was seen to a similar degree on prior CT  image from 05/24/2022.  Skinfold artifact may be present however extraluminal intraperitoneal  free air is a consideration and can have this appearance.  No other interval detrimental changes are seen.  Feeding tube tip overlies the proximal stomach region.     Impression:     Persistent distended loops of small bowel throughout the abdomen.  Increased lucency over the lateral abdomen and flank is noted raising concern for the possibility of free air on this supine exam.  Additional imaging with at least erect KUB possibly CT is recommended for consideration    ABD XRAY 5/26/22 @ 0724  FINDINGS:  Minimal atelectasis left lung base.  There is a gastric tube with tip in the proximal stomach.  There is a small volume of contrast in the proximal stomach and proximal small bowel.  There are multiple dilated gas-filled small bowel loops.  Right femoral vascular stent.  Bilateral hip arthroplasty.     Impression:     1. No pneumoperitoneum.  2. Continued evidence of small bowel obstruction.  3. Proximally positioned gastric tube.  Advancement by 5-10 cm would place tip closer to the mid stomach.  Correlate for desired position.    CT A/P  FINDINGS:  There is atelectasis noted in the left lung base with elevation of the left hemidiaphragm.     The liver is of normal size contour and CT density without focal defect.  The gallbladder is of normal size without CT evidence of stone.  The pancreas is of normal contour and CT density without edema or mass.  The spleen is small and of normal CT density.     The adrenal glands are not enlarged.  The kidneys are of normal size contour and CT density for a noncontrast study.  Stone or hydronephrosis is not seen.     An NG tube traverses the esophagus to the stomach.  There are numerous dilated fluid-filled small bowel loops with air-fluid levels consistent with small-bowel obstruction.  This is apparently in the ileum since the terminal ileum and cecum are not dilated or fluid-filled.  On this study free air or free fluid in the abdomen is not seen.  An abscess is not  identified.     The patient has had bilateral hip arthroplasties with heavy metal artifact through the lower pelvis.  This obscures the pelvic organs.     Impression:     High-grade small-bowel obstruction in the mid to distal ileum. Free fluid is not identified. NG tube in position.  Prior bilateral hip arthroplasties.  The lower pelvis is obscured by metal artifact from the hip arthroplasties.  Atelectasis at the left lung base with elevation of the left hemidiaphragm.    ASSESSMENT/PLAN:     Patient Active Problem List    Diagnosis Date Noted    DKA (diabetic ketoacidosis) 05/24/2022    Small bowel obstruction 05/24/2022    Hypokalemia 05/24/2022    Pain in joint of left knee 02/26/2018    Degenerative arthritis of left knee 02/05/2018         56 y.o. female     Small bowel obstruction  - still with no significant return of bowel function  - imaging remains consistent with high-grade obstruction  - due to failure to progress I have recommended exploratory laparotomy in the OR today  - procedure was discussed with the patient who is in agreement    Please call with any questions or concerns.    Juve Anne MD  General Surgery  [phone]

## 2022-05-27 NOTE — SUBJECTIVE & OBJECTIVE
Interval History:  Patient seen and examined.  Abdominal pain minimal.  Plan of care discussed with the patient and her daughter at the bedside in detail.  Patient is status post exploratory laparotomy on 05/27 with lysis of adhesions.    Review of Systems   Constitutional:  Positive for activity change and appetite change. Negative for fatigue.   Respiratory:  Negative for cough and shortness of breath.    Cardiovascular:  Negative for chest pain and leg swelling.   Gastrointestinal:  Positive for abdominal pain and nausea.   Neurological:  Negative for weakness.   Psychiatric/Behavioral:  Negative for confusion.    All other systems reviewed and are negative.  Objective:     Vital Signs (Most Recent):  Temp: 97.9 °F (36.6 °C) (05/27/22 1400)  Pulse: 85 (05/27/22 1515)  Resp: 19 (05/27/22 1515)  BP: 114/79 (05/27/22 1500)  SpO2: 97 % (05/27/22 1515) Vital Signs (24h Range):  Temp:  [97.9 °F (36.6 °C)-98.9 °F (37.2 °C)] 97.9 °F (36.6 °C)  Pulse:  [] 85  Resp:  [7-25] 19  SpO2:  [91 %-100 %] 97 %  BP: (100-147)/(61-90) 114/79     Weight: 70.3 kg (154 lb 15.7 oz)  Body mass index is 23.57 kg/m².    Intake/Output Summary (Last 24 hours) at 5/27/2022 1657  Last data filed at 5/27/2022 1400  Gross per 24 hour   Intake 5347.54 ml   Output 4010 ml   Net 1337.54 ml        Physical Exam  Vitals and nursing note reviewed.   Constitutional:       General: She is not in acute distress.  HENT:      Nose:      Comments: Nasogastric tube inserted  Eyes:      Pupils: Pupils are equal, round, and reactive to light.   Cardiovascular:      Rate and Rhythm: Normal rate and regular rhythm.      Heart sounds: No murmur heard.  Pulmonary:      Effort: Pulmonary effort is normal.      Breath sounds: Normal breath sounds.   Abdominal:      General: There is no distension.      Palpations: Abdomen is soft.      Tenderness: There is no guarding.      Comments: Abdominal wound clean dry and intact   Skin:     Coloration: Skin is not  pale.      Findings: No rash.   Neurological:      Mental Status: She is alert and oriented to person, place, and time.       Significant Labs: All pertinent labs within the past 24 hours have been reviewed.    Significant Imaging: I have reviewed all pertinent imaging results/findings within the past 24 hours.

## 2022-05-27 NOTE — NURSING
Pt back from PACU at 1407. VSS. Pt denies any pain at this time - instructed to report any pain as it comes. Midline abdominal incision dressed w/ island border dressing - no drainage noted. Solorzano placed in OR remains in place and patent. NGT placed back to LIS - continued bilious drainage. Insulin gtt running -  in PACU, PACU RN changed dose according to algorithm. Will recheck at 1600 to maintain per q4hour schedule.     PICC team called to place line. At bedside. Consent received from daughter as pt is post-anesthesia.

## 2022-05-27 NOTE — BRIEF OP NOTE
[facility]  Surgery Department  Operative Note    SUMMARY     Date of Procedure: 5/27/2022     Procedure: Procedure(s) (LRB):  LAPAROTOMY, EXPLORATORY (N/A)  LYSIS, ADHESIONS (N/A)     Surgeon(s) and Role:     * Juve Anne Jr., MD - Primary    Assisting Surgeon: Rick Dunlap MD PGY V    Pre-Operative Diagnosis: Small bowel obstruction [K56.609]    Post-Operative Diagnosis: Post-Op Diagnosis Codes:     * Small bowel obstruction [K56.609]    Anesthesia: General    Operative Findings (including complications, if any): adhesive band causing obstruction in pelvis    Description of Technical Procedures: lysis of adhesion    Significant Surgical Tasks Conducted by the Assistant(s), if Applicable:     Estimated Blood Loss (EBL): 5 mL           Implants: * No implants in log *    Specimens:   Specimen (24h ago, onward)            None                  Condition: Good    Disposition: PACU - hemodynamically stable.    Attestation: I was present and scrubbed for the entire procedure.

## 2022-05-28 PROBLEM — E83.42 HYPOMAGNESEMIA: Status: ACTIVE | Noted: 2022-05-28

## 2022-05-28 PROBLEM — E83.51 HYPOCALCEMIA: Status: ACTIVE | Noted: 2022-05-28

## 2022-05-28 PROBLEM — E43 SEVERE MALNUTRITION: Status: ACTIVE | Noted: 2022-05-28

## 2022-05-28 PROBLEM — E83.39 HYPOPHOSPHATASIA: Status: ACTIVE | Noted: 2022-05-28

## 2022-05-28 LAB
ANION GAP SERPL CALC-SCNC: 10 MMOL/L (ref 8–16)
ANION GAP SERPL CALC-SCNC: 11 MMOL/L (ref 8–16)
ANION GAP SERPL CALC-SCNC: 9 MMOL/L (ref 8–16)
ANION GAP SERPL CALC-SCNC: 9 MMOL/L (ref 8–16)
BASOPHILS # BLD AUTO: 0.04 K/UL (ref 0–0.2)
BASOPHILS NFR BLD: 0.7 % (ref 0–1.9)
BUN SERPL-MCNC: 11 MG/DL (ref 6–20)
BUN SERPL-MCNC: 12 MG/DL (ref 6–20)
BUN SERPL-MCNC: 13 MG/DL (ref 6–20)
BUN SERPL-MCNC: 14 MG/DL (ref 6–20)
CA-I BLDV-SCNC: 1.05 MMOL/L (ref 1.06–1.42)
CALCIUM SERPL-MCNC: 6.7 MG/DL (ref 8.7–10.5)
CALCIUM SERPL-MCNC: 7 MG/DL (ref 8.7–10.5)
CALCIUM SERPL-MCNC: 7.1 MG/DL (ref 8.7–10.5)
CALCIUM SERPL-MCNC: 7.2 MG/DL (ref 8.7–10.5)
CHLORIDE SERPL-SCNC: 100 MMOL/L (ref 95–110)
CHLORIDE SERPL-SCNC: 100 MMOL/L (ref 95–110)
CHLORIDE SERPL-SCNC: 98 MMOL/L (ref 95–110)
CHLORIDE SERPL-SCNC: 99 MMOL/L (ref 95–110)
CO2 SERPL-SCNC: 22 MMOL/L (ref 23–29)
CO2 SERPL-SCNC: 25 MMOL/L (ref 23–29)
CO2 SERPL-SCNC: 26 MMOL/L (ref 23–29)
CO2 SERPL-SCNC: 26 MMOL/L (ref 23–29)
CREAT SERPL-MCNC: 0.6 MG/DL (ref 0.5–1.4)
CREAT SERPL-MCNC: 0.6 MG/DL (ref 0.5–1.4)
CREAT SERPL-MCNC: 0.7 MG/DL (ref 0.5–1.4)
CREAT SERPL-MCNC: 0.7 MG/DL (ref 0.5–1.4)
DIFFERENTIAL METHOD: ABNORMAL
EOSINOPHIL # BLD AUTO: 0 K/UL (ref 0–0.5)
EOSINOPHIL NFR BLD: 0.7 % (ref 0–8)
ERYTHROCYTE [DISTWIDTH] IN BLOOD BY AUTOMATED COUNT: 12.4 % (ref 11.5–14.5)
EST. GFR  (AFRICAN AMERICAN): >60 ML/MIN/1.73 M^2
EST. GFR  (NON AFRICAN AMERICAN): >60 ML/MIN/1.73 M^2
GLUCOSE SERPL-MCNC: 147 MG/DL (ref 70–110)
GLUCOSE SERPL-MCNC: 173 MG/DL (ref 70–110)
GLUCOSE SERPL-MCNC: 299 MG/DL (ref 70–110)
GLUCOSE SERPL-MCNC: 309 MG/DL (ref 70–110)
HCT VFR BLD AUTO: 31.5 % (ref 37–48.5)
HGB BLD-MCNC: 11.2 G/DL (ref 12–16)
IMM GRANULOCYTES # BLD AUTO: 0.05 K/UL (ref 0–0.04)
IMM GRANULOCYTES NFR BLD AUTO: 0.8 % (ref 0–0.5)
LYMPHOCYTES # BLD AUTO: 0.8 K/UL (ref 1–4.8)
LYMPHOCYTES NFR BLD: 13.5 % (ref 18–48)
MAGNESIUM SERPL-MCNC: 1.1 MG/DL (ref 1.6–2.6)
MCH RBC QN AUTO: 32.4 PG (ref 27–31)
MCHC RBC AUTO-ENTMCNC: 35.6 G/DL (ref 32–36)
MCV RBC AUTO: 91 FL (ref 82–98)
MONOCYTES # BLD AUTO: 0.4 K/UL (ref 0.3–1)
MONOCYTES NFR BLD: 6.5 % (ref 4–15)
NEUTROPHILS # BLD AUTO: 4.8 K/UL (ref 1.8–7.7)
NEUTROPHILS NFR BLD: 77.8 % (ref 38–73)
NRBC BLD-RTO: 0 /100 WBC
PHOSPHATE SERPL-MCNC: 3.1 MG/DL (ref 2.7–4.5)
PLATELET # BLD AUTO: 184 K/UL (ref 150–450)
PLATELET BLD QL SMEAR: ABNORMAL
PMV BLD AUTO: 9.1 FL (ref 9.2–12.9)
POCT GLUCOSE: 164 MG/DL (ref 70–110)
POCT GLUCOSE: 183 MG/DL (ref 70–110)
POCT GLUCOSE: 231 MG/DL (ref 70–110)
POCT GLUCOSE: 240 MG/DL (ref 70–110)
POCT GLUCOSE: 251 MG/DL (ref 70–110)
POCT GLUCOSE: 310 MG/DL (ref 70–110)
POCT GLUCOSE: 315 MG/DL (ref 70–110)
POCT GLUCOSE: 316 MG/DL (ref 70–110)
POCT GLUCOSE: 329 MG/DL (ref 70–110)
POCT GLUCOSE: 345 MG/DL (ref 70–110)
POTASSIUM SERPL-SCNC: 3.1 MMOL/L (ref 3.5–5.1)
POTASSIUM SERPL-SCNC: 3.4 MMOL/L (ref 3.5–5.1)
POTASSIUM SERPL-SCNC: 3.4 MMOL/L (ref 3.5–5.1)
POTASSIUM SERPL-SCNC: 3.5 MMOL/L (ref 3.5–5.1)
PREALB SERPL-MCNC: 10 MG/DL (ref 20–43)
RBC # BLD AUTO: 3.46 M/UL (ref 4–5.4)
SODIUM SERPL-SCNC: 132 MMOL/L (ref 136–145)
SODIUM SERPL-SCNC: 133 MMOL/L (ref 136–145)
SODIUM SERPL-SCNC: 135 MMOL/L (ref 136–145)
SODIUM SERPL-SCNC: 135 MMOL/L (ref 136–145)
TRIGL SERPL-MCNC: 69 MG/DL (ref 30–150)
WBC # BLD AUTO: 6.14 K/UL (ref 3.9–12.7)

## 2022-05-28 PROCEDURE — 84134 ASSAY OF PREALBUMIN: CPT | Performed by: HOSPITALIST

## 2022-05-28 PROCEDURE — B4185 PARENTERAL SOL 10 GM LIPIDS: HCPCS | Performed by: HOSPITALIST

## 2022-05-28 PROCEDURE — 25000003 PHARM REV CODE 250: Performed by: HOSPITALIST

## 2022-05-28 PROCEDURE — 85025 COMPLETE CBC W/AUTO DIFF WBC: CPT | Performed by: SURGERY

## 2022-05-28 PROCEDURE — 84100 ASSAY OF PHOSPHORUS: CPT | Performed by: SURGERY

## 2022-05-28 PROCEDURE — 84478 ASSAY OF TRIGLYCERIDES: CPT | Performed by: HOSPITALIST

## 2022-05-28 PROCEDURE — A4216 STERILE WATER/SALINE, 10 ML: HCPCS | Performed by: HOSPITALIST

## 2022-05-28 PROCEDURE — 94761 N-INVAS EAR/PLS OXIMETRY MLT: CPT

## 2022-05-28 PROCEDURE — 20000000 HC ICU ROOM

## 2022-05-28 PROCEDURE — 63600175 PHARM REV CODE 636 W HCPCS: Performed by: SURGERY

## 2022-05-28 PROCEDURE — 80048 BASIC METABOLIC PNL TOTAL CA: CPT | Mod: 91 | Performed by: SURGERY

## 2022-05-28 PROCEDURE — 82330 ASSAY OF CALCIUM: CPT | Performed by: NURSE PRACTITIONER

## 2022-05-28 PROCEDURE — 63600175 PHARM REV CODE 636 W HCPCS: Performed by: HOSPITALIST

## 2022-05-28 PROCEDURE — 83735 ASSAY OF MAGNESIUM: CPT | Performed by: SURGERY

## 2022-05-28 PROCEDURE — 25000003 PHARM REV CODE 250: Performed by: SURGERY

## 2022-05-28 RX ADMIN — POTASSIUM CHLORIDE 20 MEQ: 200 INJECTION, SOLUTION INTRAVENOUS at 08:05

## 2022-05-28 RX ADMIN — POTASSIUM CHLORIDE 20 MEQ: 200 INJECTION, SOLUTION INTRAVENOUS at 05:05

## 2022-05-28 RX ADMIN — MAGNESIUM SULFATE 2 G: 2 INJECTION INTRAVENOUS at 05:05

## 2022-05-28 RX ADMIN — LEUCINE, PHENYLALANINE, LYSINE, METHIONINE, ISOLEUCINE, VALINE, HISTIDINE, THREONINE, TRYPTOPHAN, ALANINE, GLYCINE, ARGININE, PROLINE, SERINE, TYROSINE, SODIUM ACETATE, DIBASIC POTASSIUM PHOSPHATE, MAGNESIUM CHLORIDE, SODIUM CHLORIDE, CALCIUM CHLORIDE, DEXTROSE
365; 280; 290; 200; 300; 290; 240; 210; 90; 1035; 515; 575; 340; 250; 20; 340; 261; 51; 59; 33; 15 INJECTION INTRAVENOUS at 08:05

## 2022-05-28 RX ADMIN — HYDROMORPHONE HYDROCHLORIDE 0.5 MG: 1 INJECTION, SOLUTION INTRAMUSCULAR; INTRAVENOUS; SUBCUTANEOUS at 04:05

## 2022-05-28 RX ADMIN — SODIUM CHLORIDE, PRESERVATIVE FREE 10 ML: 5 INJECTION INTRAVENOUS at 05:05

## 2022-05-28 RX ADMIN — HYDROMORPHONE HYDROCHLORIDE 0.5 MG: 1 INJECTION, SOLUTION INTRAMUSCULAR; INTRAVENOUS; SUBCUTANEOUS at 10:05

## 2022-05-28 RX ADMIN — MUPIROCIN: 20 OINTMENT TOPICAL at 08:05

## 2022-05-28 RX ADMIN — SODIUM BICARBONATE: 84 INJECTION, SOLUTION INTRAVENOUS at 01:05

## 2022-05-28 RX ADMIN — POTASSIUM CHLORIDE 20 MEQ: 200 INJECTION, SOLUTION INTRAVENOUS at 10:05

## 2022-05-28 RX ADMIN — HYDROMORPHONE HYDROCHLORIDE 0.5 MG: 1 INJECTION, SOLUTION INTRAMUSCULAR; INTRAVENOUS; SUBCUTANEOUS at 05:05

## 2022-05-28 RX ADMIN — HYDROMORPHONE HYDROCHLORIDE 0.5 MG: 1 INJECTION, SOLUTION INTRAMUSCULAR; INTRAVENOUS; SUBCUTANEOUS at 12:05

## 2022-05-28 RX ADMIN — CALCIUM GLUCONATE 1 G: 20 INJECTION, SOLUTION INTRAVENOUS at 04:05

## 2022-05-28 RX ADMIN — MAGNESIUM SULFATE 2 G: 2 INJECTION INTRAVENOUS at 07:05

## 2022-05-28 RX ADMIN — POTASSIUM CHLORIDE 20 MEQ: 200 INJECTION, SOLUTION INTRAVENOUS at 07:05

## 2022-05-28 RX ADMIN — SODIUM CHLORIDE, PRESERVATIVE FREE 10 ML: 5 INJECTION INTRAVENOUS at 01:05

## 2022-05-28 RX ADMIN — SOYBEAN OIL 250 ML: 20 INJECTION, SOLUTION INTRAVENOUS at 09:05

## 2022-05-28 RX ADMIN — SODIUM BICARBONATE: 84 INJECTION, SOLUTION INTRAVENOUS at 05:05

## 2022-05-28 RX ADMIN — SODIUM BICARBONATE: 84 INJECTION, SOLUTION INTRAVENOUS at 09:05

## 2022-05-28 NOTE — ASSESSMENT & PLAN NOTE
Nutrition consulted. Most recent weight and BMI monitored-     Patient is severely malnourished with Body mass index is 25.68 kg/m². and prealbumin  Recent Labs     05/28/22  0401   PREALBUMIN 10*     Unable to accommodate by enteral nutrition. Will continue nutrition consult, calorie count and supplemental nutrition by TPN.    Measurements:  Wt Readings from Last 1 Encounters:   05/28/22 76.6 kg (168 lb 14 oz)   Body mass index is 25.68 kg/m².    Recommendations: Recommendation/Intervention: 1.) When appropriate  Goals: 1.) diet will advance within 48 hrs    Patient has been screened and assessed by RD. RD will follow patient.

## 2022-05-28 NOTE — ASSESSMENT & PLAN NOTE
Patient's FSGs are uncontrolled due to hyperglycemia on current medication regimen.  Last A1c reviewed-   Lab Results   Component Value Date    HGBA1C 7.0 (H) 05/24/2022     Most recent fingerstick glucose reviewed-   Recent Labs   Lab 05/28/22  0400 05/28/22  0559 05/28/22  0829 05/28/22  1232   POCTGLUCOSE 315* 310* 316* 164*     Maintain anti-hyperglycemic dose as follows-   Antihyperglycemics (From admission, onward)            Start     Stop Route Frequency Ordered    05/24/22 2015  insulin regular 1 Units/mL in sodium chloride 0.9% 100 mL infusion        Question Answer Comment   Insulin Rate Adjustment (DO NOT MODIFY ANSWER) \\[email or web address]    Enter initial dose from Infusion Protocol Chart (Units/hr): 1        -- IV Continuous 05/24/22 1912        Hold Oral hypoglycemics while patient is in the hospital.  Continue q.4 electrolytes and q.4 hour POCT glucose and monitor closely in ICU. Wean off of drip once patient is taking enteral intake reliably.

## 2022-05-28 NOTE — PROGRESS NOTES
[facility] Hospital Medicine  Progress Note    Patient Name: Brooks Browning  MRN: [id number]  Patient Class: IP- Inpatient   Admission Date: 2022  Length of Stay: 4 days  Attending Physician: Shelton Antoine MD  Primary Care Provider: Sofía Murcia MD        Subjective:     Principal Problem:DKA (diabetic ketoacidosis)        HPI:  Patient is a 56-year-old  female with a past medical history significant for type 2 diabetes primarily dependent on metformin, hypertension, and multiple joint replacements who presents to the hospital with approximately 1 week of abdominal pain and vomiting.  Patient states that she initially started having abdominal pain approximately 6 days prior to admission with intermittent pain worsening to constant pain with bilious vomiting.  Patient denies any diarrhea or other GI complaints.  Denies any fever or other systemic symptoms.  Patient states that she has never had any episode like this previously.  She initially thought it was a stomach flu , but as it worsened, she presented to the emergency department.  At the outside emergency department, the patient was found to be in diabetic ketoacidosis with elevated beta hydroxybutyrate as well as metabolic acidosis and she was also found to have a secondary small-bowel obstruction.  Patient does have a history of  section in the past.  Patient was transferred [facility] to [facility] for treatment of her various medical issues.      Overview/Hospital Course:  No notes on file    Interval History:  Patient seen and examined.  Abdominal pain minimal.  Plan of care discussed with the patient and her daughter at the bedside in detail.  Patient is status post exploratory laparotomy on  with lysis of adhesions.    Review of Systems   Constitutional:  Positive for activity change and appetite change. Negative for fatigue.   Respiratory:  Negative for cough and  shortness of breath.    Cardiovascular:  Negative for chest pain and leg swelling.   Gastrointestinal:  Positive for abdominal pain and nausea.   Neurological:  Negative for weakness.   Psychiatric/Behavioral:  Negative for confusion.    All other systems reviewed and are negative.  Objective:     Vital Signs (Most Recent):  Temp: 97.9 °F (36.6 °C) (05/27/22 1400)  Pulse: 85 (05/27/22 1515)  Resp: 19 (05/27/22 1515)  BP: 114/79 (05/27/22 1500)  SpO2: 97 % (05/27/22 1515) Vital Signs (24h Range):  Temp:  [97.9 °F (36.6 °C)-98.9 °F (37.2 °C)] 97.9 °F (36.6 °C)  Pulse:  [] 85  Resp:  [7-25] 19  SpO2:  [91 %-100 %] 97 %  BP: (100-147)/(61-90) 114/79     Weight: 70.3 kg (154 lb 15.7 oz)  Body mass index is 23.57 kg/m².    Intake/Output Summary (Last 24 hours) at 5/27/2022 1657  Last data filed at 5/27/2022 1400  Gross per 24 hour   Intake 5347.54 ml   Output 4010 ml   Net 1337.54 ml        Physical Exam  Vitals and nursing note reviewed.   Constitutional:       General: She is not in acute distress.  HENT:      Nose:      Comments: Nasogastric tube inserted  Eyes:      Pupils: Pupils are equal, round, and reactive to light.   Cardiovascular:      Rate and Rhythm: Normal rate and regular rhythm.      Heart sounds: No murmur heard.  Pulmonary:      Effort: Pulmonary effort is normal.      Breath sounds: Normal breath sounds.   Abdominal:      General: There is no distension.      Palpations: Abdomen is soft.      Tenderness: There is no guarding.      Comments: Abdominal wound clean dry and intact   Skin:     Coloration: Skin is not pale.      Findings: No rash.   Neurological:      Mental Status: She is alert and oriented to person, place, and time.       Significant Labs: All pertinent labs within the past 24 hours have been reviewed.    Significant Imaging: I have reviewed all pertinent imaging results/findings within the past 24 hours.      Assessment/Plan:      * DKA (diabetic ketoacidosis)  Patient's FSGs are  uncontrolled due to hyperglycemia on current medication regimen.  Last A1c reviewed-   Lab Results   Component Value Date    HGBA1C 7.0 (H) 05/24/2022     Most recent fingerstick glucose reviewed-   Recent Labs   Lab 05/28/22  0400 05/28/22  0559 05/28/22  0829 05/28/22  1232   POCTGLUCOSE 315* 310* 316* 164*     Maintain anti-hyperglycemic dose as follows-   Antihyperglycemics (From admission, onward)            Start     Stop Route Frequency Ordered    05/24/22 2015  insulin regular 1 Units/mL in sodium chloride 0.9% 100 mL infusion        Question Answer Comment   Insulin Rate Adjustment (DO NOT MODIFY ANSWER) \\[email or web address]    Enter initial dose from Infusion Protocol Chart (Units/hr): 1        -- IV Continuous 05/24/22 1912        Hold Oral hypoglycemics while patient is in the hospital.  Continue q.4 electrolytes and q.4 hour POCT glucose and monitor closely in ICU. Wean off of drip once patient is taking enteral intake reliably.        Hypocalcemia  Patient with noted electrolyte deficiencies with Hypo-Calcemia. Latest electrolytes have been reviewed and values are   Calcium   Date Value Ref Range Status   05/28/2022 7.0 (L) 8.7 - 10.5 mg/dL Final   . Will continue to monitor electrolytes closely. Will replace the affected electrolytes and repeat labs to be done after interventions completed.            Hypophosphatasia  Patient with noted electrolyte deficiencies with Hypo-Phosphatemia. Latest electrolytes have been reviewed and values are   Phosphorus   Date Value Ref Range Status   05/28/2022 3.1 2.7 - 4.5 mg/dL Final   . Will continue to monitor electrolytes closely. Will replace the affected electrolytes and repeat labs to be done after interventions completed.            Hypomagnesemia  Magnesium reviewed- Recent Labs   Lab 05/28/22  0401   MG 1.1*    Will replace electrolytes and continue to monitor closely.           Severe  malnutrition  Nutrition consulted. Most recent weight and BMI monitored-     Patient is severely malnourished with Body mass index is 25.68 kg/m². and prealbumin  Recent Labs     05/28/22  0401   PREALBUMIN 10*     Unable to accommodate by enteral nutrition. Will continue nutrition consult, calorie count and supplemental nutrition by TPN.    Measurements:  Wt Readings from Last 1 Encounters:   05/28/22 76.6 kg (168 lb 14 oz)   Body mass index is 25.68 kg/m².    Recommendations: Recommendation/Intervention: 1.) When appropriate  Goals: 1.) diet will advance within 48 hrs    Patient has been screened and assessed by RD. RD will follow patient.      Hypokalemia  Patient has hypokalemia which is currently uncontrolled. Last electrolytes reviewed-   Recent Labs   Lab 05/28/22  0401 05/28/22  0831 05/28/22  1235   K 3.4* 3.5 3.4*   . Will replace potassium and monitor electrolytes closely.         Small bowel obstruction  Etiology likely related to adhesions secondary to previous surgery.  Continue NG tube placement.  General surgery consult in place.  P.r.n. medication for pain and nausea.  Patient is status post exploratory laparotomy and lysis of adhesions on 05/27.  Will defer advancement of diet to General surgery.        VTE Risk Mitigation (From admission, onward)         Ordered     Place sequential compression device  Until discontinued         05/24/22 1912     IP VTE LOW RISK PATIENT  Once         05/24/22 1912                Discharge Planning   STEFANIE: 6/1/2022     Code Status: Full Code   Is the patient medically ready for discharge?:     Reason for patient still in hospital (select all that apply): Treatment  Discharge Plan A: Home            Critical care time spent on the evaluation and treatment of severe organ dysfunction, review of pertinent labs and imaging studies, discussions with consulting providers and discussions with patient/family: 37 minutes.      Shelton Antoine MD  Department of Hospital  Medicine   [facility]

## 2022-05-28 NOTE — ASSESSMENT & PLAN NOTE
Etiology likely related to adhesions secondary to previous surgery.  Continue NG tube placement.  General surgery consult in place.  P.r.n. medication for pain and nausea.  Patient is status post exploratory laparotomy and lysis of adhesions on 05/27.  Will defer advancement of diet to General surgery.

## 2022-05-28 NOTE — CARE UPDATE
05/28/22 0835   Patient Assessment/Suction   Level of Consciousness (AVPU) alert   PRE-TX-O2   O2 Device (Oxygen Therapy) room air   SpO2 100 %   Pulse Oximetry Type Continuous   $ Pulse Oximetry - Multiple Charge Pulse Oximetry - Multiple   Pulse 85   Resp (!) 21   /71      eMERGENCY dEPARTMENT eNCOUnter    Attending note    I cared for and evaluated the patient in conjunction with the ED Advanced Practice Provider    CRITICAL CARE NOTE:  There was a high probability of clinically significant life-threatening deterioration of the patient's condition requiring my urgent intervention. Total critical care time is 35 minutes  This includes vital sign monitoring, pulse oximetry monitoring, telemetry monitoring, clinical response to the IV medications, reviewing the nursing notes, consultation time, dictation/documetation time. (This time excludes time spent performing procedures). HPI/Physical Exam/Medical Decision Making  Mely Rico is a 77 y.o. male here for evaluation of dizziness and confusion. The patient states that he woke up this morning feeling like his usual self. He took a nap around noon and when he woke up from his nap he was very disoriented with dizziness and confusion. He attempted to call his family members by telephone but was having difficulty remembering the numbers and could not figure out how to work the phone. He was having difficulty with ambulation and was walking into walls. He otherwise denies fevers, chills, neck stiffness, headache, slurred speech, motor weakness or any other complaints. Of note, the patient takes Plavix, but otherwise denies any blood thinners. Please see MAGO note for further details. Vitals:   ED Triage Vitals [03/02/20 1720]   Enc Vitals Group      BP (!) 161/89      Pulse 75      Resp 18      Temp 98.5 °F (36.9 °C)      Temp src       SpO2 92 %      Weight 225 lb (102.1 kg)      Height 5' 11\" (1.803 m)      Head Circumference       Peak Flow       Pain Score       Pain Loc       Pain Edu? Excl. in [address]?         EKG Interpretation  Interpreted by me  Compared to 12/13/19  Rhythm: normal sinus   Rate: normal 83  Axis: normal  Ectopy: none  Conduction: normal  ST Segments: no acute change  T Waves: no acute change  Clinical Impression: normal sinus rhythm, no acute change    Cardiac Monitor Strip Interpretation  Interpreted by me  Monitor strip interpreted for greater than 10 seconds  Rhythm: normal sinus   Rate: normal  Ectopy: none  ST Segments: normal      The patient was initially evaluated by the MAGO who called a stroke alert on his evaluation. The patient was immediately taken for CT scan of the brain. I examined the patient when he returned to the room. On exam, the patient is afebrile and nontoxic appearing. He is hypertensive with a known history of hypertension and is asymptomatic. He is otherwise hemodynamically stable. Pupils are equal round and reactive to light. He is unable to move his eyes to the left. Extraocular eye movements are otherwise intact. He is otherwise neurologically intact. Airway is patent. Neck is supple. Heart sounds have a regular rate and rhythm. Lungs are clear to auscultation bilaterally. The patient's abdomen is soft, non-tender and non-distended with no peritoneal signs. Extremities are warm, pink, and well perfused. EKG shows a normal sinus rhythm with no ST elevation or depression. Labs are obtained and are significant for a troponin of 0.011 with no other clinically significant lab abnormalities. CT head was obtained and shows a right MCA territory infarction with acute partial hemorrhage, somewhat bilobed in the left parietal lobe and smaller posterior temporal lobe. The patient is anticoagulated on Plavix. He was treated with DDAVP. His blood pressure is elevated and so he was started on a Cardene drip with improvement. We discussed the results with the patient and recommended transfer to a facility with neurosurgical capability. The patient and family are agreeable with transfer to [place]. The MAGO arranged the transfer. Please see his notes for details. The patient was in stable condition at the time of transport departure.       All diagnostic, treatment, and disposition

## 2022-05-28 NOTE — ASSESSMENT & PLAN NOTE
Patient has hypokalemia which is currently uncontrolled. Last electrolytes reviewed-   Recent Labs   Lab 05/28/22  0401 05/28/22  0831 05/28/22  1235   K 3.4* 3.5 3.4*   . Will replace potassium and monitor electrolytes closely.

## 2022-05-28 NOTE — ASSESSMENT & PLAN NOTE
Magnesium reviewed- Recent Labs   Lab 05/28/22  0401   MG 1.1*    Will replace electrolytes and continue to monitor closely.

## 2022-05-28 NOTE — CARE UPDATE
05/27/22 1919   Patient Assessment/Suction   Level of Consciousness (AVPU) alert   Respiratory Effort Unlabored   PRE-TX-O2   O2 Device (Oxygen Therapy) room air   SpO2 98 %   Pulse Oximetry Type Continuous   $ Pulse Oximetry - Multiple Charge Pulse Oximetry - Multiple   Pulse 91   Resp 20

## 2022-05-28 NOTE — ASSESSMENT & PLAN NOTE
Patient with noted electrolyte deficiencies with Hypo-Phosphatemia. Latest electrolytes have been reviewed and values are   Phosphorus   Date Value Ref Range Status   05/28/2022 3.1 2.7 - 4.5 mg/dL Final   . Will continue to monitor electrolytes closely. Will replace the affected electrolytes and repeat labs to be done after interventions completed.

## 2022-05-28 NOTE — SUBJECTIVE & OBJECTIVE
Interval History: hiccups, no n/v, ngt still in place, no flatus    Medications:  Continuous Infusions:   Amino acid 5% - dextrose 15% (CLINIMIX-E) solution with additives.  CENTRAL LINE ONLY (1395 mOsm/L) 50 mL/hr at 05/27/22 1944    Amino acid 5% - dextrose 15% (CLINIMIX-E) solution with additives.  CENTRAL LINE ONLY (1395 mOsm/L)      insulin regular 1 units/mL infusion orderable (DKA) 1.9 Units/hr (05/28/22 0602)    sodium bicarbonate drip 150 mL/hr at 05/28/22 0550     Scheduled Meds:   fat emulsion 20%  250 mL Intravenous Daily    mupirocin   Nasal BID    sodium chloride 0.9%  10 mL Intravenous Q6H     PRN Meds:acetaminophen, calcium gluconate IVPB, calcium gluconate IVPB, calcium gluconate IVPB, dextrose 10%, dextrose 10%, HYDROmorphone, magnesium sulfate IVPB, magnesium sulfate IVPB, potassium chloride in water **AND** potassium chloride in water **AND** potassium chloride in water, prochlorperazine, sodium chloride 0.9%, Flushing PICC Protocol **AND** sodium chloride 0.9% **AND** sodium chloride 0.9%, sodium phosphate IVPB, sodium phosphate IVPB, sodium phosphate IVPB, zolpidem     Review of patient's allergies indicates:   Allergen Reactions    Morphine Rash     Objective:     Vital Signs (Most Recent):  Temp: 98.6 °F (37 °C) (05/28/22 1003)  Pulse: 90 (05/28/22 1145)  Resp: 19 (05/28/22 1145)  BP: 123/69 (05/28/22 1130)  SpO2: 98 % (05/28/22 1145) Vital Signs (24h Range):  Temp:  [97.7 °F (36.5 °C)-98.6 °F (37 °C)] 98.6 °F (37 °C)  Pulse:  [] 90  Resp:  [7-35] 19  SpO2:  [95 %-100 %] 98 %  BP: (100-126)/(61-83) 123/69     Weight: 76.6 kg (168 lb 14 oz)  Body mass index is 25.68 kg/m².    Intake/Output - Last 3 Shifts         05/26 0700 05/27 0659 05/27 0700 05/28 0659 05/28 0700 05/29 0659    I.V. (mL/kg) 3843.6 (54.7) 3396.8 (44.3) 548 (7.2)    NG/GT       IV Piggyback 578.3 1783.3 157.5    TPN  724.4 219.8    Total Intake(mL/kg) 4422 (62.9) 5904.5 (77.1) 925.4 (12.1)    Urine (mL/kg/hr) 750  (0.4) 1557 (0.8) 300 (0.8)    Drains 1800 1200     Other  1250     Stool 0 0 0    Blood  5     Total Output 2550 4012 300    Net +1872 +1892.5 +625.4           Urine Occurrence 1 x      Stool Occurrence 0 x 0 x 0 x            Physical Exam  Abdominal:      General: Abdomen is flat. There is no distension.      Palpations: Abdomen is soft.      Tenderness: There is no abdominal tenderness. There is no guarding.       Significant Labs:  I have reviewed all pertinent lab results within the past 24 hours.  CBC:   Recent Labs   Lab 05/28/22  0401   WBC 6.14   RBC 3.46*   HGB 11.2*   HCT 31.5*      MCV 91   MCH 32.4*   MCHC 35.6     BMP:   Recent Labs   Lab 05/28/22  0401 05/28/22  0831   * 299*   * 133*   K 3.4* 3.5   CL 99 98   CO2 22* 25   BUN 14 13   CREATININE 0.7 0.7   CALCIUM 7.2* 7.1*   MG 1.1*  --        Significant Diagnostics:  I have reviewed all pertinent imaging results/findings within the past 24 hours.

## 2022-05-28 NOTE — ASSESSMENT & PLAN NOTE
1 Day Post-Op ex lap and adhesiolysis for sbo    Doing well  Would continue ngt for now  Will give cl diet for comfort- keep ngt in, make sure she is sitting up   Ok to use gi tract for oral meds, can clamp ngt for 30 minutes prn

## 2022-05-28 NOTE — HOSPITAL COURSE
Brooks Browning is a 56 year old female with a past medical history of HTN, HLD, DM, and anemia who was admitted to the hospital with small-bowel obstruction (history of ) and diabetic ketoacidosis. She was started on insulin drip per DKA protocol upon admission and was evaluated by General Surgery. A nasogastric tube was inserted, yet she continued to have significant output through the NG tube, and a repeat contrast study noted no evidence of progression of contrast through the small bowel. The patient subsequently underwent exploratory laparotomy on  with lysis of adhesions. She was started on TPN. Her DKA has resolved. She was started on a clear liquid diet per Surgery. She was moved out of the ICU 2022. The NG tube was removed  and her diet was advanced to diabetic as she began to pass flatus and have bowel movements. She was able to tolerate PO and was discharged 2022. She will follow up with her PCP and General Surgery. Of note, her course was complicated by hypomagnesemia and hypokalemia likely secondary to HCTZ use. Scheduled K and Mg repletion were prescribed on discharge.

## 2022-05-28 NOTE — PLAN OF CARE
Plan of care reviewed with pt. Pt A&Ox4 and able to verbalize understanding. Moves all four extremities independently with minimal weakness. Pt complains of abdominal incision pain; relieved by dilaudid. AF/VSS. Bicarb gtt at 150 mL/hr, TPN at 50 mL/hr, and insulin gtt at 1.9 U/hr. Accuchecks done q2hrs per order and insulin drip titrated per algorithm. BMP labs to be continued q4hrs. 0800 and 0000 BMP labs held d/t KCl infusion running. Left nare NGT in place at low, intermittent suction. Solorzano in place with adequate hourly urine output. PICC line in place with good blood return. Safety maintained.     Problem: Adult Inpatient Plan of Care  Goal: Plan of Care Review  Outcome: Ongoing, Progressing  Goal: Patient-Specific Goal (Individualized)  Outcome: Ongoing, Progressing  Goal: Absence of Hospital-Acquired Illness or Injury  Outcome: Ongoing, Progressing  Goal: Optimal Comfort and Wellbeing  Outcome: Ongoing, Progressing  Goal: Readiness for Transition of Care  Outcome: Ongoing, Progressing

## 2022-05-28 NOTE — PLAN OF CARE
POC reviewed with pt. Pt AAOx4. NG in remained to low intermittent suction. Diet advanced to clear liquids by Mikaela LARES and pt tolerating well. Pt c/o periodic pain from abdomen and managed with prn pain medication. Calcium of 6.7 treated with calcium gluconate based on ionized calcium. Solorzano removed based on nurse driven protocol. BG monitored and insulin gtt adjusted according to algorithm. MD encouraging pt to sit in chair periodically and walk to commode. K and MAG finished in am. Afebrile. Fall and safety precautions in place. Comfort and wellbeing maintained.     Step 1 - Admission - Current ([facility] - IP DIABETIC KETOACIDOSIS (DKA/HHS) - [facility])  MD: Glucose less than 200; MD should discontinue insulin drip  Outcome: Met  RN:  BMP drawn Q2-Q6  Outcome: Met  RN:  Patient free from episodes of hypoglycemia (documented since admission)  Outcome: Met     Problem: Adult Inpatient Plan of Care  Goal: Plan of Care Review  Outcome: Ongoing, Progressing  Goal: Patient-Specific Goal (Individualized)  Outcome: Ongoing, Progressing  Goal: Optimal Comfort and Wellbeing  Outcome: Ongoing, Progressing     Problem: Diabetes Comorbidity  Goal: Blood Glucose Level Within Targeted Range  Outcome: Ongoing, Progressing     Problem: Diabetic Ketoacidosis  Goal: Fluid and Electrolyte Balance with Absence of Ketosis  Outcome: Ongoing, Progressing     Problem: Infection  Goal: Absence of Infection Signs and Symptoms  Outcome: Ongoing, Progressing

## 2022-05-28 NOTE — ASSESSMENT & PLAN NOTE
Patient with noted electrolyte deficiencies with Hypo-Calcemia. Latest electrolytes have been reviewed and values are   Calcium   Date Value Ref Range Status   05/28/2022 7.0 (L) 8.7 - 10.5 mg/dL Final   . Will continue to monitor electrolytes closely. Will replace the affected electrolytes and repeat labs to be done after interventions completed.

## 2022-05-28 NOTE — PROGRESS NOTES
[facility] Surgery  Progress Note    Subjective:     History of Present Illness:  No notes on file    Post-Op Info:  Procedure(s) (LRB):  LAPAROTOMY, EXPLORATORY (N/A)  LYSIS, ADHESIONS (N/A)   1 Day Post-Op     Interval History: hiccups, no n/v, ngt still in place, no flatus    Medications:  Continuous Infusions:   Amino acid 5% - dextrose 15% (CLINIMIX-E) solution with additives.  CENTRAL LINE ONLY (1395 mOsm/L) 50 mL/hr at 05/27/22 1944    Amino acid 5% - dextrose 15% (CLINIMIX-E) solution with additives.  CENTRAL LINE ONLY (1395 mOsm/L)      insulin regular 1 units/mL infusion orderable (DKA) 1.9 Units/hr (05/28/22 0602)    sodium bicarbonate drip 150 mL/hr at 05/28/22 0550     Scheduled Meds:   fat emulsion 20%  250 mL Intravenous Daily    mupirocin   Nasal BID    sodium chloride 0.9%  10 mL Intravenous Q6H     PRN Meds:acetaminophen, calcium gluconate IVPB, calcium gluconate IVPB, calcium gluconate IVPB, dextrose 10%, dextrose 10%, HYDROmorphone, magnesium sulfate IVPB, magnesium sulfate IVPB, potassium chloride in water **AND** potassium chloride in water **AND** potassium chloride in water, prochlorperazine, sodium chloride 0.9%, Flushing PICC Protocol **AND** sodium chloride 0.9% **AND** sodium chloride 0.9%, sodium phosphate IVPB, sodium phosphate IVPB, sodium phosphate IVPB, zolpidem     Review of patient's allergies indicates:   Allergen Reactions    Morphine Rash     Objective:     Vital Signs (Most Recent):  Temp: 98.6 °F (37 °C) (05/28/22 1003)  Pulse: 90 (05/28/22 1145)  Resp: 19 (05/28/22 1145)  BP: 123/69 (05/28/22 1130)  SpO2: 98 % (05/28/22 1145) Vital Signs (24h Range):  Temp:  [97.7 °F (36.5 °C)-98.6 °F (37 °C)] 98.6 °F (37 °C)  Pulse:  [] 90  Resp:  [7-35] 19  SpO2:  [95 %-100 %] 98 %  BP: (100-126)/(61-83) 123/69     Weight: 76.6 kg (168 lb 14 oz)  Body mass index is 25.68 kg/m².    Intake/Output - Last 3 Shifts         05/26 0700  05/27 0659 05/27  0700  05/28 0659 05/28 0700  05/29 0659    I.V. (mL/kg) 3843.6 (54.7) 3396.8 (44.3) 548 (7.2)    NG/GT       IV Piggyback 578.3 1783.3 157.5    TPN  724.4 219.8    Total Intake(mL/kg) 4422 (62.9) 5904.5 (77.1) 925.4 (12.1)    Urine (mL/kg/hr) 750 (0.4) 1557 (0.8) 300 (0.8)    Drains 1800 1200     Other  1250     Stool 0 0 0    Blood  5     Total Output 2550 4012 300    Net +1872 +1892.5 +625.4           Urine Occurrence 1 x      Stool Occurrence 0 x 0 x 0 x            Physical Exam  Abdominal:      General: Abdomen is flat. There is no distension.      Palpations: Abdomen is soft.      Tenderness: There is no abdominal tenderness. There is no guarding.       Significant Labs:  I have reviewed all pertinent lab results within the past 24 hours.  CBC:   Recent Labs   Lab 05/28/22  0401   WBC 6.14   RBC 3.46*   HGB 11.2*   HCT 31.5*      MCV 91   MCH 32.4*   MCHC 35.6     BMP:   Recent Labs   Lab 05/28/22  0401 05/28/22  0831   * 299*   * 133*   K 3.4* 3.5   CL 99 98   CO2 22* 25   BUN 14 13   CREATININE 0.7 0.7   CALCIUM 7.2* 7.1*   MG 1.1*  --        Significant Diagnostics:  I have reviewed all pertinent imaging results/findings within the past 24 hours.    Assessment/Plan:     Small bowel obstruction  1 Day Post-Op ex lap and adhesiolysis for sbo    Doing well  Would continue ngt for now  Will give cl diet for comfort- keep ngt in, make sure she is sitting up   Ok to use gi tract for oral meds, can clamp ngt for 30 minutes prn            Nora Al MD  General Surgery  [facility]

## 2022-05-29 PROBLEM — E78.5 HLD (HYPERLIPIDEMIA): Status: ACTIVE | Noted: 2022-05-29

## 2022-05-29 PROBLEM — E87.6 HYPOKALEMIA: Status: RESOLVED | Noted: 2022-05-24 | Resolved: 2022-05-29

## 2022-05-29 PROBLEM — E11.10 DKA (DIABETIC KETOACIDOSIS): Status: RESOLVED | Noted: 2022-05-24 | Resolved: 2022-05-29

## 2022-05-29 PROBLEM — Z80.0 FAMILY HISTORY OF COLON CANCER: Status: ACTIVE | Noted: 2017-10-05

## 2022-05-29 PROBLEM — E11.9 DM (DIABETES MELLITUS): Status: ACTIVE | Noted: 2022-05-29

## 2022-05-29 PROBLEM — K63.5 POLYP OF COLON: Status: ACTIVE | Noted: 2020-12-15

## 2022-05-29 PROBLEM — I10 HTN (HYPERTENSION): Status: ACTIVE | Noted: 2022-05-29

## 2022-05-29 LAB
ANION GAP SERPL CALC-SCNC: 10 MMOL/L (ref 8–16)
ANION GAP SERPL CALC-SCNC: 8 MMOL/L (ref 8–16)
BASOPHILS NFR BLD: 0 % (ref 0–1.9)
BUN SERPL-MCNC: 10 MG/DL (ref 6–20)
BUN SERPL-MCNC: 10 MG/DL (ref 6–20)
CALCIUM SERPL-MCNC: 7.5 MG/DL (ref 8.7–10.5)
CALCIUM SERPL-MCNC: 7.7 MG/DL (ref 8.7–10.5)
CHLORIDE SERPL-SCNC: 95 MMOL/L (ref 95–110)
CHLORIDE SERPL-SCNC: 96 MMOL/L (ref 95–110)
CO2 SERPL-SCNC: 32 MMOL/L (ref 23–29)
CO2 SERPL-SCNC: 32 MMOL/L (ref 23–29)
CREAT SERPL-MCNC: 0.6 MG/DL (ref 0.5–1.4)
CREAT SERPL-MCNC: 0.6 MG/DL (ref 0.5–1.4)
DIFFERENTIAL METHOD: ABNORMAL
EOSINOPHIL NFR BLD: 1 % (ref 0–8)
ERYTHROCYTE [DISTWIDTH] IN BLOOD BY AUTOMATED COUNT: 12.4 % (ref 11.5–14.5)
EST. GFR  (AFRICAN AMERICAN): >60 ML/MIN/1.73 M^2
EST. GFR  (AFRICAN AMERICAN): >60 ML/MIN/1.73 M^2
EST. GFR  (NON AFRICAN AMERICAN): >60 ML/MIN/1.73 M^2
EST. GFR  (NON AFRICAN AMERICAN): >60 ML/MIN/1.73 M^2
GLUCOSE SERPL-MCNC: 191 MG/DL (ref 70–110)
GLUCOSE SERPL-MCNC: 230 MG/DL (ref 70–110)
HCT VFR BLD AUTO: 29.7 % (ref 37–48.5)
HGB BLD-MCNC: 10.6 G/DL (ref 12–16)
IMM GRANULOCYTES # BLD AUTO: ABNORMAL K/UL
IMM GRANULOCYTES NFR BLD AUTO: ABNORMAL %
LYMPHOCYTES NFR BLD: 26 % (ref 18–48)
MAGNESIUM SERPL-MCNC: 1.5 MG/DL (ref 1.6–2.6)
MCH RBC QN AUTO: 32.9 PG (ref 27–31)
MCHC RBC AUTO-ENTMCNC: 35.7 G/DL (ref 32–36)
MCV RBC AUTO: 92 FL (ref 82–98)
MONOCYTES NFR BLD: 7 % (ref 4–15)
NEUTROPHILS NFR BLD: 60 % (ref 38–73)
NEUTS BAND NFR BLD MANUAL: 6 %
NRBC BLD-RTO: 0 /100 WBC
PHOSPHATE SERPL-MCNC: 2.4 MG/DL (ref 2.7–4.5)
PLATELET # BLD AUTO: 174 K/UL (ref 150–450)
PLATELET BLD QL SMEAR: ABNORMAL
PMV BLD AUTO: 9.4 FL (ref 9.2–12.9)
POCT GLUCOSE: 189 MG/DL (ref 70–110)
POCT GLUCOSE: 199 MG/DL (ref 70–110)
POCT GLUCOSE: 200 MG/DL (ref 70–110)
POCT GLUCOSE: 208 MG/DL (ref 70–110)
POCT GLUCOSE: 228 MG/DL (ref 70–110)
POCT GLUCOSE: 239 MG/DL (ref 70–110)
POTASSIUM SERPL-SCNC: 3.8 MMOL/L (ref 3.5–5.1)
POTASSIUM SERPL-SCNC: 4.2 MMOL/L (ref 3.5–5.1)
RBC # BLD AUTO: 3.22 M/UL (ref 4–5.4)
SODIUM SERPL-SCNC: 136 MMOL/L (ref 136–145)
SODIUM SERPL-SCNC: 137 MMOL/L (ref 136–145)
WBC # BLD AUTO: 4.73 K/UL (ref 3.9–12.7)

## 2022-05-29 PROCEDURE — 63600175 PHARM REV CODE 636 W HCPCS: Performed by: SURGERY

## 2022-05-29 PROCEDURE — A4216 STERILE WATER/SALINE, 10 ML: HCPCS | Performed by: STUDENT IN AN ORGANIZED HEALTH CARE EDUCATION/TRAINING PROGRAM

## 2022-05-29 PROCEDURE — 63600175 PHARM REV CODE 636 W HCPCS: Performed by: STUDENT IN AN ORGANIZED HEALTH CARE EDUCATION/TRAINING PROGRAM

## 2022-05-29 PROCEDURE — 63600175 PHARM REV CODE 636 W HCPCS: Performed by: HOSPITALIST

## 2022-05-29 PROCEDURE — 25000003 PHARM REV CODE 250: Performed by: HOSPITALIST

## 2022-05-29 PROCEDURE — 83735 ASSAY OF MAGNESIUM: CPT | Performed by: SURGERY

## 2022-05-29 PROCEDURE — A4216 STERILE WATER/SALINE, 10 ML: HCPCS | Performed by: HOSPITALIST

## 2022-05-29 PROCEDURE — 80048 BASIC METABOLIC PNL TOTAL CA: CPT | Mod: 91 | Performed by: SURGERY

## 2022-05-29 PROCEDURE — 25000003 PHARM REV CODE 250: Performed by: STUDENT IN AN ORGANIZED HEALTH CARE EDUCATION/TRAINING PROGRAM

## 2022-05-29 PROCEDURE — 94761 N-INVAS EAR/PLS OXIMETRY MLT: CPT

## 2022-05-29 PROCEDURE — 85027 COMPLETE CBC AUTOMATED: CPT | Performed by: SURGERY

## 2022-05-29 PROCEDURE — B4185 PARENTERAL SOL 10 GM LIPIDS: HCPCS | Performed by: STUDENT IN AN ORGANIZED HEALTH CARE EDUCATION/TRAINING PROGRAM

## 2022-05-29 PROCEDURE — 99900035 HC TECH TIME PER 15 MIN (STAT)

## 2022-05-29 PROCEDURE — 12000002 HC ACUTE/MED SURGE SEMI-PRIVATE ROOM

## 2022-05-29 PROCEDURE — 80048 BASIC METABOLIC PNL TOTAL CA: CPT | Performed by: SURGERY

## 2022-05-29 PROCEDURE — 84100 ASSAY OF PHOSPHORUS: CPT | Performed by: SURGERY

## 2022-05-29 PROCEDURE — 85007 BL SMEAR W/DIFF WBC COUNT: CPT | Performed by: SURGERY

## 2022-05-29 PROCEDURE — C9399 UNCLASSIFIED DRUGS OR BIOLOG: HCPCS | Performed by: STUDENT IN AN ORGANIZED HEALTH CARE EDUCATION/TRAINING PROGRAM

## 2022-05-29 PROCEDURE — 25000003 PHARM REV CODE 250: Performed by: SURGERY

## 2022-05-29 RX ORDER — ATORVASTATIN CALCIUM 20 MG/1
20 TABLET, FILM COATED ORAL DAILY
Status: DISCONTINUED | OUTPATIENT
Start: 2022-05-29 | End: 2022-06-01 | Stop reason: HOSPADM

## 2022-05-29 RX ORDER — IBUPROFEN 200 MG
16 TABLET ORAL
Status: DISCONTINUED | OUTPATIENT
Start: 2022-05-29 | End: 2022-06-01 | Stop reason: HOSPADM

## 2022-05-29 RX ORDER — IBUPROFEN 200 MG
24 TABLET ORAL
Status: DISCONTINUED | OUTPATIENT
Start: 2022-05-29 | End: 2022-06-01 | Stop reason: HOSPADM

## 2022-05-29 RX ORDER — ENOXAPARIN SODIUM 100 MG/ML
40 INJECTION SUBCUTANEOUS EVERY 24 HOURS
Status: DISCONTINUED | OUTPATIENT
Start: 2022-05-29 | End: 2022-06-01 | Stop reason: HOSPADM

## 2022-05-29 RX ORDER — OXYCODONE AND ACETAMINOPHEN 7.5; 325 MG/1; MG/1
1 TABLET ORAL EVERY 4 HOURS PRN
Status: DISCONTINUED | OUTPATIENT
Start: 2022-05-29 | End: 2022-06-01 | Stop reason: HOSPADM

## 2022-05-29 RX ORDER — GLUCAGON 1 MG
1 KIT INJECTION
Status: DISCONTINUED | OUTPATIENT
Start: 2022-05-29 | End: 2022-06-01 | Stop reason: HOSPADM

## 2022-05-29 RX ORDER — NAPROXEN SODIUM 220 MG/1
81 TABLET, FILM COATED ORAL DAILY
Status: DISCONTINUED | OUTPATIENT
Start: 2022-05-29 | End: 2022-06-01 | Stop reason: HOSPADM

## 2022-05-29 RX ORDER — INSULIN ASPART 100 [IU]/ML
1-10 INJECTION, SOLUTION INTRAVENOUS; SUBCUTANEOUS
Status: DISCONTINUED | OUTPATIENT
Start: 2022-05-29 | End: 2022-06-01 | Stop reason: HOSPADM

## 2022-05-29 RX ORDER — FERROUS SULFATE, DRIED 160(50) MG
1 TABLET, EXTENDED RELEASE ORAL DAILY
Status: DISCONTINUED | OUTPATIENT
Start: 2022-05-29 | End: 2022-06-01 | Stop reason: HOSPADM

## 2022-05-29 RX ADMIN — MUPIROCIN: 20 OINTMENT TOPICAL at 08:05

## 2022-05-29 RX ADMIN — SODIUM CHLORIDE, PRESERVATIVE FREE 10 ML: 5 INJECTION INTRAVENOUS at 06:05

## 2022-05-29 RX ADMIN — ATORVASTATIN CALCIUM 20 MG: 20 TABLET, FILM COATED ORAL at 08:05

## 2022-05-29 RX ADMIN — SODIUM CHLORIDE 1.2 UNITS/HR: 9 INJECTION, SOLUTION INTRAVENOUS at 04:05

## 2022-05-29 RX ADMIN — SOYBEAN OIL 250 ML: 20 INJECTION, SOLUTION INTRAVENOUS at 10:05

## 2022-05-29 RX ADMIN — Medication 1 TABLET: at 08:05

## 2022-05-29 RX ADMIN — HYDROMORPHONE HYDROCHLORIDE 0.5 MG: 1 INJECTION, SOLUTION INTRAMUSCULAR; INTRAVENOUS; SUBCUTANEOUS at 09:05

## 2022-05-29 RX ADMIN — OXYCODONE AND ACETAMINOPHEN 1 TABLET: 7.5; 325 TABLET ORAL at 03:05

## 2022-05-29 RX ADMIN — SODIUM CHLORIDE, PRESERVATIVE FREE 10 ML: 5 INJECTION INTRAVENOUS at 12:05

## 2022-05-29 RX ADMIN — SODIUM CHLORIDE, PRESERVATIVE FREE 10 ML: 5 INJECTION INTRAVENOUS at 11:05

## 2022-05-29 RX ADMIN — INSULIN ASPART 1 UNITS: 100 INJECTION, SOLUTION INTRAVENOUS; SUBCUTANEOUS at 08:05

## 2022-05-29 RX ADMIN — HYDROMORPHONE HYDROCHLORIDE 0.5 MG: 1 INJECTION, SOLUTION INTRAMUSCULAR; INTRAVENOUS; SUBCUTANEOUS at 04:05

## 2022-05-29 RX ADMIN — SODIUM BICARBONATE: 84 INJECTION, SOLUTION INTRAVENOUS at 04:05

## 2022-05-29 RX ADMIN — HYDROMORPHONE HYDROCHLORIDE 0.5 MG: 1 INJECTION, SOLUTION INTRAMUSCULAR; INTRAVENOUS; SUBCUTANEOUS at 08:05

## 2022-05-29 RX ADMIN — POTASSIUM CHLORIDE 40 MEQ: 29.8 INJECTION, SOLUTION INTRAVENOUS at 02:05

## 2022-05-29 RX ADMIN — INSULIN DETEMIR 10 UNITS: 100 INJECTION, SOLUTION SUBCUTANEOUS at 08:05

## 2022-05-29 RX ADMIN — ASPIRIN 81 MG CHEWABLE TABLET 81 MG: 81 TABLET CHEWABLE at 08:05

## 2022-05-29 RX ADMIN — LEUCINE, PHENYLALANINE, LYSINE, METHIONINE, ISOLEUCINE, VALINE, HISTIDINE, THREONINE, TRYPTOPHAN, ALANINE, GLYCINE, ARGININE, PROLINE, SERINE, TYROSINE, SODIUM ACETATE, DIBASIC POTASSIUM PHOSPHATE, MAGNESIUM CHLORIDE, SODIUM CHLORIDE, CALCIUM CHLORIDE, DEXTROSE
365; 280; 290; 200; 300; 290; 240; 210; 90; 1035; 515; 575; 340; 250; 20; 340; 261; 51; 59; 33; 15 INJECTION INTRAVENOUS at 10:05

## 2022-05-29 RX ADMIN — INSULIN ASPART 4 UNITS: 100 INJECTION, SOLUTION INTRAVENOUS; SUBCUTANEOUS at 06:05

## 2022-05-29 NOTE — PLAN OF CARE
Problem: Adult Inpatient Plan of Care  Goal: Plan of Care Review  Outcome: Ongoing, Progressing   Alert oriented temp max 99 OOB to BSC numerous times during night. Gait steady Voiding qs since riley removed. Remains on insulin gtt with q 2 hour accuchecks. Safety measures in place.

## 2022-05-29 NOTE — ASSESSMENT & PLAN NOTE
2 Days Post-Op ex lap and adhesiolysis for sbo    Await gi function return before removing ngt  Ok for cl diet for now for comfort

## 2022-05-29 NOTE — ASSESSMENT & PLAN NOTE
-NG tube  -CLQ diet; advance as tolerated  -TPN  -Surgery following  -PRN IV pain and nausea control  -PT/OT  -Spirometry

## 2022-05-29 NOTE — NURSING
Received report from [facility] ICU nurse. Patient escorted to #[id number] via wheelchair, vitals taken. Will continue to monitor.

## 2022-05-29 NOTE — SUBJECTIVE & OBJECTIVE
"Interval History: see "Hospital Course"    Review of Systems   Constitutional:  Positive for activity change and appetite change. Negative for fatigue.   Respiratory:  Negative for cough and shortness of breath.    Cardiovascular:  Negative for chest pain and leg swelling.   Gastrointestinal:  Positive for abdominal pain and nausea.   Neurological:  Negative for weakness.   Psychiatric/Behavioral:  Negative for confusion.    All other systems reviewed and are negative.  Objective:     Vital Signs (Most Recent):  Temp: 98.6 °F (37 °C) (05/29/22 1112)  Pulse: 91 (05/29/22 1112)  Resp: 16 (05/29/22 1558)  BP: 126/78 (05/29/22 1112)  SpO2: 97 % (05/29/22 1112) Vital Signs (24h Range):  Temp:  [98.6 °F (37 °C)-99.1 °F (37.3 °C)] 98.6 °F (37 °C)  Pulse:  [] 91  Resp:  [12-48] 16  SpO2:  [95 %-100 %] 97 %  BP: (107-155)/(69-97) 126/78     Weight: 78.6 kg (173 lb 4.5 oz)  Body mass index is 26.35 kg/m².    Intake/Output Summary (Last 24 hours) at 5/29/2022 1606  Last data filed at 5/29/2022 0955  Gross per 24 hour   Intake 5688.06 ml   Output 2550 ml   Net 3138.06 ml      Physical Exam  Vitals and nursing note reviewed.   Constitutional:       General: She is not in acute distress.  HENT:      Head: Normocephalic and atraumatic.      Right Ear: External ear normal.      Left Ear: External ear normal.      Nose: Nose normal.      Comments: Nasogastric tube.     Mouth/Throat:      Mouth: Mucous membranes are moist.      Pharynx: Oropharynx is clear.   Eyes:      Extraocular Movements: Extraocular movements intact.      Conjunctiva/sclera: Conjunctivae normal.   Cardiovascular:      Rate and Rhythm: Normal rate and regular rhythm.      Pulses: Normal pulses.      Heart sounds: Normal heart sounds. No murmur heard.  Pulmonary:      Effort: Pulmonary effort is normal.      Breath sounds: Normal breath sounds.   Abdominal:      General: There is no distension.      Palpations: Abdomen is soft.      Tenderness: There is no " guarding.      Comments: Abdominal wound clean, dry and intact.   Musculoskeletal:         General: Normal range of motion.      Cervical back: Normal range of motion and neck supple.      Right lower leg: No edema.      Left lower leg: No edema.   Skin:     General: Skin is warm and dry.      Coloration: Skin is not pale.      Findings: No rash.   Neurological:      General: No focal deficit present.      Mental Status: She is alert and oriented to person, place, and time. Mental status is at baseline.   Psychiatric:         Mood and Affect: Mood normal.         Behavior: Behavior normal.       Significant Labs: All pertinent labs within the past 24 hours have been reviewed.    Significant Imaging: I have reviewed all pertinent imaging results/findings within the past 24 hours.

## 2022-05-29 NOTE — CARE UPDATE
05/29/22 0725   Patient Assessment/Suction   Level of Consciousness (AVPU) alert   PRE-TX-O2   O2 Device (Oxygen Therapy) room air   SpO2 100 %   Pulse Oximetry Type Continuous   $ Pulse Oximetry - Multiple Charge Pulse Oximetry - Multiple   Pulse 83   Resp 14   Positioning HOB elevated 30 degrees

## 2022-05-29 NOTE — CARE UPDATE
05/28/22 1910   Patient Assessment/Suction   Level of Consciousness (AVPU) alert   Respiratory Effort Unlabored   Expansion/Accessory Muscles/Retractions no use of accessory muscles   PRE-TX-O2   O2 Device (Oxygen Therapy) room air   SpO2 99 %   Pulse Oximetry Type Continuous   $ Pulse Oximetry - Multiple Charge Pulse Oximetry - Multiple   Pulse 89   Resp 19

## 2022-05-29 NOTE — PROGRESS NOTES
[facility] Medicine  Progress Note    Patient Name: Brooks Browning  MRN: [id number]  Patient Class: IP- Inpatient   Admission Date: 2022  Length of Stay: 5 days  Attending Physician: Erwin Harmon MD  Primary Care Provider: Sofía Murcia MD        Subjective:     Principal Problem:Small bowel obstruction        HPI:  Patient is a 56-year-old  female with a past medical history significant for type 2 diabetes primarily dependent on metformin, hypertension, and multiple joint replacements who presents to the hospital with approximately 1 week of abdominal pain and vomiting.  Patient states that she initially started having abdominal pain approximately 6 days prior to admission with intermittent pain worsening to constant pain with bilious vomiting.  Patient denies any diarrhea or other GI complaints.  Denies any fever or other systemic symptoms.  Patient states that she has never had any episode like this previously.  She initially thought it was a stomach flu , but as it worsened, she presented to the emergency department.  At the outside emergency department, the patient was found to be in diabetic ketoacidosis with elevated beta hydroxybutyrate as well as metabolic acidosis and she was also found to have a secondary small-bowel obstruction.  Patient does have a history of  section in the past.  Patient was transferred from [facility] to [facility] for treatment of her various medical issues.      Overview/Hospital Course:  Brooks Browning is a 56 year old female with a past medical history of HTN, HLD, DM, and anemia who was admitted to the hospital with small-bowel obstruction (history of ) and diabetic ketoacidosis. She was started on insulin drip per DKA protocol upon admission and was evaluated by General Surgery. A nasogastric tube was inserted, yet she continued to have significant output through the NG tube, and  "a repeat contrast study noted no evidence of progression of contrast through the small bowel. The patient subsequently underwent exploratory laparotomy on 05/27 with lysis of adhesions. She was started on TPN. Her DKA has resolved. She was started on a clear liquid diet per Surgery while an NG tube remains in place. She was moved out of the ICU 5/29/2022.      Interval History: see "Hospital Course"    Review of Systems   Constitutional:  Positive for activity change and appetite change. Negative for fatigue.   Respiratory:  Negative for cough and shortness of breath.    Cardiovascular:  Negative for chest pain and leg swelling.   Gastrointestinal:  Positive for abdominal pain and nausea.   Neurological:  Negative for weakness.   Psychiatric/Behavioral:  Negative for confusion.    All other systems reviewed and are negative.  Objective:     Vital Signs (Most Recent):  Temp: 98.6 °F (37 °C) (05/29/22 1112)  Pulse: 91 (05/29/22 1112)  Resp: 16 (05/29/22 1558)  BP: 126/78 (05/29/22 1112)  SpO2: 97 % (05/29/22 1112) Vital Signs (24h Range):  Temp:  [98.6 °F (37 °C)-99.1 °F (37.3 °C)] 98.6 °F (37 °C)  Pulse:  [] 91  Resp:  [12-48] 16  SpO2:  [95 %-100 %] 97 %  BP: (107-155)/(69-97) 126/78     Weight: 78.6 kg (173 lb 4.5 oz)  Body mass index is 26.35 kg/m².    Intake/Output Summary (Last 24 hours) at 5/29/2022 1606  Last data filed at 5/29/2022 0955  Gross per 24 hour   Intake 5688.06 ml   Output 2550 ml   Net 3138.06 ml      Physical Exam  Vitals and nursing note reviewed.   Constitutional:       General: She is not in acute distress.  HENT:      Head: Normocephalic and atraumatic.      Right Ear: External ear normal.      Left Ear: External ear normal.      Nose: Nose normal.      Comments: Nasogastric tube.     Mouth/Throat:      Mouth: Mucous membranes are moist.      Pharynx: Oropharynx is clear.   Eyes:      Extraocular Movements: Extraocular movements intact.      Conjunctiva/sclera: Conjunctivae normal. "   Cardiovascular:      Rate and Rhythm: Normal rate and regular rhythm.      Pulses: Normal pulses.      Heart sounds: Normal heart sounds. No murmur heard.  Pulmonary:      Effort: Pulmonary effort is normal.      Breath sounds: Normal breath sounds.   Abdominal:      General: There is no distension.      Palpations: Abdomen is soft.      Tenderness: There is no guarding.      Comments: Abdominal wound clean, dry and intact.   Musculoskeletal:         General: Normal range of motion.      Cervical back: Normal range of motion and neck supple.      Right lower leg: No edema.      Left lower leg: No edema.   Skin:     General: Skin is warm and dry.      Coloration: Skin is not pale.      Findings: No rash.   Neurological:      General: No focal deficit present.      Mental Status: She is alert and oriented to person, place, and time. Mental status is at baseline.   Psychiatric:         Mood and Affect: Mood normal.         Behavior: Behavior normal.       Significant Labs: All pertinent labs within the past 24 hours have been reviewed.    Significant Imaging: I have reviewed all pertinent imaging results/findings within the past 24 hours.      Assessment/Plan:      * Small bowel obstruction  -NG tube  -CLQ diet; advance as tolerated  -TPN  -Surgery following  -PRN IV pain and nausea control  -PT/OT  -Spirometry      Vitamin D deficiency  -Ca and vitamin D repletion      HLD (hyperlipidemia)  -Continue ASA and statin      HTN (hypertension)  BP stable.  -Continue to monitor      DM (diabetes mellitus)  -SSI  -Detemir 10 units daily  -CLQ diet; TPN  -AC HS glucose checks  -Hypoglycemic precautions      Hypocalcemia  -Replete PRN based on iCa  -Telemetry            Hypophosphatasia  -Replete PRN  -Trend P  -Telemetry          Hypomagnesemia  -Replete PRN  -Telemetry  -Trend Mg           Severe malnutrition  -CMP trend  -Advance diet as tolerated  -TPN  -Prealbumin and triglyceride levels weekly        VTE Risk  Mitigation (From admission, onward)         Ordered     enoxaparin injection 40 mg  Daily         05/29/22 0735     Place sequential compression device  Until discontinued         05/24/22 1912     IP VTE LOW RISK PATIENT  Once         05/24/22 1912                Discharge Planning   STEFANIE: 6/1/2022     Code Status: Full Code   Is the patient medically ready for discharge?:     Reason for patient still in hospital (select all that apply): Patient trending condition, Laboratory test, Consult recommendations and PT / OT recommendations  Discharge Plan A: Home                  Erwin Harmon MD  Department of Hospital Medicine   [facility]

## 2022-05-29 NOTE — PROGRESS NOTES
[facility] Surgery  Progress Note    Subjective:     History of Present Illness:  No notes on file    Post-Op Info:  Procedure(s) (LRB):  LAPAROTOMY, EXPLORATORY (N/A)  LYSIS, ADHESIONS (N/A)   2 Days Post-Op     Interval History: no flatus yet   No complaints otherwise    Medications:  Continuous Infusions:   Amino acid 5% - dextrose 15% (CLINIMIX-E) solution with additives.  CENTRAL LINE ONLY (1395 mOsm/L) 50 mL/hr at 05/28/22 2028    Amino acid 5% - dextrose 15% (CLINIMIX-E) solution with additives.  CENTRAL LINE ONLY (1395 mOsm/L)       Scheduled Meds:   aspirin  81 mg Oral Daily    atorvastatin  20 mg Oral Daily    calcium-vitamin D3  1 tablet Oral Daily    enoxaparin  40 mg Subcutaneous Daily    fat emulsion 20%  250 mL Intravenous Daily    fat emulsion 20%  250 mL Intravenous Daily    insulin detemir U-100  10 Units Subcutaneous Daily    mupirocin   Nasal BID    sodium chloride 0.9%  10 mL Intravenous Q6H     PRN Meds:acetaminophen, calcium gluconate IVPB, calcium gluconate IVPB, calcium gluconate IVPB, dextrose 10%, dextrose 10%, glucagon (human recombinant), glucose, glucose, HYDROmorphone, insulin aspart U-100, magnesium sulfate IVPB, magnesium sulfate IVPB, oxyCODONE-acetaminophen, potassium chloride in water **AND** potassium chloride in water **AND** potassium chloride in water, prochlorperazine, Flushing PICC Protocol **AND** sodium chloride 0.9% **AND** sodium chloride 0.9%, sodium phosphate IVPB, sodium phosphate IVPB, sodium phosphate IVPB     Review of patient's allergies indicates:   Allergen Reactions    Morphine Rash     Objective:     Vital Signs (Most Recent):  Temp: 98.7 °F (37.1 °C) (05/29/22 0915)  Pulse: 85 (05/29/22 0930)  Resp: 12 (05/29/22 0930)  BP: 139/78 (05/29/22 0900)  SpO2: 99 % (05/29/22 0930) Vital Signs (24h Range):  Temp:  [98.6 °F (37 °C)-99.1 °F (37.3 °C)] 98.7 °F (37.1 °C)  Pulse:  [] 85  Resp:  [12-48] 12  SpO2:  [95 %-100 %]  99 %  BP: (107-155)/(69-97) 139/78     Weight: 78.6 kg (173 lb 4.5 oz)  Body mass index is 26.35 kg/m².    Intake/Output - Last 3 Shifts         05/27 0700 05/28 0659 05/28 0700 05/29 0659 05/29 0700 05/30 0659    P.O.  100     I.V. (mL/kg) 3396.8 (44.3) 3632.9 (46.2) 338.9 (4.3)    IV Piggyback 1783.3 900     .4 1325.3 165.9    Total Intake(mL/kg) 5904.5 (77.1) 5958.3 (75.8) 504.7 (6.4)    Urine (mL/kg/hr) 1557 (0.8) 2350 (1.2) 200 (0.9)    Drains 1200 700     Other 1250      Stool 0 0     Blood 5      Total Output 4012 3050 200    Net +1892.5 +2908.3 +304.7           Stool Occurrence 0 x 0 x             Physical Exam  Abdominal:      General: There is distension.      Palpations: Abdomen is soft.      Tenderness: There is no abdominal tenderness.       Significant Labs:  I have reviewed all pertinent lab results within the past 24 hours.  CBC:   Recent Labs   Lab 05/29/22  0629   WBC 4.73   RBC 3.22*   HGB 10.6*   HCT 29.7*      MCV 92   MCH 32.9*   MCHC 35.7     BMP:   Recent Labs   Lab 05/29/22  0629   *      K 4.2   CL 95   CO2 32*   BUN 10   CREATININE 0.6   CALCIUM 7.7*   MG 1.5*       Significant Diagnostics:  I have reviewed all pertinent imaging results/findings within the past 24 hours.    Assessment/Plan:     * Small bowel obstruction  2 Days Post-Op ex lap and adhesiolysis for sbo    Await gi function return before removing ngt  Ok for cl diet for now for comfort        Nora Al MD  General Surgery  [facility]

## 2022-05-29 NOTE — SUBJECTIVE & OBJECTIVE
Interval History: no flatus yet   No complaints otherwise    Medications:  Continuous Infusions:   Amino acid 5% - dextrose 15% (CLINIMIX-E) solution with additives.  CENTRAL LINE ONLY (1395 mOsm/L) 50 mL/hr at 05/28/22 2028    Amino acid 5% - dextrose 15% (CLINIMIX-E) solution with additives.  CENTRAL LINE ONLY (1395 mOsm/L)       Scheduled Meds:   aspirin  81 mg Oral Daily    atorvastatin  20 mg Oral Daily    calcium-vitamin D3  1 tablet Oral Daily    enoxaparin  40 mg Subcutaneous Daily    fat emulsion 20%  250 mL Intravenous Daily    fat emulsion 20%  250 mL Intravenous Daily    insulin detemir U-100  10 Units Subcutaneous Daily    mupirocin   Nasal BID    sodium chloride 0.9%  10 mL Intravenous Q6H     PRN Meds:acetaminophen, calcium gluconate IVPB, calcium gluconate IVPB, calcium gluconate IVPB, dextrose 10%, dextrose 10%, glucagon (human recombinant), glucose, glucose, HYDROmorphone, insulin aspart U-100, magnesium sulfate IVPB, magnesium sulfate IVPB, oxyCODONE-acetaminophen, potassium chloride in water **AND** potassium chloride in water **AND** potassium chloride in water, prochlorperazine, Flushing PICC Protocol **AND** sodium chloride 0.9% **AND** sodium chloride 0.9%, sodium phosphate IVPB, sodium phosphate IVPB, sodium phosphate IVPB     Review of patient's allergies indicates:   Allergen Reactions    Morphine Rash     Objective:     Vital Signs (Most Recent):  Temp: 98.7 °F (37.1 °C) (05/29/22 0915)  Pulse: 85 (05/29/22 0930)  Resp: 12 (05/29/22 0930)  BP: 139/78 (05/29/22 0900)  SpO2: 99 % (05/29/22 0930) Vital Signs (24h Range):  Temp:  [98.6 °F (37 °C)-99.1 °F (37.3 °C)] 98.7 °F (37.1 °C)  Pulse:  [] 85  Resp:  [12-48] 12  SpO2:  [95 %-100 %] 99 %  BP: (107-155)/(69-97) 139/78     Weight: 78.6 kg (173 lb 4.5 oz)  Body mass index is 26.35 kg/m².    Intake/Output - Last 3 Shifts         05/27 0700 05/28 0659 05/28 0700 05/29 0659 05/29 0700 05/30 0659    P.O.  100     I.V. (mL/kg) 3396.8  (44.3) 3632.9 (46.2) 338.9 (4.3)    IV Piggyback 1783.3 900     .4 1325.3 165.9    Total Intake(mL/kg) 5904.5 (77.1) 5958.3 (75.8) 504.7 (6.4)    Urine (mL/kg/hr) 1557 (0.8) 2350 (1.2) 200 (0.9)    Drains 1200 700     Other 1250      Stool 0 0     Blood 5      Total Output 4012 3050 200    Net +1892.5 +2908.3 +304.7           Stool Occurrence 0 x 0 x             Physical Exam  Abdominal:      General: There is distension.      Palpations: Abdomen is soft.      Tenderness: There is no abdominal tenderness.       Significant Labs:  I have reviewed all pertinent lab results within the past 24 hours.  CBC:   Recent Labs   Lab 05/29/22  0629   WBC 4.73   RBC 3.22*   HGB 10.6*   HCT 29.7*      MCV 92   MCH 32.9*   MCHC 35.7     BMP:   Recent Labs   Lab 05/29/22  0629   *      K 4.2   CL 95   CO2 32*   BUN 10   CREATININE 0.6   CALCIUM 7.7*   MG 1.5*       Significant Diagnostics:  I have reviewed all pertinent imaging results/findings within the past 24 hours.

## 2022-05-30 PROBLEM — E83.51 HYPOCALCEMIA: Status: RESOLVED | Noted: 2022-05-28 | Resolved: 2022-05-30

## 2022-05-30 PROBLEM — E83.39 HYPOPHOSPHATASIA: Status: RESOLVED | Noted: 2022-05-28 | Resolved: 2022-05-30

## 2022-05-30 LAB
ALBUMIN SERPL BCP-MCNC: 1.8 G/DL (ref 3.5–5.2)
ALP SERPL-CCNC: 52 U/L (ref 55–135)
ALT SERPL W/O P-5'-P-CCNC: 10 U/L (ref 10–44)
ANION GAP SERPL CALC-SCNC: 9 MMOL/L (ref 8–16)
AST SERPL-CCNC: 13 U/L (ref 10–40)
BASOPHILS # BLD AUTO: 0.02 K/UL (ref 0–0.2)
BASOPHILS NFR BLD: 0.4 % (ref 0–1.9)
BILIRUB SERPL-MCNC: 0.4 MG/DL (ref 0.1–1)
BUN SERPL-MCNC: 12 MG/DL (ref 6–20)
CA-I BLDV-SCNC: 1.16 MMOL/L (ref 1.06–1.42)
CALCIUM SERPL-MCNC: 8.6 MG/DL (ref 8.7–10.5)
CHLORIDE SERPL-SCNC: 95 MMOL/L (ref 95–110)
CO2 SERPL-SCNC: 29 MMOL/L (ref 23–29)
CREAT SERPL-MCNC: 0.6 MG/DL (ref 0.5–1.4)
DIFFERENTIAL METHOD: ABNORMAL
EOSINOPHIL # BLD AUTO: 0.1 K/UL (ref 0–0.5)
EOSINOPHIL NFR BLD: 1.9 % (ref 0–8)
ERYTHROCYTE [DISTWIDTH] IN BLOOD BY AUTOMATED COUNT: 12.5 % (ref 11.5–14.5)
EST. GFR  (AFRICAN AMERICAN): >60 ML/MIN/1.73 M^2
EST. GFR  (NON AFRICAN AMERICAN): >60 ML/MIN/1.73 M^2
GLUCOSE SERPL-MCNC: 216 MG/DL (ref 70–110)
HCT VFR BLD AUTO: 28.4 % (ref 37–48.5)
HGB BLD-MCNC: 9.6 G/DL (ref 12–16)
IMM GRANULOCYTES # BLD AUTO: 0.04 K/UL (ref 0–0.04)
IMM GRANULOCYTES NFR BLD AUTO: 0.8 % (ref 0–0.5)
LYMPHOCYTES # BLD AUTO: 1.3 K/UL (ref 1–4.8)
LYMPHOCYTES NFR BLD: 26.6 % (ref 18–48)
MAGNESIUM SERPL-MCNC: 1.3 MG/DL (ref 1.6–2.6)
MCH RBC QN AUTO: 32.3 PG (ref 27–31)
MCHC RBC AUTO-ENTMCNC: 33.8 G/DL (ref 32–36)
MCV RBC AUTO: 96 FL (ref 82–98)
MONOCYTES # BLD AUTO: 0.5 K/UL (ref 0.3–1)
MONOCYTES NFR BLD: 10.7 % (ref 4–15)
NEUTROPHILS # BLD AUTO: 2.9 K/UL (ref 1.8–7.7)
NEUTROPHILS NFR BLD: 59.6 % (ref 38–73)
NRBC BLD-RTO: 0 /100 WBC
PHOSPHATE SERPL-MCNC: 4.2 MG/DL (ref 2.7–4.5)
PLATELET # BLD AUTO: 170 K/UL (ref 150–450)
PMV BLD AUTO: 9.3 FL (ref 9.2–12.9)
POCT GLUCOSE: 163 MG/DL (ref 70–110)
POCT GLUCOSE: 186 MG/DL (ref 70–110)
POCT GLUCOSE: 245 MG/DL (ref 70–110)
POCT GLUCOSE: 67 MG/DL (ref 70–110)
POTASSIUM SERPL-SCNC: 3.9 MMOL/L (ref 3.5–5.1)
PROT SERPL-MCNC: 4.6 G/DL (ref 6–8.4)
RBC # BLD AUTO: 2.97 M/UL (ref 4–5.4)
SODIUM SERPL-SCNC: 133 MMOL/L (ref 136–145)
WBC # BLD AUTO: 4.78 K/UL (ref 3.9–12.7)

## 2022-05-30 PROCEDURE — C9399 UNCLASSIFIED DRUGS OR BIOLOG: HCPCS | Performed by: STUDENT IN AN ORGANIZED HEALTH CARE EDUCATION/TRAINING PROGRAM

## 2022-05-30 PROCEDURE — A4217 STERILE WATER/SALINE, 500 ML: HCPCS | Performed by: STUDENT IN AN ORGANIZED HEALTH CARE EDUCATION/TRAINING PROGRAM

## 2022-05-30 PROCEDURE — 25000003 PHARM REV CODE 250: Performed by: STUDENT IN AN ORGANIZED HEALTH CARE EDUCATION/TRAINING PROGRAM

## 2022-05-30 PROCEDURE — B4185 PARENTERAL SOL 10 GM LIPIDS: HCPCS | Performed by: STUDENT IN AN ORGANIZED HEALTH CARE EDUCATION/TRAINING PROGRAM

## 2022-05-30 PROCEDURE — 82330 ASSAY OF CALCIUM: CPT | Performed by: STUDENT IN AN ORGANIZED HEALTH CARE EDUCATION/TRAINING PROGRAM

## 2022-05-30 PROCEDURE — 97116 GAIT TRAINING THERAPY: CPT

## 2022-05-30 PROCEDURE — 97165 OT EVAL LOW COMPLEX 30 MIN: CPT

## 2022-05-30 PROCEDURE — 85025 COMPLETE CBC W/AUTO DIFF WBC: CPT | Performed by: STUDENT IN AN ORGANIZED HEALTH CARE EDUCATION/TRAINING PROGRAM

## 2022-05-30 PROCEDURE — 97161 PT EVAL LOW COMPLEX 20 MIN: CPT

## 2022-05-30 PROCEDURE — 12000002 HC ACUTE/MED SURGE SEMI-PRIVATE ROOM

## 2022-05-30 PROCEDURE — 36415 COLL VENOUS BLD VENIPUNCTURE: CPT | Performed by: STUDENT IN AN ORGANIZED HEALTH CARE EDUCATION/TRAINING PROGRAM

## 2022-05-30 PROCEDURE — 83735 ASSAY OF MAGNESIUM: CPT | Performed by: STUDENT IN AN ORGANIZED HEALTH CARE EDUCATION/TRAINING PROGRAM

## 2022-05-30 PROCEDURE — 94761 N-INVAS EAR/PLS OXIMETRY MLT: CPT

## 2022-05-30 PROCEDURE — 94799 UNLISTED PULMONARY SVC/PX: CPT

## 2022-05-30 PROCEDURE — 63600175 PHARM REV CODE 636 W HCPCS: Performed by: STUDENT IN AN ORGANIZED HEALTH CARE EDUCATION/TRAINING PROGRAM

## 2022-05-30 PROCEDURE — 84100 ASSAY OF PHOSPHORUS: CPT | Performed by: STUDENT IN AN ORGANIZED HEALTH CARE EDUCATION/TRAINING PROGRAM

## 2022-05-30 PROCEDURE — 99406 BEHAV CHNG SMOKING 3-10 MIN: CPT

## 2022-05-30 PROCEDURE — 80053 COMPREHEN METABOLIC PANEL: CPT | Performed by: STUDENT IN AN ORGANIZED HEALTH CARE EDUCATION/TRAINING PROGRAM

## 2022-05-30 PROCEDURE — A4216 STERILE WATER/SALINE, 10 ML: HCPCS | Performed by: STUDENT IN AN ORGANIZED HEALTH CARE EDUCATION/TRAINING PROGRAM

## 2022-05-30 RX ORDER — MAGNESIUM SULFATE HEPTAHYDRATE 40 MG/ML
4 INJECTION, SOLUTION INTRAVENOUS ONCE
Status: COMPLETED | OUTPATIENT
Start: 2022-05-30 | End: 2022-05-30

## 2022-05-30 RX ORDER — HYDROCHLOROTHIAZIDE 12.5 MG/1
12.5 TABLET ORAL DAILY
Status: DISCONTINUED | OUTPATIENT
Start: 2022-05-30 | End: 2022-06-01

## 2022-05-30 RX ORDER — LISINOPRIL 10 MG/1
20 TABLET ORAL DAILY
Status: DISCONTINUED | OUTPATIENT
Start: 2022-05-30 | End: 2022-06-01 | Stop reason: HOSPADM

## 2022-05-30 RX ADMIN — LISINOPRIL 20 MG: 10 TABLET ORAL at 05:05

## 2022-05-30 RX ADMIN — ATORVASTATIN CALCIUM 20 MG: 20 TABLET, FILM COATED ORAL at 08:05

## 2022-05-30 RX ADMIN — ASCORBIC ACID, VITAMIN A PALMITATE, CHOLECALCIFEROL, THIAMINE HYDROCHLORIDE, RIBOFLAVIN-5 PHOSPHATE SODIUM, PYRIDOXINE HYDROCHLORIDE, NIACINAMIDE, DEXPANTHENOL, ALPHA-TOCOPHEROL ACETATE, VITAMIN K1, FOLIC ACID, BIOTIN, CYANOCOBALAMIN: 200; 3300; 200; 6; 3.6; 6; 40; 15; 10; 150; 600; 60; 5 INJECTION, SOLUTION INTRAVENOUS at 11:05

## 2022-05-30 RX ADMIN — SODIUM CHLORIDE, PRESERVATIVE FREE 10 ML: 5 INJECTION INTRAVENOUS at 12:05

## 2022-05-30 RX ADMIN — INSULIN DETEMIR 5 UNITS: 100 INJECTION, SOLUTION SUBCUTANEOUS at 02:05

## 2022-05-30 RX ADMIN — HYDROCHLOROTHIAZIDE 12.5 MG: 12.5 TABLET ORAL at 05:05

## 2022-05-30 RX ADMIN — ASPIRIN 81 MG CHEWABLE TABLET 81 MG: 81 TABLET CHEWABLE at 08:05

## 2022-05-30 RX ADMIN — MUPIROCIN: 20 OINTMENT TOPICAL at 09:05

## 2022-05-30 RX ADMIN — INSULIN DETEMIR 10 UNITS: 100 INJECTION, SOLUTION SUBCUTANEOUS at 08:05

## 2022-05-30 RX ADMIN — SODIUM CHLORIDE, PRESERVATIVE FREE 10 ML: 5 INJECTION INTRAVENOUS at 11:05

## 2022-05-30 RX ADMIN — INSULIN ASPART 4 UNITS: 100 INJECTION, SOLUTION INTRAVENOUS; SUBCUTANEOUS at 08:05

## 2022-05-30 RX ADMIN — MUPIROCIN: 20 OINTMENT TOPICAL at 08:05

## 2022-05-30 RX ADMIN — MAGNESIUM SULFATE 4 G: 2 INJECTION INTRAVENOUS at 02:05

## 2022-05-30 RX ADMIN — SODIUM CHLORIDE, PRESERVATIVE FREE 10 ML: 5 INJECTION INTRAVENOUS at 06:05

## 2022-05-30 RX ADMIN — MAGNESIUM SULFATE 2 G: 2 INJECTION INTRAVENOUS at 05:05

## 2022-05-30 RX ADMIN — SOYBEAN OIL 250 ML: 20 INJECTION, SOLUTION INTRAVENOUS at 11:05

## 2022-05-30 RX ADMIN — SODIUM CHLORIDE, PRESERVATIVE FREE 10 ML: 5 INJECTION INTRAVENOUS at 05:05

## 2022-05-30 RX ADMIN — Medication 1 TABLET: at 08:05

## 2022-05-30 NOTE — PT/OT/SLP EVAL
"Occupational Therapy   Evaluation    Name: Brooks Browning  MRN: [id number]  Admitting Diagnosis:  Small bowel obstruction  Recent Surgery: Procedure(s) (LRB):  LAPAROTOMY, EXPLORATORY (N/A)  LYSIS, ADHESIONS (N/A) 3 Days Post-Op    Recommendations:     Discharge Recommendations: home  Discharge Equipment Recommendations:  walker, rolling  Barriers to discharge:  None    Assessment:     Brooks Browning is a 56 y.o. female with a medical diagnosis of Small bowel obstruction.  She presents with the following performance deficits affecting function: weakness, impaired endurance, impaired self care skills, impaired functional mobilty, gait instability, impaired balance, decreased ROM. Patient found up in chair and agreeable to participate in OT evaluation. Patient don/doffing slippers and socks with Supervision while seated in chair, grooming with Set-up, sit<>stand with SBA with no AD and stand pivot bedside chair<>BSC with CGA without AD; however, generalized weakness and mild instability noted.    Recommend home with assistance from family upon discharge.       Rehab Prognosis: Good; patient would benefit from acute skilled OT services to address these deficits and reach maximum level of function.       Plan:     Patient to be seen 3 x/week to address the above listed problems via self-care/home management, therapeutic activities, therapeutic exercises  · Plan of Care Expires: 06/06/22  · Plan of Care Reviewed with: patient    Subjective     Chief Complaint: "I haven't been getting my diuretics so my feet and legs are getting swollen." Patient also reports mild shortness of breath with exertion secondary to abdominal incision  Patient/Family Comments/goals: To take a shower when her daughter gets here with her toiletries and other personal items    Occupational Profile:  Living Environment: Lives with family in single story home with tub/shower combo  Previous level of function: Independent with no AD except recent " (past 2 weeks) use of shower chair  Roles and Routines: Drives, cooks, cleans her home  Equipment Used at Home:  other (see comments) (occasional use of shower chair)  Assistance upon Discharge: Family    Pain/Comfort:  · Pain Rating 1: 0/10    Patients cultural, spiritual, Shinto conflicts given the current situation: no    Objective:     Communicated with: Linda Pereira prior to session.  Patient found up in chair with NG tube, peripheral IV, telemetry, chair check, PICC line upon OT entry to room.    General Precautions: Standard, fall   Orthopedic Precautions:N/A (Abdominal incision)   Braces: N/A  Respiratory Status: Room air    Occupational Performance:    Functional Mobility/Transfers:  · Patient completed Sit <> Stand Transfer with stand by assistance  with  no assistive device   · Patient completed Bedside commodeTransfer Stand Pivot technique with contact guard assistance with no AD    Activities of Daily Living:  · Grooming: Set-up    · Lower Body Dressing: supervision to don/doff slippers and  socks while seated in bedside chair  · Toileting: minimum assistance      Cognitive/Visual Perceptual:  Pleasant/cooperative; oriented x 4; safety awareness/fall prevention intact    Physical Exam:  Balance:    -       Static standing balance is FAIR+ and dynamic standing balance is FAIR+ as demonstrated by R/L lateral weight shifting un-supported with no loss of balance  Edema:  Mild - bilateral feet/lower legs  Upper Extremity Range of Motion:     -       Right Upper Extremity: WNL  -       Left Upper Extremity: WNL  Upper Extremity Strength:    -       Right Upper Extremity: WNL  -       Left Upper Extremity: WNL   Strength:    -       Right Upper Extremity: WNL  -       Left Upper Extremity: WNL    AMPAC 6 Click ADL:  AMPAC Total Score: 19    Treatment & Education:  - OTR providing education/instruction regarding OT role/POC, safety awareness/fall prevention, use of call button and bed/chair alarms  and calling for assistance with all mobility - patient verbalizes/demonstrates understanding and in agreement when appropriate; OTR educating on importance of out of bed mobility to counteract negative effects of prolonged inactivity - verbalizes understanding   - OTR providing education/instruction regarding correct transfer sequence/techniques, benefits of use of AD as part of fall prevention - patient verbalizes understanding and in agreement  - OTR initiating discharge planning - recommend home with assistance from family as needed   Education:    Patient left up in chair with all lines intact, call button in reach and chair alarm on    GOALS:   Multidisciplinary Problems     Occupational Therapy Goals        Problem: Occupational Therapy    Goal Priority Disciplines Outcome Interventions   Occupational Therapy Goal     OT, PT/OT     Description: Goals to be met by: 2022     Patient will increase functional independence with ADLs by performing:    LE Dressing with Modified [place].  Grooming while standing with Modified [place].  Toileting from toilet with Modified [place] for hygiene and clothing management.   Toilet transfer to toilet with Modified [place].                     History:     Past Medical History:   Diagnosis Date    Arthritis     Diabetes mellitus     Elevated cholesterol     Hypertension        Past Surgical History:   Procedure Laterality Date     SECTION      HIP SURGERY Right     HYSTERECTOMY      JOINT REPLACEMENT Bilateral     hip    LYSIS OF ADHESIONS N/A 2022    Procedure: LYSIS, ADHESIONS;  Surgeon: Juve Anne Jr., MD;  Location: [facility];  Service: General;  Laterality: N/A;       Time Tracking:     OT Date of Treatment: 22  OT Start Time: 1119  OT Stop Time: 1131  OT Total Time (min): 12 min    Billable Minutes:Evaluation 12    2022

## 2022-05-30 NOTE — PHYSICIAN QUERY
PT Name: Brooks Browning  MR #: [id number]     DOCUMENTATION CLARIFICATION     CDS: So WOODARD RN  Contact information: [email or web address]  Phone number: [phone]  This form is a permanent document in the medical record.     Query Date: May 30, 2022    By submitting this query, we are merely seeking further clarification of documentation to reflect the severity of illness of your patient. Please utilize your independent clinical judgment when addressing the question(s) below.    The medical record reflects the following:     Indicators   Supporting Clinical Findings Location in Medical Record   X Bowel obstruction, intestinal obstruction, LBO or SBO documented Small bowel obstruction  Etiology likely related to adhesions secondary to previous surgery.    Small bowel obstruction  - still with no significant return of bowel function  - imaging remains consistent with high-grade obstruction  - due to failure to progress I have recommended exploratory laparotomy in the OR today H&P HM 5/24/2022      PN Surg 5/27/2022   X Radiology findings High-grade small-bowel obstruction in the mid to distal ileum.    Moderate diffuse small bowel dilatation in a pattern compatible with small-bowel obstruction. CT Abd Pelvis 5/26/2022    XR Abd 5/25/2022   X Treatment/Medication Continue NG tube placement.  General surgery consult in place.  P.r.n. medication for pain and nausea. H&P HM 5/24/2022     X Procedure/Surgery POSTOPERATIVE DIAGNOSIS:  Small-bowel obstruction secondary to adhesive band  PROCEDURE:  Exploratory laparotomy, lysis of adhesions   Op Note 5/27/2022    Other       Provider, please further specify the bowel obstruction diagnosis:    [   ] Partial or incomplete intestinal obstruction, due to adhesions   [  X ] Complete intestinal obstruction, due to adhesions   [   ] Other intestinal condition (please specify): _____________________   [   ]  Clinically Undetermined         Please document in your  progress notes daily for the duration of treatment until resolved, and include in your discharge summary.

## 2022-05-30 NOTE — PROGRESS NOTES
[facility] Hospital Medicine  Progress Note    Patient Name: Brooks Browning  MRN: [id number]  Patient Class: IP- Inpatient   Admission Date: 2022  Length of Stay: 6 days  Attending Physician: Erwin Harmon MD  Primary Care Provider: Sofía Murcia MD        Subjective:     Principal Problem:Small bowel obstruction        HPI:  Patient is a 56-year-old  female with a past medical history significant for type 2 diabetes primarily dependent on metformin, hypertension, and multiple joint replacements who presents to the hospital with approximately 1 week of abdominal pain and vomiting.  Patient states that she initially started having abdominal pain approximately 6 days prior to admission with intermittent pain worsening to constant pain with bilious vomiting.  Patient denies any diarrhea or other GI complaints.  Denies any fever or other systemic symptoms.  Patient states that she has never had any episode like this previously.  She initially thought it was a stomach flu , but as it worsened, she presented to the emergency department.  At the outside emergency department, the patient was found to be in diabetic ketoacidosis with elevated beta hydroxybutyrate as well as metabolic acidosis and she was also found to have a secondary small-bowel obstruction.  Patient does have a history of  section in the past.  Patient was transferred from [facility] to [facility] for treatment of her various medical issues.      Overview/Hospital Course:  Brooks Browning is a 56 year old female with a past medical history of HTN, HLD, DM, and anemia who was admitted to the hospital with small-bowel obstruction (history of ) and diabetic ketoacidosis. She was started on insulin drip per DKA protocol upon admission and was evaluated by General Surgery. A nasogastric tube was inserted, yet she continued to have significant output through the NG tube, and  "a repeat contrast study noted no evidence of progression of contrast through the small bowel. The patient subsequently underwent exploratory laparotomy on 05/27 with lysis of adhesions. She was started on TPN. Her DKA has resolved. She was started on a clear liquid diet per Surgery while an NG tube remains in place. She was moved out of the ICU 5/29/2022. She currently awaits return of bowel function.      Interval History: see "Hospital Course"    Review of Systems   Constitutional:  Positive for activity change and appetite change. Negative for fatigue.   Respiratory:  Negative for cough and shortness of breath.    Cardiovascular:  Negative for chest pain and leg swelling.   Gastrointestinal:  Positive for abdominal pain and nausea.   Neurological:  Negative for weakness.   Psychiatric/Behavioral:  Negative for confusion.    All other systems reviewed and are negative.  Objective:     Vital Signs (Most Recent):  Temp: 98.9 °F (37.2 °C) (05/30/22 0734)  Pulse: 85 (05/30/22 0734)  Resp: 16 (05/30/22 0734)  BP: (!) 140/80 (05/30/22 0734)  SpO2: 96 % (05/30/22 0740)   Vital Signs (24h Range):  Temp:  [97.5 °F (36.4 °C)-99.1 °F (37.3 °C)] 98.9 °F (37.2 °C)  Pulse:  [85-95] 85  Resp:  [16-18] 16  SpO2:  [95 %-100 %] 96 %  BP: (119-140)/(56-80) 140/80     Weight: 78.6 kg (173 lb 4.5 oz)  Body mass index is 26.35 kg/m².  No intake or output data in the 24 hours ending 05/30/22 1157   Physical Exam  Vitals and nursing note reviewed.   Constitutional:       General: She is not in acute distress.  HENT:      Head: Normocephalic and atraumatic.      Right Ear: External ear normal.      Left Ear: External ear normal.      Nose: Nose normal.      Comments: Nasogastric tube.     Mouth/Throat:      Mouth: Mucous membranes are moist.      Pharynx: Oropharynx is clear.   Eyes:      Extraocular Movements: Extraocular movements intact.      Conjunctiva/sclera: Conjunctivae normal.   Cardiovascular:      Rate and Rhythm: Normal rate " and regular rhythm.      Pulses: Normal pulses.      Heart sounds: Normal heart sounds. No murmur heard.  Pulmonary:      Effort: Pulmonary effort is normal.      Breath sounds: Normal breath sounds.   Abdominal:      General: There is no distension.      Palpations: Abdomen is soft.      Tenderness: There is no guarding.      Comments: Abdominal wound clean, dry and intact.   Musculoskeletal:         General: Normal range of motion.      Cervical back: Normal range of motion and neck supple.      Right lower leg: No edema.      Left lower leg: No edema.   Skin:     General: Skin is warm and dry.      Coloration: Skin is not pale.      Findings: No rash.   Neurological:      General: No focal deficit present.      Mental Status: She is alert and oriented to person, place, and time. Mental status is at baseline.   Psychiatric:         Mood and Affect: Mood normal.         Behavior: Behavior normal.       Significant Labs: All pertinent labs within the past 24 hours have been reviewed.    Significant Imaging: I have reviewed all pertinent imaging results/findings within the past 24 hours.      Assessment/Plan:      * Small bowel obstruction  POD 3.  -NG tube clamped per Surgery  -CLQ diet; advance as tolerated  -TPN  -Surgery following  -PRN IV pain and nausea control  -PT/OT  -Spirometry      Vitamin D deficiency  -Ca and vitamin D repletion      HLD (hyperlipidemia)  -Continue ASA and statin      HTN (hypertension)  BP stable.  -Continue to monitor      DM (diabetes mellitus)  -SSI  -Detemir 15 units daily  -CLQ diet; TPN  -AC HS glucose checks  -Hypoglycemic precautions      Hypomagnesemia  -Replete PRN  -Telemetry  -Trend Mg           Severe malnutrition  -CMP trend  -Advance diet as tolerated  -TPN  -Prealbumin and triglyceride levels weekly        VTE Risk Mitigation (From admission, onward)         Ordered     enoxaparin injection 40 mg  Daily         05/29/22 0735     Place sequential compression device  Until  discontinued         05/24/22 1912     IP VTE LOW RISK PATIENT  Once         05/24/22 1912                Discharge Planning   STEFANIE: 6/1/2022     Code Status: Full Code   Is the patient medically ready for discharge?:     Reason for patient still in hospital (select all that apply): Patient trending condition, Consult recommendations and PT / OT recommendations  Discharge Plan A: Home                  Erwin Harmon MD  Department of Hospital Medicine   [facility]

## 2022-05-30 NOTE — PROGRESS NOTES
General Surgery Progress Note    Admit Date: 5/24/2022  S/P: Procedure(s) (LRB):  LAPAROTOMY, EXPLORATORY (N/A)  LYSIS, ADHESIONS (N/A)    Post-operative Day: 3 Days Post-Op    Hospital Day: 7    SUBJECTIVE:   NAEON. Transfered to floor. NGT has been camped. No nausea or vomiting but is belching. No flatus yet. Tolerating CLD. Would prefer to keep NGT in place until bowel function returns rather than remove and replace if n/v developed.    OBJECTIVE:     Vital Signs (Most Recent)  Temp:  [97.5 °F (36.4 °C)-99.1 °F (37.3 °C)] 98.9 °F (37.2 °C)  Pulse:  [85-95] 85  Resp:  [12-20] 16  SpO2:  [95 %-100 %] 95 %  BP: (119-140)/(56-90) 140/80    I&Os:  I/O last 3 completed shifts:  In: 3625.3 [P.O.:100; I.V.:2171.6; IV Piggyback:345]  Out: 2550 [Urine:1850; Drains:700]    Physical Exam:  Gen: NAD, AAOx3  HEENT: Anicteric sclera  Pulm: unlabored, symmetrical   Abd: soft, appropriate TTP, incision intact w/o SOI    Laboratory:  CBC:   Recent Labs   Lab 05/30/22  0629   WBC 4.78   RBC 2.97*   HGB 9.6*   HCT 28.4*      MCV 96   MCH 32.3*   MCHC 33.8     BMP:   Recent Labs   Lab 05/30/22  0629   *   *   K 3.9   CL 95   CO2 29   BUN 12   CREATININE 0.6   CALCIUM 8.6*     Labs within the past 24 hours have been reviewed.    ASSESSMENT/PLAN:     Patient Active Problem List    Diagnosis Date Noted    DM (diabetes mellitus) 05/29/2022    HTN (hypertension) 05/29/2022    HLD (hyperlipidemia) 05/29/2022    Severe malnutrition 05/28/2022    Hypomagnesemia 05/28/2022    Hypophosphatasia 05/28/2022    Hypocalcemia 05/28/2022    Small bowel obstruction 05/24/2022    Polyp of colon 12/15/2020    Pain in joint of left knee 02/26/2018    Degenerative arthritis of left knee 02/05/2018    Family history of colon cancer 10/05/2017    Osteoarthrosis 12/14/2012    Pain in both feet 12/14/2012    Tobacco dependence syndrome 12/14/2012    Vitamin D deficiency 12/14/2012         56 y.o. female s/p ex-lap with MARLO  for sbo  - still no bowel function, suspect post -op ileus  - continue CLD as tolerated  - NGT clamp and can be returned to suction if needed  - agree with TPN for now, will plan to advance diet once bowel function returns  - optimize electrolytes, encourage ambulation

## 2022-05-30 NOTE — SUBJECTIVE & OBJECTIVE
"Interval History: see "Hospital Course"    Review of Systems   Constitutional:  Positive for activity change and appetite change. Negative for fatigue.   Respiratory:  Negative for cough and shortness of breath.    Cardiovascular:  Negative for chest pain and leg swelling.   Gastrointestinal:  Positive for abdominal pain and nausea.   Neurological:  Negative for weakness.   Psychiatric/Behavioral:  Negative for confusion.    All other systems reviewed and are negative.  Objective:     Vital Signs (Most Recent):  Temp: 98.9 °F (37.2 °C) (05/30/22 0734)  Pulse: 85 (05/30/22 0734)  Resp: 16 (05/30/22 0734)  BP: (!) 140/80 (05/30/22 0734)  SpO2: 96 % (05/30/22 0740)   Vital Signs (24h Range):  Temp:  [97.5 °F (36.4 °C)-99.1 °F (37.3 °C)] 98.9 °F (37.2 °C)  Pulse:  [85-95] 85  Resp:  [16-18] 16  SpO2:  [95 %-100 %] 96 %  BP: (119-140)/(56-80) 140/80     Weight: 78.6 kg (173 lb 4.5 oz)  Body mass index is 26.35 kg/m².  No intake or output data in the 24 hours ending 05/30/22 1157   Physical Exam  Vitals and nursing note reviewed.   Constitutional:       General: She is not in acute distress.  HENT:      Head: Normocephalic and atraumatic.      Right Ear: External ear normal.      Left Ear: External ear normal.      Nose: Nose normal.      Comments: Nasogastric tube.     Mouth/Throat:      Mouth: Mucous membranes are moist.      Pharynx: Oropharynx is clear.   Eyes:      Extraocular Movements: Extraocular movements intact.      Conjunctiva/sclera: Conjunctivae normal.   Cardiovascular:      Rate and Rhythm: Normal rate and regular rhythm.      Pulses: Normal pulses.      Heart sounds: Normal heart sounds. No murmur heard.  Pulmonary:      Effort: Pulmonary effort is normal.      Breath sounds: Normal breath sounds.   Abdominal:      General: There is no distension.      Palpations: Abdomen is soft.      Tenderness: There is no guarding.      Comments: Abdominal wound clean, dry and intact.   Musculoskeletal:         " General: Normal range of motion.      Cervical back: Normal range of motion and neck supple.      Right lower leg: No edema.      Left lower leg: No edema.   Skin:     General: Skin is warm and dry.      Coloration: Skin is not pale.      Findings: No rash.   Neurological:      General: No focal deficit present.      Mental Status: She is alert and oriented to person, place, and time. Mental status is at baseline.   Psychiatric:         Mood and Affect: Mood normal.         Behavior: Behavior normal.       Significant Labs: All pertinent labs within the past 24 hours have been reviewed.    Significant Imaging: I have reviewed all pertinent imaging results/findings within the past 24 hours.

## 2022-05-30 NOTE — PLAN OF CARE
Problem: Adult Inpatient Plan of Care  Goal: Plan of Care Review  Outcome: Ongoing, Progressing     Problem: Adult Inpatient Plan of Care  Goal: Optimal Comfort and Wellbeing  Outcome: Ongoing, Progressing     Problem: Diabetes Comorbidity  Goal: Blood Glucose Level Within Targeted Range  Outcome: Ongoing, Progressing     Problem: Infection  Goal: Absence of Infection Signs and Symptoms  Outcome: Ongoing, Progressing     Problem: Fall Injury Risk  Goal: Absence of Fall and Fall-Related Injury  Outcome: Ongoing, Progressing     PRN Pain medication given, Blood glucose monitored and maintained to sliding scale, VSS, Cardiac monitored SR, NGT clamped, free from falls, resting between care.

## 2022-05-30 NOTE — CARE UPDATE
05/30/22 0740   PRE-TX-O2   O2 Device (Oxygen Therapy) room air   SpO2 96 %   Pulse Oximetry Type Intermittent   $ Pulse Oximetry - Multiple Charge Pulse Oximetry - Multiple   Incentive Spirometer   $ Incentive Spirometer Charges postop instruction   Administration (IS) instruction provided, initial   Number of Repetitions (IS) 10   Level Incentive Spirometer (mL) 1250   Patient Tolerance (IS) good

## 2022-05-30 NOTE — PLAN OF CARE
Problem: Physical Therapy  Goal: Physical Therapy Goal  Description: Goals to be met by: 22    Patient will increase functional independence with mobility by performin. Supine to sit with Contact Guard Assistance  2. Sit to stand transfer with Contact Guard Assistance  3. Bed to chair transfer with Contact Guard Assistance using Rolling Walker  4. Gait  x 250 feet with Contact Guard Assistance using Rolling Walker.   5. Lower extremity exercise program x20 reps   Outcome: Ongoing, Progressing

## 2022-05-30 NOTE — PLAN OF CARE
Goals to be met by: 6/06/2022     Patient will increase functional independence with ADLs by performing:    LE Dressing with Modified [place].  Grooming while standing with Modified [place].  Toileting from toilet with Modified [place] for hygiene and clothing management.   Toilet transfer to toilet with Modified [place].    OT POC initiated and established.

## 2022-05-30 NOTE — PROGRESS NOTES
"  [facility]  Adult Nutrition  Consult Note    SUMMARY   Intervention: parenteral nutrition therapy       Recommendations  Recommendation/Intervention:   1.) Recommend Custom TPN 5%AA/D15 @ goal rate 60 mls/hr continuous + 20% lipids (250 mls) daily providing 1522 kcals/day, 72 g protein/day, and 1440 mls fluid/day. GIR: 1.9 mg/kg/min.     Goals: 1.) diet will advance within 48 hrs 2.) pt will meet >50% of EEN during admit   Nutrition Goal Status: 1.) progressing toward goal 2.) new   Communication of RD Recs: other (comment) (POC)    1. Small bowel obstruction    2. DKA (diabetic ketoacidosis)    3. Diabetic ketoacidosis without coma associated with other specified diabetes mellitus      Past Medical History:   Diagnosis Date    Arthritis     Diabetes mellitus     Elevated cholesterol     Hypertension          Assessment and Plan  Nutrition Problem  Inadequate energy intake    Related to (etiology):   DKA    Signs and Symptoms (as evidenced by):   NPO x1 day    Interventions/Recommendations (treatment strategy):  Continue with TPN     Nutrition Diagnosis Status:   Improving        Malnutrition Assessment  Body mass index is 26.35 kg/m².  NPO x1 day    Reason for Assessment  Reason For Assessment: RD follow up   General Information Comments: admits with DKA. RD consulted for carb counting education. Remains NPO, gen surgery consulted for SBO. Ng in place w/ min output. Will f/u with education once diet advanced.  5/30: x3 days post op of ex lap, lysis adhesions. Tolerating clear liquids + TPN ordered, awaiting bowel function. NGT clamped. Will continue to monitor for diet advancement.     Nutrition Risk Screen  Nutrition Risk Screen: no indicators present    Nutrition/Diet History  Food Allergies: NKFA  Factors Affecting Nutritional Intake: NPO    Anthropometrics  Temp: 98.9 °F (37.2 °C)  Height: 5' 8"  Height (inches): 68 in  Weight Method: Bed Scale  Weight: 78.6 kg (173 lb 4.5 oz)  Weight " (lb): 173.28 lb  Ideal Body Weight (IBW), Female: 140 lb  % Ideal Body Weight, Female (lb): 110.7 %  BMI (Calculated): 26.4  BMI Grade: 18.5-24.9 - normal  Usual Body Weight (UBW), k kg  % Usual Body Weight: 100.64  % Weight Change From Usual Weight: 0.43 %       Lab/Procedures/Meds  Pertinent Labs Reviewed: reviewed  BMP  Lab Results   Component Value Date     (L) 2022    K 3.9 2022    CL 95 2022    CO2 29 2022    BUN 12 2022    CREATININE 0.6 2022    CALCIUM 8.6 (L) 2022    ANIONGAP 9 2022    ESTGFRAFRICA >60 2022    EGFRNONAA >60 2022     Lab Results   Component Value Date    ALBUMIN 1.8 (L) 2022     Lab Results   Component Value Date    CALCIUM 8.6 (L) 2022    PHOS 4.2 2022     Recent Labs   Lab 22  0802   POCTGLUCOSE 245*       Pertinent Medications Reviewed: reviewed  Scheduled Meds:   aspirin  81 mg Oral Daily    atorvastatin  20 mg Oral Daily    calcium-vitamin D3  1 tablet Oral Daily    enoxaparin  40 mg Subcutaneous Daily    insulin detemir U-100  10 Units Subcutaneous Daily    mupirocin   Nasal BID    sodium chloride 0.9%  10 mL Intravenous Q6H     Continuous Infusions:   Amino acid 5% - dextrose 15% (CLINIMIX-E) solution with additives.  CENTRAL LINE ONLY (1395 mOsm/L) 50 mL/hr at 22 2243         Estimated/Assessed Needs  Weight Used For Calorie Calculations: 70.3 kg (154 lb 15.7 oz)  Energy Calorie Requirements (kcal): 1800 kcals  Energy Need Method: [place] (x1.3af)  Protein Requirements: 70-85g  Weight Used For Protein Calculations: 70.3 kg (154 lb 15.7 oz)  Estimated Fluid Requirement Method: RDA Method  RDA Method (mL): 1800      Nutrition Prescription Ordered  Current Diet Order: Clear Liquids  Parenteral Orders: Clinimix 5/15 + 20% fat emulsions  Parenteral Rate: 50 mls  Parenteral Frequency: continuous     Evaluation of Received Nutrient/Fluid Intake  Parenteral Calories: 1352  kcals  Parenteral Protein: 60 g  Parenteral Fluid: 1200 mls  GIR: 1.5 mg/kg/min   Tolerance: tolerating  % Intake of Estimated Energy Needs: 75 - 100 %  % Meal Intake: NPO    Nutrition Risk  Level of Risk/Frequency of Follow-up:  (x2 weekly)       Monitor and Evaluation  Food and Nutrient Intake: energy intake, food and beverage intake  Food and Nutrient Adminstration: diet order  Anthropometric Measurements: weight, weight change, body mass index  Biochemical Data, Medical Tests and Procedures: electrolyte and renal panel, glucose/endocrine profile, lipid profile  Nutrition-Focused Physical Findings: overall appearance       Nutrition Follow-Up  RD Follow-up?: Yes

## 2022-05-30 NOTE — CARE UPDATE
"   05/30/22 0722   Tobacco Cessation Intervention   Do you use any type of tobacco product? Yes   Are you interested in quitting use of tobacco products? Thinking about quitting   Are you interested in Nicotine Replacement for symptom relief? No   $ Smoking Cessation Charges Smoking Cessation - Intermediate (CTTS)   Patient states "she has not smoke in a month due to her illness and prays to God when she get home she'll stay quit".  No Nicotine patch needed.  "

## 2022-05-30 NOTE — PLAN OF CARE
Problem: Physical Therapy  Goal: Physical Therapy Goal  Description: Goals to be met by: 22    Patient will increase functional independence with mobility by performin. Supine to sit with Contact Guard Assistance  2. Sit to stand transfer with Contact Guard Assistance  3. Bed to chair transfer with Contact Guard Assistance using Rolling Walker  4. Gait  x 250 feet with Contact Guard Assistance using Rolling Walker.   5. Lower extremity exercise program x20 reps   2022 1431 by TITO Sanford  Outcome: Ongoing, Progressing     Pt ambulated 250ft with RW and min A. Pt demonstrates WFL of B LE strength and ROM. Recommend HH and d/c.

## 2022-05-30 NOTE — PLAN OF CARE
Intervention: parenteral nutrition therapy         Recommendations  Recommendation/Intervention:   1.) Recommend Custom TPN 5%AA/D15 @ goal rate 60 mls/hr continuous + 20% lipids (250 mls) daily providing 1522 kcals/day, 72 g protein/day, and 1440 mls fluid/day. GIR: 1.9 mg/kg/min.      Goals: 1.) diet will advance within 48 hrs 2.) pt will meet >50% of EEN during admit   Nutrition Goal Status: 1.) progressing toward goal 2.) new   Communication of RD Recs: other (comment) (POC)

## 2022-05-30 NOTE — ASSESSMENT & PLAN NOTE
POD 3.  -NG tube clamped per Surgery  -CLQ diet; advance as tolerated  -TPN  -Surgery following  -PRN IV pain and nausea control  -PT/OT  -Spirometry

## 2022-05-30 NOTE — PT/OT/SLP EVAL
Physical Therapy Evaluation    Patient Name:  Brooks Browning   MRN:  [id number]    Recommendations:     Discharge Recommendations:  HH PT, home   Discharge Equipment Recommendations: walker, rolling   Barriers to discharge: None    Assessment:     Brooks Browning is a 56 y.o. female admitted with a medical diagnosis of Small bowel obstruction.  She presents with the following impairments/functional limitations:  weakness, impaired endurance, impaired functional mobilty, gait instability, impaired balance, decreased ROM.    Pt seen for PT eval. Pt is alert and agreeable to therapy with encouragement. She presents with NG tube in place. Pt demonstrated WFL of B LE strength and ROM. Pt performed bed mobility (supine to sit) and transfers (sit to stand) with CGA to min A with support from RW.  She ambulated 250ft with RW and min A with one to two quick, standing rest breaks. Recommend pt d/c home.     Rehab Prognosis: Fair; patient would benefit from acute skilled PT services to address these deficits and reach maximum level of function.    Recent Surgery: Procedure(s) (LRB):  LAPAROTOMY, EXPLORATORY (N/A)  LYSIS, ADHESIONS (N/A) 3 Days Post-Op    Plan:     During this hospitalization, patient to be seen 6 x/week to address the identified rehab impairments via gait training, therapeutic activities, therapeutic exercises and progress toward the following goals:    · Plan of Care Expires:  06/30/22    Subjective   Pt states that she is fatigued and is hesitant to perform therapy.  Chief Complaint: none stated   Patient/Family Comments/goals: none stated   Pain/Comfort:  · Pain Rating 1: 0/10    Patients cultural, spiritual, Caodaism conflicts given the current situation:      Living Environment:  Pt lives with son in a one story home.   Prior to admission, patients level of function was independent with ADLs and drives herself.  Equipment used at home:  .  DME owned (not currently used): none.  Upon discharge, patient  will have assistance from family.    Objective:     Communicated with nurse Calderón prior to session.  Patient found with HOB elevated     upon PT entry to room.    General Precautions: Standard,     Orthopedic Precautions:    Braces:    Respiratory Status: Room air    Exams:  · RLE ROM: WFL  · RLE Strength: WFL  · LLE ROM: WFL  · LLE Strength: WFL    Functional Mobility:  · Bed Mobility:     · Sit to Supine: minimum assistance  · Transfers:     · Sit to Stand:  minimum assistance with rolling walker  · Gait: Pt ambulated 250ft with RW and min assist.     Therapeutic Activities and Exercises:   PT encouraged pt to perform ankle pumps and educated pt on the detrimental affects of prolonged bed rest.     AM-PAC 6 CLICK MOBILITY  Total Score:19     Patient left up in chair with all lines intact, call button in reach and chair alarm on.    GOALS:   Multidisciplinary Problems     Physical Therapy Goals        Problem: Physical Therapy    Goal Priority Disciplines Outcome Goal Variances Interventions   Physical Therapy Goal     PT, PT/OT Ongoing, Progressing     Description: Goals to be met by: 22    Patient will increase functional independence with mobility by performin. Supine to sit with Contact Guard Assistance  2. Sit to stand transfer with Contact Guard Assistance  3. Bed to chair transfer with Contact Guard Assistance using Rolling Walker  4. Gait  x 250 feet with Contact Guard Assistance using Rolling Walker.   5. Lower extremity exercise program x20 reps                    History:     Past Medical History:   Diagnosis Date    Arthritis     Diabetes mellitus     Elevated cholesterol     Hypertension        Past Surgical History:   Procedure Laterality Date     SECTION      HIP SURGERY Right     HYSTERECTOMY      JOINT REPLACEMENT Bilateral     hip    LYSIS OF ADHESIONS N/A 2022    Procedure: LYSIS, ADHESIONS;  Surgeon: Juve Anne Jr., MD;  Location: [facility];  Service:  General;  Laterality: N/A;       Time Tracking:     PT Received On: 05/30/22  PT Start Time: 1023     PT Stop Time: 1044  PT Total Time (min): 21 min     Billable Minutes: Evaluation 10 and Gait Training 11      05/30/2022

## 2022-05-31 LAB
ALBUMIN SERPL BCP-MCNC: 1.9 G/DL (ref 3.5–5.2)
ALP SERPL-CCNC: 55 U/L (ref 55–135)
ALT SERPL W/O P-5'-P-CCNC: 13 U/L (ref 10–44)
ANION GAP SERPL CALC-SCNC: 10 MMOL/L (ref 8–16)
AST SERPL-CCNC: 18 U/L (ref 10–40)
BASOPHILS # BLD AUTO: 0.02 K/UL (ref 0–0.2)
BASOPHILS NFR BLD: 0.5 % (ref 0–1.9)
BILIRUB SERPL-MCNC: 0.3 MG/DL (ref 0.1–1)
BUN SERPL-MCNC: 12 MG/DL (ref 6–20)
CALCIUM SERPL-MCNC: 9.1 MG/DL (ref 8.7–10.5)
CHLORIDE SERPL-SCNC: 96 MMOL/L (ref 95–110)
CO2 SERPL-SCNC: 29 MMOL/L (ref 23–29)
CREAT SERPL-MCNC: 0.6 MG/DL (ref 0.5–1.4)
DIFFERENTIAL METHOD: ABNORMAL
EOSINOPHIL # BLD AUTO: 0.1 K/UL (ref 0–0.5)
EOSINOPHIL NFR BLD: 2.4 % (ref 0–8)
ERYTHROCYTE [DISTWIDTH] IN BLOOD BY AUTOMATED COUNT: 12.3 % (ref 11.5–14.5)
EST. GFR  (AFRICAN AMERICAN): >60 ML/MIN/1.73 M^2
EST. GFR  (NON AFRICAN AMERICAN): >60 ML/MIN/1.73 M^2
GLUCOSE SERPL-MCNC: 128 MG/DL (ref 70–110)
HCT VFR BLD AUTO: 28.5 % (ref 37–48.5)
HGB BLD-MCNC: 9.7 G/DL (ref 12–16)
IMM GRANULOCYTES # BLD AUTO: 0.04 K/UL (ref 0–0.04)
IMM GRANULOCYTES NFR BLD AUTO: 1 % (ref 0–0.5)
LYMPHOCYTES # BLD AUTO: 1.3 K/UL (ref 1–4.8)
LYMPHOCYTES NFR BLD: 30.8 % (ref 18–48)
MAGNESIUM SERPL-MCNC: 1.5 MG/DL (ref 1.6–2.6)
MCH RBC QN AUTO: 32.3 PG (ref 27–31)
MCHC RBC AUTO-ENTMCNC: 34 G/DL (ref 32–36)
MCV RBC AUTO: 95 FL (ref 82–98)
MONOCYTES # BLD AUTO: 0.5 K/UL (ref 0.3–1)
MONOCYTES NFR BLD: 11.1 % (ref 4–15)
NEUTROPHILS # BLD AUTO: 2.3 K/UL (ref 1.8–7.7)
NEUTROPHILS NFR BLD: 54.2 % (ref 38–73)
NRBC BLD-RTO: 1 /100 WBC
PHOSPHATE SERPL-MCNC: 5.1 MG/DL (ref 2.7–4.5)
PLATELET # BLD AUTO: 194 K/UL (ref 150–450)
PLATELET BLD QL SMEAR: ABNORMAL
PMV BLD AUTO: 9.5 FL (ref 9.2–12.9)
POCT GLUCOSE: 163 MG/DL (ref 70–110)
POCT GLUCOSE: 168 MG/DL (ref 70–110)
POCT GLUCOSE: 67 MG/DL (ref 70–110)
POCT GLUCOSE: 69 MG/DL (ref 70–110)
POCT GLUCOSE: 80 MG/DL (ref 70–110)
POTASSIUM SERPL-SCNC: 3.3 MMOL/L (ref 3.5–5.1)
PROT SERPL-MCNC: 4.8 G/DL (ref 6–8.4)
RBC # BLD AUTO: 3 M/UL (ref 4–5.4)
SODIUM SERPL-SCNC: 135 MMOL/L (ref 136–145)
WBC # BLD AUTO: 4.16 K/UL (ref 3.9–12.7)

## 2022-05-31 PROCEDURE — 94799 UNLISTED PULMONARY SVC/PX: CPT

## 2022-05-31 PROCEDURE — 12000002 HC ACUTE/MED SURGE SEMI-PRIVATE ROOM

## 2022-05-31 PROCEDURE — 63600175 PHARM REV CODE 636 W HCPCS: Performed by: STUDENT IN AN ORGANIZED HEALTH CARE EDUCATION/TRAINING PROGRAM

## 2022-05-31 PROCEDURE — 97116 GAIT TRAINING THERAPY: CPT | Mod: CQ

## 2022-05-31 PROCEDURE — 83735 ASSAY OF MAGNESIUM: CPT | Performed by: STUDENT IN AN ORGANIZED HEALTH CARE EDUCATION/TRAINING PROGRAM

## 2022-05-31 PROCEDURE — 99900035 HC TECH TIME PER 15 MIN (STAT)

## 2022-05-31 PROCEDURE — A4216 STERILE WATER/SALINE, 10 ML: HCPCS | Performed by: STUDENT IN AN ORGANIZED HEALTH CARE EDUCATION/TRAINING PROGRAM

## 2022-05-31 PROCEDURE — 84100 ASSAY OF PHOSPHORUS: CPT | Performed by: STUDENT IN AN ORGANIZED HEALTH CARE EDUCATION/TRAINING PROGRAM

## 2022-05-31 PROCEDURE — 94761 N-INVAS EAR/PLS OXIMETRY MLT: CPT

## 2022-05-31 PROCEDURE — 80053 COMPREHEN METABOLIC PANEL: CPT | Performed by: STUDENT IN AN ORGANIZED HEALTH CARE EDUCATION/TRAINING PROGRAM

## 2022-05-31 PROCEDURE — 25000003 PHARM REV CODE 250: Performed by: STUDENT IN AN ORGANIZED HEALTH CARE EDUCATION/TRAINING PROGRAM

## 2022-05-31 PROCEDURE — 36415 COLL VENOUS BLD VENIPUNCTURE: CPT | Performed by: STUDENT IN AN ORGANIZED HEALTH CARE EDUCATION/TRAINING PROGRAM

## 2022-05-31 PROCEDURE — 85025 COMPLETE CBC W/AUTO DIFF WBC: CPT | Performed by: STUDENT IN AN ORGANIZED HEALTH CARE EDUCATION/TRAINING PROGRAM

## 2022-05-31 RX ORDER — POTASSIUM CHLORIDE 7.45 MG/ML
10 INJECTION INTRAVENOUS
Status: COMPLETED | OUTPATIENT
Start: 2022-05-31 | End: 2022-05-31

## 2022-05-31 RX ORDER — MAGNESIUM SULFATE HEPTAHYDRATE 40 MG/ML
4 INJECTION, SOLUTION INTRAVENOUS ONCE
Status: COMPLETED | OUTPATIENT
Start: 2022-05-31 | End: 2022-05-31

## 2022-05-31 RX ORDER — POTASSIUM CHLORIDE 7.45 MG/ML
40 INJECTION INTRAVENOUS
Status: DISCONTINUED | OUTPATIENT
Start: 2022-05-31 | End: 2022-05-31 | Stop reason: SDUPTHER

## 2022-05-31 RX ADMIN — POTASSIUM CHLORIDE 10 MEQ: 7.46 INJECTION, SOLUTION INTRAVENOUS at 02:05

## 2022-05-31 RX ADMIN — OXYCODONE AND ACETAMINOPHEN 1 TABLET: 7.5; 325 TABLET ORAL at 06:05

## 2022-05-31 RX ADMIN — POTASSIUM CHLORIDE 10 MEQ: 7.46 INJECTION, SOLUTION INTRAVENOUS at 04:05

## 2022-05-31 RX ADMIN — OXYCODONE AND ACETAMINOPHEN 1 TABLET: 7.5; 325 TABLET ORAL at 11:05

## 2022-05-31 RX ADMIN — OXYCODONE AND ACETAMINOPHEN 1 TABLET: 7.5; 325 TABLET ORAL at 12:05

## 2022-05-31 RX ADMIN — ALTEPLASE 2 MG: 2.2 INJECTION, POWDER, LYOPHILIZED, FOR SOLUTION INTRAVENOUS at 04:05

## 2022-05-31 RX ADMIN — MAGNESIUM SULFATE 4 G: 2 INJECTION INTRAVENOUS at 11:05

## 2022-05-31 RX ADMIN — MAGNESIUM SULFATE 4 G: 2 INJECTION INTRAVENOUS at 04:05

## 2022-05-31 RX ADMIN — SODIUM CHLORIDE, PRESERVATIVE FREE 10 ML: 5 INJECTION INTRAVENOUS at 12:05

## 2022-05-31 RX ADMIN — Medication 1 TABLET: at 09:05

## 2022-05-31 RX ADMIN — LISINOPRIL 20 MG: 10 TABLET ORAL at 09:05

## 2022-05-31 RX ADMIN — ASPIRIN 81 MG CHEWABLE TABLET 81 MG: 81 TABLET CHEWABLE at 09:05

## 2022-05-31 RX ADMIN — HYDROCHLOROTHIAZIDE 12.5 MG: 12.5 TABLET ORAL at 09:05

## 2022-05-31 RX ADMIN — ATORVASTATIN CALCIUM 20 MG: 20 TABLET, FILM COATED ORAL at 09:05

## 2022-05-31 RX ADMIN — HYDROMORPHONE HYDROCHLORIDE 0.5 MG: 1 INJECTION, SOLUTION INTRAMUSCULAR; INTRAVENOUS; SUBCUTANEOUS at 11:05

## 2022-05-31 NOTE — CARE UPDATE
05/30/22 1947   Patient Assessment/Suction   Level of Consciousness (AVPU) alert   Respiratory Effort Unlabored   PRE-TX-O2   O2 Device (Oxygen Therapy) room air   SpO2 99 %   Pulse Oximetry Type Intermittent   $ Pulse Oximetry - Multiple Charge Pulse Oximetry - Multiple   Pulse 76   Resp 17   Incentive Spirometer   $ Incentive Spirometer Charges done with encouragement   Administration (IS) proper technique demonstrated   Number of Repetitions (IS) 10   Level Incentive Spirometer (mL) 1000   Patient Tolerance (IS) good

## 2022-05-31 NOTE — PROGRESS NOTES
General Surgery Progress Note    Admit Date: 5/24/2022  S/P: Procedure(s) (LRB):  LAPAROTOMY, EXPLORATORY (N/A)  LYSIS, ADHESIONS (N/A)    Post-operative Day: 4 Days Post-Op    Hospital Day: 8    SUBJECTIVE:   Patient reports passing flatus but no bowel movement.  Tolerated clear liquid diet without nausea or vomiting.  NG tube has remained clamped.  No significant pain.  Had lower extremity edema she attributed to diuretic being held but is now improving.    OBJECTIVE:     Vital Signs (Most Recent)  Temp:  [97 °F (36.1 °C)-98.9 °F (37.2 °C)] 97.2 °F (36.2 °C)  Pulse:  [67-85] 74  Resp:  [16-18] 18  SpO2:  [95 %-100 %] 100 %  BP: (116-161)/(65-86) 131/86    I&Os:  No intake/output data recorded.    Physical Exam:  Gen: NAD, AAOx3  HEENT: Anicteric sclera  Pulm: unlabored, symmetrical   Abd: soft, appropriate TTP, incision intact w/o SOI  EXT:  Trace bilateral lower extremity pitting edema    Laboratory:  CBC:   Recent Labs   Lab 05/31/22  0456   WBC 4.16   RBC 3.00*   HGB 9.7*   HCT 28.5*      MCV 95   MCH 32.3*   MCHC 34.0     BMP:   Recent Labs   Lab 05/31/22  0456   *   *   K 3.3*   CL 96   CO2 29   BUN 12   CREATININE 0.6   CALCIUM 9.1     Labs within the past 24 hours have been reviewed.    ASSESSMENT/PLAN:     Patient Active Problem List    Diagnosis Date Noted    DM (diabetes mellitus) 05/29/2022    HTN (hypertension) 05/29/2022    HLD (hyperlipidemia) 05/29/2022    Severe malnutrition 05/28/2022    Hypomagnesemia 05/28/2022    Small bowel obstruction 05/24/2022    Polyp of colon 12/15/2020    Pain in joint of left knee 02/26/2018    Degenerative arthritis of left knee 02/05/2018    Family history of colon cancer 10/05/2017    Osteoarthrosis 12/14/2012    Pain in both feet 12/14/2012    Tobacco dependence syndrome 12/14/2012    Vitamin D deficiency 12/14/2012         56 y.o. female s/p ex-lap with MARLO for sbo  - patient has started passing flatus  - NG tube removed  - will  advance to diabetic diet with boost glucose control supplements  - would discontinue TPN once p.o. intake adequate  - encourage out of bed to chair and ambulation  - possible discharge home tomorrow if tolerating a diet

## 2022-05-31 NOTE — PROGRESS NOTES
[facility] Hospital Medicine  Progress Note    Patient Name: Brooks Browning  MRN: [id number]  Patient Class: IP- Inpatient   Admission Date: 2022  Length of Stay: 7 days  Attending Physician: Erwin Harmon MD  Primary Care Provider: Sofía Murcia MD        Subjective:     Principal Problem:Small bowel obstruction        HPI:  Patient is a 56-year-old  female with a past medical history significant for type 2 diabetes primarily dependent on metformin, hypertension, and multiple joint replacements who presents to the hospital with approximately 1 week of abdominal pain and vomiting.  Patient states that she initially started having abdominal pain approximately 6 days prior to admission with intermittent pain worsening to constant pain with bilious vomiting.  Patient denies any diarrhea or other GI complaints.  Denies any fever or other systemic symptoms.  Patient states that she has never had any episode like this previously.  She initially thought it was a stomach flu , but as it worsened, she presented to the emergency department.  At the outside emergency department, the patient was found to be in diabetic ketoacidosis with elevated beta hydroxybutyrate as well as metabolic acidosis and she was also found to have a secondary small-bowel obstruction.  Patient does have a history of  section in the past.  Patient was transferred from [facility] to [facility] for treatment of her various medical issues.      Overview/Hospital Course:  Brooks Browning is a 56 year old female with a past medical history of HTN, HLD, DM, and anemia who was admitted to the hospital with small-bowel obstruction (history of ) and diabetic ketoacidosis. She was started on insulin drip per DKA protocol upon admission and was evaluated by General Surgery. A nasogastric tube was inserted, yet she continued to have significant output through the NG tube, and  "a repeat contrast study noted no evidence of progression of contrast through the small bowel. The patient subsequently underwent exploratory laparotomy on 05/27 with lysis of adhesions. She was started on TPN. Her DKA has resolved. She was started on a clear liquid diet per Surgery. She was moved out of the ICU 5/29/2022. The NG tube was removed 5/31 and her diet was advanced to diabetic as she began to pass flatus.       Interval History: see "Hospital Course"    Review of Systems   Constitutional:  Positive for activity change and appetite change. Negative for fatigue.   Respiratory:  Negative for cough and shortness of breath.    Cardiovascular:  Negative for chest pain and leg swelling.   Gastrointestinal:  Positive for abdominal pain. Negative for nausea and vomiting.   Neurological:  Negative for weakness.   Psychiatric/Behavioral:  Negative for confusion.    All other systems reviewed and are negative.  Objective:     Vital Signs (Most Recent):  Temp: 97.6 °F (36.4 °C) (05/31/22 0800)  Pulse: 71 (05/31/22 0800)  Resp: 18 (05/31/22 0800)  BP: 133/82 (05/31/22 0800)  SpO2: 100 % (05/31/22 0800) Vital Signs (24h Range):  Temp:  [97 °F (36.1 °C)-98.9 °F (37.2 °C)] 97.6 °F (36.4 °C)  Pulse:  [67-84] 71  Resp:  [16-18] 18  SpO2:  [97 %-100 %] 100 %  BP: (116-161)/(65-86) 133/82     Weight: 78.6 kg (173 lb 4.5 oz)  Body mass index is 26.35 kg/m².    Intake/Output Summary (Last 24 hours) at 5/31/2022 1005  Last data filed at 5/31/2022 0700  Gross per 24 hour   Intake 140 ml   Output --   Net 140 ml      Physical Exam  Vitals and nursing note reviewed.   Constitutional:       General: She is not in acute distress.  HENT:      Head: Normocephalic and atraumatic.      Right Ear: External ear normal.      Left Ear: External ear normal.      Nose: Nose normal.      Mouth/Throat:      Mouth: Mucous membranes are moist.      Pharynx: Oropharynx is clear.   Eyes:      Extraocular Movements: Extraocular movements intact.      " Conjunctiva/sclera: Conjunctivae normal.   Cardiovascular:      Rate and Rhythm: Normal rate and regular rhythm.      Pulses: Normal pulses.      Heart sounds: Normal heart sounds. No murmur heard.  Pulmonary:      Effort: Pulmonary effort is normal.      Breath sounds: Normal breath sounds.   Abdominal:      General: There is no distension.      Palpations: Abdomen is soft.      Tenderness: There is no guarding.      Comments: Abdominal wound clean, dry and intact.   Musculoskeletal:         General: Normal range of motion.      Cervical back: Normal range of motion and neck supple.      Right lower leg: No edema.      Left lower leg: No edema.   Skin:     General: Skin is warm and dry.      Coloration: Skin is not pale.      Findings: No rash.   Neurological:      General: No focal deficit present.      Mental Status: She is alert and oriented to person, place, and time. Mental status is at baseline.   Psychiatric:         Mood and Affect: Mood normal.         Behavior: Behavior normal.       Significant Labs: All pertinent labs within the past 24 hours have been reviewed.    Significant Imaging: I have reviewed all pertinent imaging results/findings within the past 24 hours.      Assessment/Plan:      * Small bowel obstruction  POD 4. Passing flatus.  -NG tube removed per Surgery  -Diabetic diet; advance as tolerated  -TPN not reordered  -Surgery following  -PRN IV pain and nausea control  -PT/OT  -Spirometry      Vitamin D deficiency  -Ca and vitamin D repletion      HLD (hyperlipidemia)  -Continue ASA and statin      HTN (hypertension)  BP stable.  -Continue to monitor  -Lisinopril  -HCTZ      DM (diabetes mellitus)  -SSI  -Detemir 15 units daily  -Diabetic diet; TPN not reordered  -AC HS glucose checks  -Hypoglycemic precautions      Hypomagnesemia  -Replete PRN  -Telemetry  -Trend Mg           Severe malnutrition  -CMP trend  -Advance diet as tolerated; on diabetic diet  -TPN ends 5/30  -Prealbumin and  triglyceride levels weekly      Hypokalemia  -Replete PRN  -Trend K  -Telemetry          VTE Risk Mitigation (From admission, onward)         Ordered     enoxaparin injection 40 mg  Daily         05/29/22 0735     Place sequential compression device  Until discontinued         05/24/22 1912     IP VTE LOW RISK PATIENT  Once         05/24/22 1912                Discharge Planning   STEFANIE: 6/2/2022     Code Status: Full Code   Is the patient medically ready for discharge?:     Reason for patient still in hospital (select all that apply): Patient trending condition and Consult recommendations  Discharge Plan A: Home                  Erwin Harmon MD  Department of Hospital Medicine   [facility]

## 2022-05-31 NOTE — ASSESSMENT & PLAN NOTE
-CMP trend  -Advance diet as tolerated; on diabetic diet  -TPN ends 5/30  -Prealbumin and triglyceride levels weekly

## 2022-05-31 NOTE — NURSING
Blood glucose monitored WNL, SSI given. NGT remains clamped, patient free from falls sitting up in the chair with family at the bedside. Will continue to monitor for changes.    Left arm;

## 2022-05-31 NOTE — ANESTHESIA PREPROCEDURE EVALUATION
05/31/2022  Brooks Browning is a 56 y.o., female.    Pre-op Assessment    I have reviewed the Patient Summary Reports.    I have reviewed the Nursing Notes.    I have reviewed the Medications.     Review of Systems  Anesthesia Hx:  No problems with previous Anesthesia    Social:  Non-Smoker    Cardiovascular:   Hypertension, well controlled ECG has been reviewed.    Pulmonary:  Pulmonary Normal    Hepatic/GI:   Bowel obstruction with free air    Musculoskeletal:   Arthritis     Neurological:   Neuromuscular Disease,    Endocrine:   Diabetes, well controlled        Physical Exam  General:  Well nourished      Airway/Jaw/Neck:  Airway Findings: Mouth Opening: Normal   Tongue: Normal   General Airway Assessment: Adult Mallampati: II  TM Distance: Normal, at least 6 cm   Jaw/Neck Findings:     Neck ROM: Normal ROM       Dental:  Dental Findings: In tact               Anesthesia Plan  Type of Anesthesia, risks & benefits discussed:  Anesthesia Type:  general    Patient's Preference:   Plan Factors:          Intra-op Monitoring Plan:   Intra-op Monitoring Plan Comments:   Post Op Pain Control Plan: IV/PO Opioids PRN and multimodal analgesia  Post Op Pain Control Plan Comments:     Induction:   IV and rapid sequence  Beta Blocker:         Informed Consent: Informed consent signed with the Patient and all parties understand the risks and agree with anesthesia plan.  All questions answered.  Anesthesia consent signed with patient.  ASA Score: 3   Emergent   Day of Surgery Review of History & Physical:        Anesthesia Plan Notes: Case cxd 1/19 secondary to equipment issues.        Ready For Surgery From Anesthesia Perspective.           Physical Exam  General: Well nourished    Airway:  Mallampati: II   Mouth Opening: Normal  TM Distance: Normal, at least 6 cm  Tongue: Normal  Neck ROM: Normal ROM    Dental:  In  tact          Anesthesia Plan  Type of Anesthesia, risks & benefits discussed:    Anesthesia Type: general  Post Op Pain Control Plan: IV/PO Opioids PRN and multimodal analgesia  Induction:  IV and rapid sequence  Airway Plan: Direct, Post-Induction  Informed Consent: Informed consent signed with the Patient and all parties understand the risks and agree with anesthesia plan.  All questions answered. Patient consented to blood products? Yes  ASA Score: 3 Emergent  Anesthesia Plan Notes: Case cxd 1/19 secondary to equipment issues.    Ready For Surgery From Anesthesia Perspective.       .

## 2022-05-31 NOTE — ASSESSMENT & PLAN NOTE
POD 4. Passing flatus.  -NG tube removed per Surgery  -Diabetic diet; advance as tolerated  -TPN not reordered  -Surgery following  -PRN IV pain and nausea control  -PT/OT  -Spirometry

## 2022-05-31 NOTE — ASSESSMENT & PLAN NOTE
-SSI  -Detemir 15 units daily  -Diabetic diet; TPN not reordered  -AC HS glucose checks  -Hypoglycemic precautions

## 2022-05-31 NOTE — SUBJECTIVE & OBJECTIVE
"Interval History: see "Hospital Course"    Review of Systems   Constitutional:  Positive for activity change and appetite change. Negative for fatigue.   Respiratory:  Negative for cough and shortness of breath.    Cardiovascular:  Negative for chest pain and leg swelling.   Gastrointestinal:  Positive for abdominal pain. Negative for nausea and vomiting.   Neurological:  Negative for weakness.   Psychiatric/Behavioral:  Negative for confusion.    All other systems reviewed and are negative.  Objective:     Vital Signs (Most Recent):  Temp: 97.6 °F (36.4 °C) (05/31/22 0800)  Pulse: 71 (05/31/22 0800)  Resp: 18 (05/31/22 0800)  BP: 133/82 (05/31/22 0800)  SpO2: 100 % (05/31/22 0800) Vital Signs (24h Range):  Temp:  [97 °F (36.1 °C)-98.9 °F (37.2 °C)] 97.6 °F (36.4 °C)  Pulse:  [67-84] 71  Resp:  [16-18] 18  SpO2:  [97 %-100 %] 100 %  BP: (116-161)/(65-86) 133/82     Weight: 78.6 kg (173 lb 4.5 oz)  Body mass index is 26.35 kg/m².    Intake/Output Summary (Last 24 hours) at 5/31/2022 1005  Last data filed at 5/31/2022 0700  Gross per 24 hour   Intake 140 ml   Output --   Net 140 ml      Physical Exam  Vitals and nursing note reviewed.   Constitutional:       General: She is not in acute distress.  HENT:      Head: Normocephalic and atraumatic.      Right Ear: External ear normal.      Left Ear: External ear normal.      Nose: Nose normal.      Mouth/Throat:      Mouth: Mucous membranes are moist.      Pharynx: Oropharynx is clear.   Eyes:      Extraocular Movements: Extraocular movements intact.      Conjunctiva/sclera: Conjunctivae normal.   Cardiovascular:      Rate and Rhythm: Normal rate and regular rhythm.      Pulses: Normal pulses.      Heart sounds: Normal heart sounds. No murmur heard.  Pulmonary:      Effort: Pulmonary effort is normal.      Breath sounds: Normal breath sounds.   Abdominal:      General: There is no distension.      Palpations: Abdomen is soft.      Tenderness: There is no guarding.      " Comments: Abdominal wound clean, dry and intact.   Musculoskeletal:         General: Normal range of motion.      Cervical back: Normal range of motion and neck supple.      Right lower leg: No edema.      Left lower leg: No edema.   Skin:     General: Skin is warm and dry.      Coloration: Skin is not pale.      Findings: No rash.   Neurological:      General: No focal deficit present.      Mental Status: She is alert and oriented to person, place, and time. Mental status is at baseline.   Psychiatric:         Mood and Affect: Mood normal.         Behavior: Behavior normal.       Significant Labs: All pertinent labs within the past 24 hours have been reviewed.    Significant Imaging: I have reviewed all pertinent imaging results/findings within the past 24 hours.

## 2022-05-31 NOTE — PT/OT/SLP PROGRESS
Physical Therapy Treatment    Patient Name:  Brooks Browning   MRN:  [id number]    Recommendations:     Discharge Recommendations:  outpatient PT, home   Discharge Equipment Recommendations: walker, rolling   Barriers to discharge: None    Assessment:     Brooks Browning is a 56 y.o. female admitted with a medical diagnosis of Small bowel obstruction.  She presents with the following impairments/functional limitations:  weakness, impaired endurance, gait instability .  Reports having been back and forth to the restroom earlier.  Agreed to ambulate in room then requested to use restroom , reported grumbling tummy, returned to bed following.      Rehab Prognosis: Fair; patient would benefit from acute skilled PT services to address these deficits and reach maximum level of function.    Recent Surgery: Procedure(s) (LRB):  LAPAROTOMY, EXPLORATORY (N/A)  LYSIS, ADHESIONS (N/A) 4 Days Post-Op    Plan:     During this hospitalization, patient to be seen 6 x/week to address the identified rehab impairments via gait training, therapeutic activities, therapeutic exercises and progress toward the following goals:    · Plan of Care Expires:  06/30/22    Subjective     Chief Complaint: abdominal grumbling  Patient/Family Comments/goals: to return home soon.  Pain/Comfort:  · Pain Rating 1: other (see comments) (did not rate)  · Location - Orientation 1: generalized  · Location 1: abdomen  · Pain Addressed 1: Reposition, Nurse notified      Objective:     Communicated with nurse Mckeon prior to session.  Patient found supine with bed alarm, PICC line, telemetry upon PT entry to room.     General Precautions: Standard, fall   Orthopedic Precautions:N/A   Braces: N/A  Respiratory Status: Room air     Functional Mobility:  · Bed Mobility:     · Rolling Left:  stand by assistance  · Rolling Right: stand by assistance  · Supine to Sit: contact guard assistance  · Sit to Supine: contact guard assistance  · Transfers:     · Sit to Stand:   contact guard assistance with rolling walker  · Gait: 100' with CGA, pushing IV along.       AM-PAC 6 CLICK MOBILITY          Therapeutic Activities and Exercises:   Transferred EOB , slowly / sat briefly.   Ambulated 100' with CGA, pushed IV along. Transferred on / off commode with SBA.   Returned to bed with CGA.    Patient left supine with all lines intact, call button in reach, bed alarm on and nurse Linda notified..    GOALS:   Multidisciplinary Problems     Physical Therapy Goals        Problem: Physical Therapy    Goal Priority Disciplines Outcome Goal Variances Interventions   Physical Therapy Goal     PT, PT/OT Ongoing, Progressing     Description: Goals to be met by: 22    Patient will increase functional independence with mobility by performin. Supine to sit with Contact Guard Assistance  2. Sit to stand transfer with Contact Guard Assistance  3. Bed to chair transfer with Contact Guard Assistance using Rolling Walker  4. Gait  x 250 feet with Contact Guard Assistance using Rolling Walker.   5. Lower extremity exercise program x20 reps                    Time Tracking:     PT Received On: 22  PT Start Time: 0950     PT Stop Time: 1003  PT Total Time (min): 13 min     Billable Minutes: Gait Training 13min    Treatment Type: Treatment  PT/PTA: PTA     PTA Visit Number: 1     2022

## 2022-05-31 NOTE — CARE UPDATE
05/31/22 0748   Patient Assessment/Suction   Level of Consciousness (AVPU) alert   Respiratory Effort Normal;Unlabored   Expansion/Accessory Muscles/Retractions no use of accessory muscles;no retractions;expansion symmetric   All Lung Fields Breath Sounds clear;equal bilaterally   Rhythm/Pattern, Respiratory unlabored;pattern regular;depth regular   Cough Frequency no cough   PRE-TX-O2   O2 Device (Oxygen Therapy) room air   SpO2 98 %   Pulse Oximetry Type Intermittent   $ Pulse Oximetry - Multiple Charge Pulse Oximetry - Multiple   Pulse 79   Resp 18   Positioning HOB elevated 45 degrees   Incentive Spirometer   $ Incentive Spirometer Charges done with encouragement   Administration (IS) proper technique demonstrated   Number of Repetitions (IS) 10   Level Incentive Spirometer (mL) 1750   Patient Tolerance (IS) good

## 2022-05-31 NOTE — PLAN OF CARE
Problem: Physical Therapy  Goal: Physical Therapy Goal  Description: Goals to be met by: 22    Patient will increase functional independence with mobility by performin. Supine to sit with Contact Guard Assistance  2. Sit to stand transfer with Contact Guard Assistance  3. Bed to chair transfer with Contact Guard Assistance using Rolling Walker  4. Gait  x 250 feet with Contact Guard Assistance using Rolling Walker.   5. Lower extremity exercise program x20 reps   Outcome: Ongoing, Progressing   Ambulate with assistance for safety.

## 2022-05-31 NOTE — ANESTHESIA POSTPROCEDURE EVALUATION
Anesthesia Post Evaluation    Patient: Brooks Browning    Procedure(s) Performed: Procedure(s) (LRB):  LAPAROTOMY, EXPLORATORY (N/A)  LYSIS, ADHESIONS (N/A)    Final Anesthesia Type: general      Patient location during evaluation: PACU  Patient participation: Yes- Able to Participate  Level of consciousness: awake and alert and oriented  Post-procedure vital signs: reviewed and stable  Pain management: adequate  Airway patency: patent  KIMMY mitigation strategies: Multimodal analgesia, Extubation while patient is awake, Verification of full reversal of neuromuscular block and Extubation and recovery carried out in lateral, semiupright, or other nonsupine position  PONV status at discharge: No PONV  Anesthetic complications: no      Cardiovascular status: blood pressure returned to baseline  Respiratory status: unassisted, spontaneous ventilation and room air  Hydration status: euvolemic  Follow-up not needed.          Vitals Value Taken Time   /82 05/31/22 0800   Temp 36.4 °C (97.6 °F) 05/31/22 0800   Pulse 71 05/31/22 0800   Resp 18 05/31/22 0800   SpO2 100 % 05/31/22 0800         Event Time   Out of Recovery 05/27/2022 13:55:00         Pain/Soo Score: Pain Rating Prior to Med Admin: 9 (5/31/2022 12:25 AM)  Soo Score: 9 (5/30/2022  8:00 AM)

## 2022-06-01 VITALS
BODY MASS INDEX: 26.27 KG/M2 | HEART RATE: 97 BPM | OXYGEN SATURATION: 98 % | SYSTOLIC BLOOD PRESSURE: 121 MMHG | HEIGHT: 68 IN | RESPIRATION RATE: 18 BRPM | TEMPERATURE: 99 F | WEIGHT: 173.31 LBS | DIASTOLIC BLOOD PRESSURE: 90 MMHG

## 2022-06-01 PROBLEM — Z87.19 HISTORY OF SMALL BOWEL OBSTRUCTION: Status: ACTIVE | Noted: 2022-05-24

## 2022-06-01 LAB
ALBUMIN SERPL BCP-MCNC: 2.1 G/DL (ref 3.5–5.2)
ALP SERPL-CCNC: 57 U/L (ref 55–135)
ALT SERPL W/O P-5'-P-CCNC: 15 U/L (ref 10–44)
ANION GAP SERPL CALC-SCNC: 11 MMOL/L (ref 8–16)
ANION GAP SERPL CALC-SCNC: 11 MMOL/L (ref 8–16)
AST SERPL-CCNC: 20 U/L (ref 10–40)
BASOPHILS # BLD AUTO: 0.02 K/UL (ref 0–0.2)
BASOPHILS NFR BLD: 0.3 % (ref 0–1.9)
BILIRUB SERPL-MCNC: 0.3 MG/DL (ref 0.1–1)
BUN SERPL-MCNC: 8 MG/DL (ref 6–20)
BUN SERPL-MCNC: 8 MG/DL (ref 6–20)
CALCIUM SERPL-MCNC: 9.1 MG/DL (ref 8.7–10.5)
CALCIUM SERPL-MCNC: 9.1 MG/DL (ref 8.7–10.5)
CHLORIDE SERPL-SCNC: 101 MMOL/L (ref 95–110)
CHLORIDE SERPL-SCNC: 101 MMOL/L (ref 95–110)
CO2 SERPL-SCNC: 25 MMOL/L (ref 23–29)
CO2 SERPL-SCNC: 25 MMOL/L (ref 23–29)
CREAT SERPL-MCNC: 0.6 MG/DL (ref 0.5–1.4)
CREAT SERPL-MCNC: 0.6 MG/DL (ref 0.5–1.4)
DIFFERENTIAL METHOD: ABNORMAL
EOSINOPHIL # BLD AUTO: 0.2 K/UL (ref 0–0.5)
EOSINOPHIL NFR BLD: 2.6 % (ref 0–8)
ERYTHROCYTE [DISTWIDTH] IN BLOOD BY AUTOMATED COUNT: 12.5 % (ref 11.5–14.5)
EST. GFR  (AFRICAN AMERICAN): >60 ML/MIN/1.73 M^2
EST. GFR  (AFRICAN AMERICAN): >60 ML/MIN/1.73 M^2
EST. GFR  (NON AFRICAN AMERICAN): >60 ML/MIN/1.73 M^2
EST. GFR  (NON AFRICAN AMERICAN): >60 ML/MIN/1.73 M^2
GLUCOSE SERPL-MCNC: 79 MG/DL (ref 70–110)
GLUCOSE SERPL-MCNC: 79 MG/DL (ref 70–110)
HCT VFR BLD AUTO: 30.6 % (ref 37–48.5)
HGB BLD-MCNC: 10.1 G/DL (ref 12–16)
IMM GRANULOCYTES # BLD AUTO: 0.07 K/UL (ref 0–0.04)
IMM GRANULOCYTES NFR BLD AUTO: 1.1 % (ref 0–0.5)
LYMPHOCYTES # BLD AUTO: 1.7 K/UL (ref 1–4.8)
LYMPHOCYTES NFR BLD: 27.1 % (ref 18–48)
MAGNESIUM SERPL-MCNC: 1.4 MG/DL (ref 1.6–2.6)
MCH RBC QN AUTO: 31.7 PG (ref 27–31)
MCHC RBC AUTO-ENTMCNC: 33 G/DL (ref 32–36)
MCV RBC AUTO: 96 FL (ref 82–98)
MONOCYTES # BLD AUTO: 0.6 K/UL (ref 0.3–1)
MONOCYTES NFR BLD: 9.3 % (ref 4–15)
NEUTROPHILS # BLD AUTO: 3.7 K/UL (ref 1.8–7.7)
NEUTROPHILS NFR BLD: 59.6 % (ref 38–73)
NRBC BLD-RTO: 0 /100 WBC
PHOSPHATE SERPL-MCNC: 5 MG/DL (ref 2.7–4.5)
PLATELET # BLD AUTO: 231 K/UL (ref 150–450)
PMV BLD AUTO: 9.2 FL (ref 9.2–12.9)
POCT GLUCOSE: 237 MG/DL (ref 70–110)
POCT GLUCOSE: 69 MG/DL (ref 70–110)
POTASSIUM SERPL-SCNC: 3.4 MMOL/L (ref 3.5–5.1)
POTASSIUM SERPL-SCNC: 3.4 MMOL/L (ref 3.5–5.1)
PROT SERPL-MCNC: 5.1 G/DL (ref 6–8.4)
RBC # BLD AUTO: 3.19 M/UL (ref 4–5.4)
SODIUM SERPL-SCNC: 137 MMOL/L (ref 136–145)
SODIUM SERPL-SCNC: 137 MMOL/L (ref 136–145)
WBC # BLD AUTO: 6.27 K/UL (ref 3.9–12.7)

## 2022-06-01 PROCEDURE — 36415 COLL VENOUS BLD VENIPUNCTURE: CPT | Performed by: STUDENT IN AN ORGANIZED HEALTH CARE EDUCATION/TRAINING PROGRAM

## 2022-06-01 PROCEDURE — A4216 STERILE WATER/SALINE, 10 ML: HCPCS | Performed by: STUDENT IN AN ORGANIZED HEALTH CARE EDUCATION/TRAINING PROGRAM

## 2022-06-01 PROCEDURE — 97116 GAIT TRAINING THERAPY: CPT | Mod: CQ

## 2022-06-01 PROCEDURE — 85025 COMPLETE CBC W/AUTO DIFF WBC: CPT | Performed by: STUDENT IN AN ORGANIZED HEALTH CARE EDUCATION/TRAINING PROGRAM

## 2022-06-01 PROCEDURE — 25000003 PHARM REV CODE 250: Performed by: STUDENT IN AN ORGANIZED HEALTH CARE EDUCATION/TRAINING PROGRAM

## 2022-06-01 PROCEDURE — 83735 ASSAY OF MAGNESIUM: CPT | Performed by: STUDENT IN AN ORGANIZED HEALTH CARE EDUCATION/TRAINING PROGRAM

## 2022-06-01 PROCEDURE — 63600175 PHARM REV CODE 636 W HCPCS: Performed by: STUDENT IN AN ORGANIZED HEALTH CARE EDUCATION/TRAINING PROGRAM

## 2022-06-01 PROCEDURE — 94761 N-INVAS EAR/PLS OXIMETRY MLT: CPT

## 2022-06-01 PROCEDURE — 80048 BASIC METABOLIC PNL TOTAL CA: CPT | Mod: XB | Performed by: NURSE PRACTITIONER

## 2022-06-01 PROCEDURE — 80053 COMPREHEN METABOLIC PANEL: CPT | Performed by: STUDENT IN AN ORGANIZED HEALTH CARE EDUCATION/TRAINING PROGRAM

## 2022-06-01 PROCEDURE — 94799 UNLISTED PULMONARY SVC/PX: CPT

## 2022-06-01 PROCEDURE — 84100 ASSAY OF PHOSPHORUS: CPT | Performed by: STUDENT IN AN ORGANIZED HEALTH CARE EDUCATION/TRAINING PROGRAM

## 2022-06-01 RX ORDER — LANOLIN ALCOHOL/MO/W.PET/CERES
400 CREAM (GRAM) TOPICAL DAILY
Qty: 30 TABLET | Refills: 2 | Status: SHIPPED | OUTPATIENT
Start: 2022-06-01

## 2022-06-01 RX ORDER — AMLODIPINE BESYLATE 5 MG/1
10 TABLET ORAL DAILY
Status: DISCONTINUED | OUTPATIENT
Start: 2022-06-01 | End: 2022-06-01 | Stop reason: HOSPADM

## 2022-06-01 RX ORDER — POTASSIUM CHLORIDE 20 MEQ/1
40 TABLET, EXTENDED RELEASE ORAL ONCE
Status: COMPLETED | OUTPATIENT
Start: 2022-06-01 | End: 2022-06-01

## 2022-06-01 RX ORDER — MAGNESIUM SULFATE HEPTAHYDRATE 40 MG/ML
2 INJECTION, SOLUTION INTRAVENOUS
Status: DISPENSED | OUTPATIENT
Start: 2022-06-01 | End: 2022-06-01

## 2022-06-01 RX ORDER — MAGNESIUM SULFATE HEPTAHYDRATE 40 MG/ML
4 INJECTION, SOLUTION INTRAVENOUS ONCE
Status: DISCONTINUED | OUTPATIENT
Start: 2022-06-01 | End: 2022-06-01 | Stop reason: SDUPTHER

## 2022-06-01 RX ORDER — OXYCODONE AND ACETAMINOPHEN 5; 325 MG/1; MG/1
1 TABLET ORAL EVERY 6 HOURS PRN
Qty: 12 TABLET | Refills: 0 | Status: SHIPPED | OUTPATIENT
Start: 2022-06-01

## 2022-06-01 RX ADMIN — ASPIRIN 81 MG CHEWABLE TABLET 81 MG: 81 TABLET CHEWABLE at 10:06

## 2022-06-01 RX ADMIN — AMLODIPINE BESYLATE 10 MG: 5 TABLET ORAL at 10:06

## 2022-06-01 RX ADMIN — SODIUM CHLORIDE, PRESERVATIVE FREE 10 ML: 5 INJECTION INTRAVENOUS at 12:06

## 2022-06-01 RX ADMIN — MAGNESIUM SULFATE 2 G: 2 INJECTION INTRAVENOUS at 10:06

## 2022-06-01 RX ADMIN — ATORVASTATIN CALCIUM 20 MG: 20 TABLET, FILM COATED ORAL at 10:06

## 2022-06-01 RX ADMIN — OXYCODONE AND ACETAMINOPHEN 1 TABLET: 7.5; 325 TABLET ORAL at 07:06

## 2022-06-01 RX ADMIN — Medication 1 TABLET: at 10:06

## 2022-06-01 RX ADMIN — SODIUM CHLORIDE, PRESERVATIVE FREE 10 ML: 5 INJECTION INTRAVENOUS at 10:06

## 2022-06-01 RX ADMIN — INSULIN ASPART 4 UNITS: 100 INJECTION, SOLUTION INTRAVENOUS; SUBCUTANEOUS at 12:06

## 2022-06-01 RX ADMIN — LISINOPRIL 20 MG: 10 TABLET ORAL at 10:06

## 2022-06-01 RX ADMIN — MAGNESIUM SULFATE 4 G: 2 INJECTION INTRAVENOUS at 02:06

## 2022-06-01 RX ADMIN — POTASSIUM CHLORIDE 40 MEQ: 1500 TABLET, EXTENDED RELEASE ORAL at 10:06

## 2022-06-01 NOTE — PLAN OF CARE
Pt home walker approved and delivered to pt at bedside. Delivery ticket signed and completed paperwork returned to DME closet. AVS updated.    06/01/22 0895   Post-Acute Status   Post-Acute Authorization HME   HME Status Set-up Complete/Auth obtained

## 2022-06-01 NOTE — PLAN OF CARE
06/01/22 0722   Patient Assessment/Suction   Level of Consciousness (AVPU) alert   Respiratory Effort Normal;Unlabored   Expansion/Accessory Muscles/Retractions no use of accessory muscles;no retractions   Rhythm/Pattern, Respiratory depth regular;pattern regular;unlabored   Cough Frequency no cough   PRE-TX-O2   O2 Device (Oxygen Therapy) room air   SpO2 96 %   Pulse Oximetry Type Intermittent   $ Pulse Oximetry - Multiple Charge Pulse Oximetry - Multiple   Pulse 76   Resp 16   Incentive Spirometer   $ Incentive Spirometer Charges done with encouragement   Incentive Spirometer Predicted Level (mL) 1000   Administration (IS) instruction provided, follow-up   Number of Repetitions (IS) 10   Level Incentive Spirometer (mL) 1750   Patient Tolerance (IS) good

## 2022-06-01 NOTE — DISCHARGE SUMMARY
[facility] Hospital Medicine  Discharge Summary      Patient Name: Brooks Browning  MRN: [id number]  Patient Class: IP- Inpatient  Admission Date: 2022  Hospital Length of Stay: 8 days  Discharge Date and Time: 2022; 7:38 AM  Attending Physician: Erwin Harmon MD   Discharging Provider: Erwin Harmon MD  Primary Care Provider: Sofía Mucria MD      HPI:   Patient is a 56-year-old  female with a past medical history significant for type 2 diabetes primarily dependent on metformin, hypertension, and multiple joint replacements who presents to the hospital with approximately 1 week of abdominal pain and vomiting.  Patient states that she initially started having abdominal pain approximately 6 days prior to admission with intermittent pain worsening to constant pain with bilious vomiting.  Patient denies any diarrhea or other GI complaints.  Denies any fever or other systemic symptoms.  Patient states that she has never had any episode like this previously.  She initially thought it was a stomach flu , but as it worsened, she presented to the emergency department.  At the outside emergency department, the patient was found to be in diabetic ketoacidosis with elevated beta hydroxybutyrate as well as metabolic acidosis and she was also found to have a secondary small-bowel obstruction.  Patient does have a history of  section in the past.  Patient was transferred from [facility] to [facility] for treatment of her various medical issues.      Procedure(s) (LRB):  LAPAROTOMY, EXPLORATORY (N/A)  LYSIS, ADHESIONS (N/A)      Hospital Course:   Brooks Browning is a 56 year old female with a past medical history of HTN, HLD, DM, and anemia who was admitted to the hospital with small-bowel obstruction (history of ) and diabetic ketoacidosis. She was started on insulin drip per DKA protocol upon admission and was evaluated by General  Surgery. A nasogastric tube was inserted, yet she continued to have significant output through the NG tube, and a repeat contrast study noted no evidence of progression of contrast through the small bowel. The patient subsequently underwent exploratory laparotomy on 05/27 with lysis of adhesions. She was started on TPN. Her DKA has resolved. She was started on a clear liquid diet per Surgery. She was moved out of the ICU 5/29/2022. The NG tube was removed 5/31 and her diet was advanced to diabetic as she began to pass flatus and have bowel movements. She was able to tolerate PO and was discharged 6/1/2022. She will follow up with her PCP and General Surgery. Of note, her course was complicated by hypomagnesemia and hypokalemia likely secondary to HCTZ use. Scheduled K and Mg repletion were prescribed on discharge.       Goals of Care Treatment Preferences:  Code Status: Full Code      Consults:   Consults (From admission, onward)          Status Ordering Provider     [facility]  Once        Provider:  (Not yet assigned)    Completed JESSA ANNE JR     Inpatient consult to Midline team  Once        Provider:  (Not yet assigned)    Completed PANDA DILL     Inpatient consult to General Surgery  Once        Provider:  Jessa Anne Jr., MD    Completed PANDA DILL     Inpatient consult to Registered Dietitian/Nutritionist  Once        Provider:  (Not yet assigned)    Completed PANDA DILL     Inpatient consult to Diabetes educator  Once        Provider:  (Not yet assigned)    Completed PANDA DILL            * History of small bowel obstruction  POD 5. Passing flatus and having BMs. Tolerating PO.  -Diabetic diet  -Surgery following; follow up in clinc  -PRN IV pain and nausea control  -PT/OT; outpatient PT/OT ordered  -Spirometry      Vitamin D deficiency  -Ca and vitamin D repletion      HLD (hyperlipidemia)  -Continue ASA and statin      HTN (hypertension)  BP  "stable.  -Continue to monitor  -Lisinopril  -HCTZ; held 6/1  -Amlodipibe      DM (diabetes mellitus)  -SSI and scheduled insulin while admitted  -Continue metformin on discharge  -Diabetic diet        Hypomagnesemia  -Replete PRN and scheduled  -Telemetry  -Trend Mg           Severe malnutrition  -CMP trend  -Diabetic diet  -Prealbumin and triglyceride levels weekly      Hypokalemia  -Replete PRN; and scheduled  -Trend K  -Telemetry          Final Active Diagnoses:    Diagnosis Date Noted POA    PRINCIPAL PROBLEM:  History of small bowel obstruction [Z87.19] 05/24/2022 Not Applicable    DM (diabetes mellitus) [E11.9] 05/29/2022 Yes    HTN (hypertension) [I10] 05/29/2022 Yes    HLD (hyperlipidemia) [E78.5] 05/29/2022 Yes    Severe malnutrition [E43] 05/28/2022 Yes    Hypomagnesemia [E83.42] 05/28/2022 Yes    Hypokalemia [E87.6] 05/24/2022 Yes    Vitamin D deficiency [E55.9] 12/14/2012 Yes      Problems Resolved During this Admission:    Diagnosis Date Noted Date Resolved POA    Hypophosphatasia [E83.39] 05/28/2022 05/30/2022 Yes    Hypocalcemia [E83.51] 05/28/2022 05/30/2022 Yes    DKA (diabetic ketoacidosis) [E11.10] 05/24/2022 05/29/2022 Yes       Discharged Condition: stable    Disposition: Home or Self Care    Follow Up:   Follow-up Information       Sofía Murcia MD Follow up in 2 week(s).    Specialty: Urgent Care  Contact information:  [address] [place] [address] [phone]               Juve Anne Jr, MD Follow up in 2 week(s).    Specialty: General Surgery  Contact information:  [address] [facility] [facility]  [address]  [facility] [address] [phone]                           Patient Instructions:      WALKER FOR HOME USE     Order Specific Question Answer Comments   Type of Walker: Adult (5'4"-6'6")    With wheels? Yes    Height: 5' 8" (1.727 m)    Weight: 78.6 kg (173 lb 4.5 oz)    Length of need (1-99 months): 99    Does patient have medical equipment at home? other (see comments) " occasional use of shower chair   Please check all that apply: Patient is unable to safely ambulate without equipment.    Please check all that apply: Patient needs help to get in and out of chair.    Please check all that apply: Walker will be used for gait training.    Please check all that apply: Patient's condition impairs ambulation.    Please check all that apply: Altered sensory perception.      Ambulatory referral/consult to Physical/Occupational Therapy   Standing Status: Future   Referral Priority: Routine Referral Type: Physical Medicine   Referral Reason: Specialty Services Required   Number of Visits Requested: 1     Diet diabetic     Notify your health care provider if you experience any of the following:  increased confusion or weakness     Notify your health care provider if you experience any of the following:  persistent dizziness, light-headedness, or visual disturbances     Notify your health care provider if you experience any of the following:  severe persistent headache     Notify your health care provider if you experience any of the following:  redness, tenderness, or signs of infection (pain, swelling, redness, odor or green/yellow discharge around incision site)     Notify your health care provider if you experience any of the following:  severe uncontrolled pain     Notify your health care provider if you experience any of the following:  persistent nausea and vomiting or diarrhea     Notify your health care provider if you experience any of the following:  temperature >100.4     Notify your health care provider if you experience any of the following:  difficulty breathing or increased cough     Activity as tolerated       Significant Diagnostic Studies: Labs: All labs within the past 24 hours have been reviewed    Pending Diagnostic Studies:       None           Medications:  Reconciled Home Medications:      Medication List        START taking these medications      magnesium oxide 400  mg (241.3 mg magnesium) tablet  Commonly known as: MAG-OX  Take 1 tablet (400 mg total) by mouth once daily.            CHANGE how you take these medications      oxyCODONE-acetaminophen 5-325 mg per tablet  Commonly known as: PERCOCET  Take 1 tablet by mouth every 6 (six) hours as needed for Pain.  What changed: how much to take            CONTINUE taking these medications      amLODIPine 10 MG tablet  Commonly known as: NORVASC  Take 10 mg by mouth once daily.     aspirin 81 MG Chew  Take 81 mg by mouth once daily.     atorvastatin 20 MG tablet  Commonly known as: LIPITOR  Take 20 mg by mouth once daily.     calcium carbonate 600 mg calcium (1,500 mg) Tab  Commonly known as: OS-BEN  Take 600 mg by mouth once.     CENTRUM 3,500-18-0.4 unit-mg-mg Chew  Generic drug: multivit-iron-min-folic acid  Take 1 tablet by mouth once daily.     lisinopriL-hydrochlorothiazide 20-12.5 mg per tablet  Commonly known as: PRINZIDE,ZESTORETIC  Take 1 tablet by mouth once daily.     metFORMIN 500 MG tablet  Commonly known as: GLUCOPHAGE  Take 500 mg by mouth 2 (two) times daily with meals.     ondansetron 4 MG Tbdl  Commonly known as: ZOFRAN-ODT  Take 1 tablet (4 mg total) by mouth every 6 (six) hours as needed.     potassium chloride 10 MEQ Cpsr  Commonly known as: MICRO-K  Take 10 mEq by mouth once.            STOP taking these medications      dicyclomine 20 mg tablet  Commonly known as: BENTYL              Indwelling Lines/Drains at time of discharge:   Lines/Drains/Airways       Peripherally Inserted Central Catheter Line  Duration             PICC Triple Lumen 05/27/22 1528 right basilic 4 days              Drain  Duration                  NG/OG Tube 05/24/22 1302 nasogastric 12 Fr. Right nostril 7 days                    Time spent on the discharge of patient: 31 minutes         Erwin Harmon MD  Department of Hospital Medicine  [facility]

## 2022-06-01 NOTE — PLAN OF CARE
I called [facility] at [phone] and was updated to call the facilities line at [phone]. I called to arrange a hospital discharge ride home and was told that their is a 3 hour window to arrange transport. Hospital ride arranged for 5:30 pm pickup for pt and pts daughter as well. Trip confirmation number is [id number]- they will call [phone] the 2nd floor nurses station when they arrive to have the pt send downstairs.PTs nurse Cazares updated.    06/01/22 1431   Discharge Assessment   Assessment Type Discharge Planning Reassessment

## 2022-06-01 NOTE — PT/OT/SLP PROGRESS
Physical Therapy Treatment    Patient Name:  Brooks Browning   MRN:  [id number]    Recommendations:     Discharge Recommendations:  outpatient PT, home   Discharge Equipment Recommendations: walker, rolling   Barriers to discharge: None    Assessment:     Brooks Browning is a 56 y.o. female admitted with a medical diagnosis of History of small bowel obstruction.  She presents with the following impairments/functional limitations:  impaired endurance, impaired skin . Agreed to mobilize. Just finished showering with caregiver at bedside. Ambulated 250' x 2 with SBA.    Rehab Prognosis: Good; patient would benefit from acute skilled PT services to address these deficits and reach maximum level of function.    Recent Surgery: Procedure(s) (LRB):  LAPAROTOMY, EXPLORATORY (N/A)  LYSIS, ADHESIONS (N/A) 5 Days Post-Op    Plan:     During this hospitalization, patient to be seen 6 x/week to address the identified rehab impairments via gait training, therapeutic activities, therapeutic exercises and progress toward the following goals:    · Plan of Care Expires:  06/30/22    Subjective     Chief Complaint: minimal abdominal discomfort  Patient/Family Comments/goals: to return home  Pain/Comfort:  · Pain Rating 1: 0/10      Objective:     Communicated with nurse Cazares prior to session.  Patient found up in chair with telemetry upon PT entry to room.     General Precautions: Standard, fall   Orthopedic Precautions:N/A   Braces: N/A  Respiratory Status: Room air     Functional Mobility:  · Transfers:     · Sit to Stand:  modified independence with no AD  · Gait: 250' x 2 with SBA      AM-PAC 6 CLICK MOBILITY          Therapeutic Activities and Exercises:   Ambulated with SBA in hallway , returned to sit in chair.    Patient left up in chair with all lines intact, call button in reach, nurse Cazares notified and caregiver present..    GOALS:   Multidisciplinary Problems     Physical Therapy Goals        Problem: Physical Therapy    Goal  Priority Disciplines Outcome Goal Variances Interventions   Physical Therapy Goal     PT, PT/OT Ongoing, Progressing     Description: Goals to be met by: 22    Patient will increase functional independence with mobility by performin. Supine to sit with Contact Guard Assistance  2. Sit to stand transfer with Contact Guard Assistance  3. Bed to chair transfer with Contact Guard Assistance using Rolling Walker  4. Gait  x 250 feet with Contact Guard Assistance using Rolling Walker.   5. Lower extremity exercise program x20 reps                    Time Tracking:     PT Received On: 22  PT Start Time: 945     PT Stop Time: 955  PT Total Time (min): 10 min     Billable Minutes: Gait Training 10min    Treatment Type: Treatment  PT/PTA: PTA     PTA Visit Number: 2     2022

## 2022-06-01 NOTE — PLAN OF CARE
Problem: Adult Inpatient Plan of Care  Goal: Plan of Care Review  Outcome: Ongoing, Progressing  Goal: Patient-Specific Goal (Individualized)  Outcome: Ongoing, Progressing  Goal: Optimal Comfort and Wellbeing  Outcome: Ongoing, Progressing     Patient is AAOX4, concerned about her ride home. Arrangements made through her insurance. Daughter sleeping at bedside. Discharge instructions given. PIV removed with catheter intact. Telemetry removed and returned to monitor room.

## 2022-06-01 NOTE — PROGRESS NOTES
General Surgery Progress Note    Admit Date: 5/24/2022  S/P: Procedure(s) (LRB):  LAPAROTOMY, EXPLORATORY (N/A)  LYSIS, ADHESIONS (N/A)    Post-operative Day: 5 Days Post-Op    Hospital Day: 9    SUBJECTIVE:   NG tube removed yesterday.  Patient tolerated regular diet.  Passing flatus and having bowel function    OBJECTIVE:     Vital Signs (Most Recent)  Temp:  [96.7 °F (35.9 °C)-98.6 °F (37 °C)] 98.5 °F (36.9 °C)  Pulse:  [71-83] 76  Resp:  [16-18] 18  SpO2:  [96 %-100 %] 97 %  BP: (112-133)/(66-91) 133/81    I&Os:  I/O last 3 completed shifts:  In: 620 [P.O.:620]  Out: -     Physical Exam:  Gen: NAD, AAOx3  HEENT: Anicteric sclera  Pulm: unlabored, symmetrical   Abd: soft, appropriate TTP, incision intact w/o SOI  EXT:  Trace bilateral lower extremity pitting edema    Laboratory:  CBC:   Recent Labs   Lab 06/01/22  0109   WBC 6.27   RBC 3.19*   HGB 10.1*   HCT 30.6*      MCV 96   MCH 31.7*   MCHC 33.0     BMP:   Recent Labs   Lab 06/01/22  0109   GLU 79  79     137   K 3.4*  3.4*     101   CO2 25  25   BUN 8  8   CREATININE 0.6  0.6   CALCIUM 9.1  9.1     Labs within the past 24 hours have been reviewed.    ASSESSMENT/PLAN:     Patient Active Problem List    Diagnosis Date Noted    DM (diabetes mellitus) 05/29/2022    HTN (hypertension) 05/29/2022    HLD (hyperlipidemia) 05/29/2022    Severe malnutrition 05/28/2022    Hypomagnesemia 05/28/2022    History of small bowel obstruction 05/24/2022    Hypokalemia 05/24/2022    Polyp of colon 12/15/2020    Pain in joint of left knee 02/26/2018    Degenerative arthritis of left knee 02/05/2018    Family history of colon cancer 10/05/2017    Osteoarthrosis 12/14/2012    Pain in both feet 12/14/2012    Tobacco dependence syndrome 12/14/2012    Vitamin D deficiency 12/14/2012         56 y.o. female s/p ex-lap with MARLO for sbo  - bowel function has returned and tolerating diet  - incision healing well  - okay for discharge from  surgical standpoint

## 2022-06-01 NOTE — PLAN OF CARE
I updated [facility] that pt has a home walker ordered for review. Message sent to Dr. Willoughby's office to call pt back with a follow up appointment date and time.  CM following.    06/01/22 0833   Post-Acute Status   Post-Acute Authorization E   [facility] Status Referrals Sent

## 2022-06-01 NOTE — CARE UPDATE
05/31/22 1935   Patient Assessment/Suction   Level of Consciousness (AVPU) alert   Respiratory Effort Normal;Unlabored   PRE-TX-O2   O2 Device (Oxygen Therapy) room air   SpO2 98 %   Pulse Oximetry Type Intermittent   Incentive Spirometer   $ Incentive Spirometer Charges done with encouragement   Administration (IS) proper technique demonstrated   Number of Repetitions (IS) 10   Level Incentive Spirometer (mL) 1750   Patient Tolerance (IS) good

## 2022-06-01 NOTE — PLAN OF CARE
Problem: Adult Inpatient Plan of Care  Goal: Plan of Care Review  Outcome: Ongoing, Progressing  Goal: Optimal Comfort and Wellbeing  Outcome: Ongoing, Progressing     Problem: Diabetes Comorbidity  Goal: Blood Glucose Level Within Targeted Range  Outcome: Ongoing, Progressing

## 2022-06-01 NOTE — NURSING
Patient laying in the bed comfortably. Alteplase applied to patients PICC line due to having difficultly flushing. Per instructions leave in for 120 minutes then flush if unsuccessful repeat process per CN. Patient has secondary peripheral IV running electrolytes as ordered tolerating well. PICC team notified of difficulties having with line, xray ordered showed that PICC is in proper position. Night nurse made aware, will continue to monitor and notify PICC team in the morning if issues continue.

## 2022-06-01 NOTE — PLAN OF CARE
The pt is cleared for discharge home from case management and has follow up appointments added to her AVS.    06/01/22 0855   Final Note   Assessment Type Final Discharge Note   Anticipated Discharge Disposition Home   Hospital Resources/Appts/Education Provided Appointments scheduled and added to AVS

## 2022-06-01 NOTE — ASSESSMENT & PLAN NOTE
POD 5. Passing flatus and having BMs. Tolerating PO.  -Diabetic diet  -Surgery following; follow up in clinc  -PRN IV pain and nausea control  -PT/OT; outpatient PT/OT ordered  -Spirometry

## 2022-06-01 NOTE — PLAN OF CARE
Pt is cleared for discharge home with Medicaid transport [facility] [employer] from case management.    06/01/22 1433   Final Note   Assessment Type Final Discharge Note   Anticipated Discharge Disposition Home

## 2022-06-02 ENCOUNTER — TELEPHONE (OUTPATIENT)
Dept: MEDSURG UNIT | Facility: HOSPITAL | Age: 57
End: 2022-06-02
Payer: MEDICAID

## 2023-06-22 PROBLEM — M43.16 SPONDYLOLISTHESIS, LUMBAR REGION: Status: ACTIVE | Noted: 2023-06-22

## (undated) DEVICE — SYR ONLY LUER LOCK 20CC

## (undated) DEVICE — ELECTRODE BLD EXT 6.50 ST DISP

## (undated) DEVICE — SEE MEDLINE ITEM 146231

## (undated) DEVICE — SUT 3-0 12-18IN SILK

## (undated) DEVICE — NDL SAFETY 21G X 1 1/2 ECLPSE

## (undated) DEVICE — SUT VICRYL 2-0 36 CT-1

## (undated) DEVICE — SLEEVE SCD EXPRESS KNEE MEDIUM

## (undated) DEVICE — PAD ABD 8X10 STERILE

## (undated) DEVICE — DRESSING ABSRBNT ISLAND 3.6X8

## (undated) DEVICE — DRAPE INCISE IOBAN 2 23X17IN

## (undated) DEVICE — GLOVE BIOGEL SKINSENSE PI 8.0

## (undated) DEVICE — GLOVE SURG ULTRA TOUCH 7

## (undated) DEVICE — DRESSING MEPILEX BORDR AG 4X12

## (undated) DEVICE — SEE MEDLINE ITEM 152523

## (undated) DEVICE — BLADE SAG DUAL 18MMX1.27MMX90M

## (undated) DEVICE — MIXER BONE CEMENT

## (undated) DEVICE — IMMOBILIZER KNEE LG 19 L

## (undated) DEVICE — KIT IRR SUCTION HND PIECE

## (undated) DEVICE — SEE MEDLINE ITEM 153151

## (undated) DEVICE — GAUZE SPONGE 4X4 12PLY

## (undated) DEVICE — HOOD T-5 TEAR AWAY STERILE

## (undated) DEVICE — DRAPE PLASTIC U 60X72

## (undated) DEVICE — Device

## (undated) DEVICE — SUT MONOCRYL 4-0 PS-2

## (undated) DEVICE — SEE MEDLINE ITEM 146292

## (undated) DEVICE — APPLICATOR CHLORAPREP ORN 26ML

## (undated) DEVICE — SUT SILK 0 STRANDS 30IN BLK

## (undated) DEVICE — SYR 30CC LUER LOCK

## (undated) DEVICE — ALCOHOL 70% ISOP W/GREEN 16OZ

## (undated) DEVICE — DRAPE ABDOMINAL TIBURON 14X11

## (undated) DEVICE — ADHESIVE DERMABOND ADVANCED

## (undated) DEVICE — GLOVE BIOGEL PIMICRO INDIC 6.5

## (undated) DEVICE — SOL IRR NACL .9% 3000ML

## (undated) DEVICE — STAPLER SKIN ROTATING HEAD

## (undated) DEVICE — SEE MEDLINE ITEM 157117

## (undated) DEVICE — BLADE SAW SAG 25.40MM X 1.27MM

## (undated) DEVICE — NDL SAFETY 22G X 1.5 ECLIPSE

## (undated) DEVICE — DRAPE CORETEMP FLD WRM 56X62IN

## (undated) DEVICE — SUT 2-0 12-18IN SILK

## (undated) DEVICE — TOURNIQUET SB QC SP 34X4IN

## (undated) DEVICE — SUT CTD VICRYL 3-0 CR/SH

## (undated) DEVICE — DRESSING XEROFORM 1X8IN

## (undated) DEVICE — SUT VICRYL PLUS ANTIBACT

## (undated) DEVICE — ELECTRODE REM PLYHSV RETURN 9

## (undated) DEVICE — SUT PDS II 0 CT-1 VIL MONO

## (undated) DEVICE — LINER SUCTION 3000CC

## (undated) DEVICE — PAD CAST SPECIALIST STRL 6

## (undated) DEVICE — GLOVE SURGEONS ULTRA TOUCH 6.5

## (undated) DEVICE — STRAP OR TABLE 5IN X 72IN

## (undated) DEVICE — GLOVE BIOGEL PI MICRO INDIC 7

## (undated) DEVICE — DRESSING GAUZE 6PLY 4X4

## (undated) DEVICE — SEE MEDLINE ITEM 152622

## (undated) DEVICE — PADDING CAST SPECIALIST 6X4YD

## (undated) DEVICE — SEE MEDLINE ITEM 152530

## (undated) DEVICE — TOWEL OR DISP STRL BLUE 4/PK

## (undated) DEVICE — PACK CUSTOM UNIV BASIN SLI

## (undated) DEVICE — GLOVE SURG ULTRA TOUCH 7.5

## (undated) DEVICE — ELECTRODE PATIENT RETURN DISP

## (undated) DEVICE — SEE MEDLINE ITEM 157116

## (undated) DEVICE — TRAY FOLEY 16FR INFECTION CONT

## (undated) DEVICE — SUT ETHIBOND XTRA 1 CTX

## (undated) DEVICE — UNDERGLOVES BIOGEL PI SZ 7 LF

## (undated) DEVICE — COVER BACK TABLE 72X21

## (undated) DEVICE — DRESSING XEROFORM FOIL PK 1X8

## (undated) DEVICE — SOL 9P NACL IRR PIC IL

## (undated) DEVICE — DRAPE STERI U-SHAPED 47X51IN

## (undated) DEVICE — SEE MEDLINE ITEM 146271

## (undated) DEVICE — MANIFOLD 4 PORT

## (undated) DEVICE — SEE MEDLINE ITEM 146298